# Patient Record
Sex: MALE | Race: ASIAN | NOT HISPANIC OR LATINO
[De-identification: names, ages, dates, MRNs, and addresses within clinical notes are randomized per-mention and may not be internally consistent; named-entity substitution may affect disease eponyms.]

---

## 2021-04-13 VITALS
RESPIRATION RATE: 17 BRPM | DIASTOLIC BLOOD PRESSURE: 81 MMHG | WEIGHT: 214.07 LBS | SYSTOLIC BLOOD PRESSURE: 176 MMHG | OXYGEN SATURATION: 92 % | HEIGHT: 74 IN | TEMPERATURE: 98 F | HEART RATE: 76 BPM

## 2021-04-13 RX ORDER — CHLORHEXIDINE GLUCONATE 213 G/1000ML
1 SOLUTION TOPICAL ONCE
Refills: 0 | Status: DISCONTINUED | OUTPATIENT
Start: 2021-04-22 | End: 2021-04-22

## 2021-04-13 NOTE — H&P ADULT - BACK
No deformity or limitation of movement
Implemented All Universal Safety Interventions:  [place] to call system. Call bell, personal items and telephone within reach. Instruct patient to call for assistance. Room bathroom lighting operational. Non-slip footwear when patient is off stretcher. Physically safe environment: no spills, clutter or unnecessary equipment. Stretcher in lowest position, wheels locked, appropriate side rails in place.

## 2021-04-13 NOTE — H&P ADULT - ASSESSMENT
61 year old M, former smoker with FHx of CAD (Siblings and parents), PMHx HLD, DM II, CKD Stage II (GFR 53, Cr 1.43 on 4/2/2021), Afib (on Eliquis last dose 4/21/21 AM), CAD s/p 1V CABG and Mitral Valve Repair @ [facility] 2003, Graves s/p Radioactive Iodine now with Hypothyroidism, ADALI stenosis s/p CEA, Erectile Dysfunction, PAD s/p PTA 3/2021 @ [facility] complicated by LLE cellulitis requiring admission @ [facility] for 4 days for IV Abx, MAEGAN on CPAP, ? COPD (no intubations, no home O2), Anxiety who in light of risk factors, CCS class III, and abnormal stress echo patient now presents for cardiac catheterization with possible intervention.   ASA II, Mallampati II  Hgb/HCT: 12.2/40.1. Pt denies bleeding, melena, BRBPR, hematuria. Last dose of Eliquis 5mg 4/21/21 AM, reports compliance with Plavix 75mg PO QD, last dose 4/21/21. Pt ordered for Aspirin 325mg PO x 1 and Plavix 75mg PO x 1.   NS @ 75 cc/hr ordered. Pt is euvolemic on exam. EF 50% by ECHO. Cr. 1.27 ( 1/43 on 4/2/21). Pt with tight legs B/L, non-pitting edema.   Pt is a candidate for moderate sedation: yes  Risks & benefits of procedure and alternative therapy have been explained to the patient including but not limited to: allergic reaction, bleeding w/possible need for blood transfusion, infection, renal and vascular compromise, limb damage, arrhythmia, stroke, vessel dissection/perforation, Myocardial infarction, emergent CABG. Informed consent obtained and in chart.   61 year old M, former smoker with FHx of CAD (Siblings and parents), PMHx HLD, DM II, CKD Stage II (GFR 53, Cr 1.43 on 4/2/2021), Afib (on Eliquis last dose 4/21/21 AM), CAD s/p 1V CABG and Mitral Valve Repair @ [facility] 2003, Graves s/p Radioactive Iodine now with Hypothyroidism, ADALI stenosis s/p CEA, Erectile Dysfunction, PAD s/p PTA 3/2021 @ [facility] complicated by LLE cellulitis requiring admission @ [facility] for 4 days for IV Abx, MAEGAN on CPAP, ? COPD (no intubations, no home O2), Anxiety who in light of risk factors, CCS class III, and abnormal stress echo patient now presents for cardiac catheterization with possible intervention.   ASA II, Mallampati II  Hgb/HCT: 12.2/40.1. Pt denies bleeding, melena, BRBPR, hematuria. Last dose of Eliquis 5mg 4/21/21 AM, reports compliance with Plavix 75mg PO QD, last dose 4/21/21. Pt ordered for Aspirin 325mg PO x 1 and Plavix 75mg PO x 1.   NS @ 100 cc/hr ordered. Pt is euvolemic on exam. EF 50% by ECHO. Cr. 1.27 ( 1/43 on 4/2/21). Pt with tight legs B/L, non-pitting edema.   Pt is a candidate for moderate sedation: yes  Risks & benefits of procedure and alternative therapy have been explained to the patient including but not limited to: allergic reaction, bleeding w/possible need for blood transfusion, infection, renal and vascular compromise, limb damage, arrhythmia, stroke, vessel dissection/perforation, Myocardial infarction, emergent CABG. Informed consent obtained and in chart.   61 year old M, former smoker with FHx of CAD (Siblings and parents), PMHx HLD, DM II, CKD Stage II (GFR 53, Cr 1.43 on 4/2/2021), Afib (on Eliquis last dose 4/21/21 AM), CAD s/p 1V CABG and Mitral Valve Repair @ [facility] 2003, Graves s/p Radioactive Iodine now with Hypothyroidism, ADALI stenosis s/p CEA, Erectile Dysfunction, PAD s/p PTA 3/2021 @ [facility] complicated by LLE cellulitis requiring admission @ [facility] for 4 days for IV Abx, MAEGAN on CPAP, ? COPD (no intubations, no home O2), Anxiety who in light of risk factors, CCS class III, and abnormal stress echo patient now presents for cardiac catheterization with possible intervention.   ASA II, Mallampati II  Hgb/HCT: 12.2/40.1. Pt denies bleeding, melena, BRBPR, hematuria. Last dose of Eliquis 5mg 4/21/21 AM, reports compliance with Plavix 75mg PO QD, last dose 4/21/21, IC Fellow Montrell made aware. Pt ordered for Aspirin 325mg PO x 1 and Plavix 75mg PO x 1.   NS @ 100 cc/hr ordered. Pt is euvolemic on exam. EF 50% by ECHO. Cr. 1.27 ( 1/43 on 4/2/21). Pt with tight legs B/L, non-pitting edema.   Pt is a candidate for moderate sedation: yes  Risks & benefits of procedure and alternative therapy have been explained to the patient including but not limited to: allergic reaction, bleeding w/possible need for blood transfusion, infection, renal and vascular compromise, limb damage, arrhythmia, stroke, vessel dissection/perforation, Myocardial infarction, emergent CABG. Informed consent obtained and in chart.

## 2021-04-13 NOTE — H&P ADULT - NSICDXPASTSURGICALHX_GEN_ALL_CORE_FT
PAST SURGICAL HISTORY:  H/O salivary gland disease S/p excision    S/P CABG x 1     S/P carotid endarterectomy

## 2021-04-13 NOTE — H&P ADULT - NSICDXPASTMEDICALHX_GEN_ALL_CORE_FT
PAST MEDICAL HISTORY:  Atrial fibrillation     CAD (coronary artery disease)     H/O erectile dysfunction     H/O mitral valve stenosis     Hyperlipidemia     Hypertension     Hypothyroidism     MAEGAN on CPAP     PAD (peripheral artery disease)     Stage 2 chronic kidney disease

## 2021-04-13 NOTE — H&P ADULT - HISTORY OF PRESENT ILLNESS
[facility]  Cardiologist: Dr. Brandi Negrete  Pharmacy:  Escort:  61 year old M former smoker with PMHx Hyperlipidemia, DM Type II, CKD Stage III (GFR 53, Cr 1.43 on 4/2/2021), Afib (on Eliquis last dose ?) , CAD s/p 1V CABG and Mitral Valve Repair,  Graves s/p Radioactive Iodine now with Hypothyroidism, ADALI stenosis s/p CEA, Erectile Dysfunction, PAD s/p PTA, MAEGAN, COPD   [facility]  Cardiologist: Dr. Brandi Negrete  Pharmacy:  Escort:  61 year old M former smoker with PMHx Hyperlipidemia, DM Type II, CKD Stage III (GFR 53, Cr 1.43 on 4/2/2021), Afib (on Eliquis last dose ?) , CAD s/p 1V CABG and Mitral Valve Repair,  Graves s/p Radioactive Iodine now with Hypothyroidism, ADALI stenosis s/p CEA, Erectile Dysfunction, PAD s/p PTA, MAEGAN, COPD who presented to cardiologist Dr. Negrete   Shortness of breath with 1/2 block and   feels dizzy when walking  event monitor 12/9/21 3 runs of SVT over 7 days  Patient denies active chest pain, palpitations, shortness of breath, diaphoresis, fatigue, dizziness, syncope, lower extremity edema, orthopnea, positional nocturnal dyspnea, recent fever, chills, night sweats, cough, nausea, vomiting, and diarrhea.   In light of patients risk factors, CCS class **, and abnormal patient now presents for cardiac catheterization with possible intervention.    Covid:  Cardiologist: Dr. Brandi Negrete  Pharmacy:  Escort:  [facility]  Confirm meds  61 year old M former smoker with PMHx HLD, DM II, CKD Stage III (GFR 53, Cr 1.43 on 4/2/2021), Afib (on Eliquis last dose____) , CAD s/p 1V CABG and Mitral Valve Repair,  Graves s/p Radioactive Iodine now with Hypothyroidism, ADALI stenosis s/p CEA, Erectile Dysfunction, PAD s/p PTA, MAEGAN, COPD who presented to cardiologist Dr. Negrete c/o SOB and dizziness upon <1 block ambulation. Pt currently denies fevers, chills, chest pain, palpitations, orthopnea, PND, syncope, n/v, LE edema. Holter monitor 12/9/21 3 runs of SVT over 7 days.  In light of patients risk factors, CCS class **, and abnormal ____ patient now presents for cardiac catheterization with possible intervention.    Covid: NEG in HIE 4/19   Cardiologist: Dr. Brandi Negrete  Pharmacy:   Escort:  [email or web address]  Confirm meds - Last dose Eliquis?  61 year old M former smoker with PMHx HLD, DM II, CKD Stage III (GFR 53, Cr 1.43 on 4/2/2021), Afib (on Eliquis last dose____) , CAD s/p 1V CABG and Mitral Valve Repair,  Graves s/p Radioactive Iodine now with Hypothyroidism, ADALI stenosis s/p CEA, Erectile Dysfunction, PAD s/p PTA, MAEGAN, COPD who presented to cardiologist Dr. Negrete c/o SOB and dizziness upon <1 block ambulation. Pt currently denies fevers, chills, chest pain, palpitations, orthopnea, PND, syncope, n/v, LE edema. Stress echo 7/2020 positive for ischemia of the mid septum and mid/basal inferior wall. Holter monitor 12/9/21 3 runs of SVT over 7 days.  In light of patients risk factors, CCS class **, and abnormal stress echo patient now presents for cardiac catheterization with possible intervention.    Covid: NEG in HIE 4/19   Cardiologist: Dr. Brandi Negrete  Pharmacy: [facility] , [facility]  Escort: None  61 year old M, former smoker with FHx of CAD (Siblings and parents), PMHx HLD, DM II, CKD Stage II (GFR 53, Cr 1.43 on 4/2/2021), Afib (on Eliquis last dose 4/21/21 AM), CAD s/p 1V CABG and Mitral Valve Repair @ [facility] 2003, Graves s/p Radioactive Iodine now with Hypothyroidism, ADALI stenosis s/p CEA, Erectile Dysfunction, PAD s/p PTA 3/2021 @ [facility] complicated by LLE cellulitis requiring admission @ [facility] for 4 days for IV Abx, MAEGAN on CPAP, ? COPD (no intubations, no home O2), Anxiety who presented to cardiologist Dr. Negrete c/o SOB and dizziness upon <1 block ambulation for the past 4 months. Pt currently denies fevers, chills, chest pain, palpitations, orthopnea, PND, syncope, n/v. Stress echo 7/2020 positive for ischemia of the mid septum and mid/basal inferior wall, trivial WI, trace TR, mild MR. Holter monitor 12/9/21 3 runs of SVT over 7 days.  In light of patients risk factors, CCS class III, and abnormal stress echo patient now presents for cardiac catheterization with possible intervention.

## 2021-04-14 PROBLEM — Z00.00 ENCOUNTER FOR PREVENTIVE HEALTH EXAMINATION: Status: ACTIVE | Noted: 2021-04-14

## 2021-04-19 ENCOUNTER — LABORATORY RESULT (OUTPATIENT)
Age: 61
End: 2021-04-19

## 2021-04-20 RX ORDER — CHLORHEXIDINE GLUCONATE 213 G/1000ML
1 SOLUTION TOPICAL ONCE
Refills: 0 | Status: DISCONTINUED | OUTPATIENT
Start: 2021-04-22 | End: 2021-04-22

## 2021-04-22 ENCOUNTER — TRANSCRIPTION ENCOUNTER (OUTPATIENT)
Age: 61
End: 2021-04-22

## 2021-04-22 ENCOUNTER — INPATIENT (INPATIENT)
Facility: HOSPITAL | Age: 61
LOS: 0 days | Discharge: HOME | DRG: 247 | End: 2021-04-23
Attending: INTERNAL MEDICINE | Admitting: INTERNAL MEDICINE
Payer: COMMERCIAL

## 2021-04-22 DIAGNOSIS — Z95.1 PRESENCE OF AORTOCORONARY BYPASS GRAFT: Chronic | ICD-10-CM

## 2021-04-22 DIAGNOSIS — Z98.890 OTHER SPECIFIED POSTPROCEDURAL STATES: Chronic | ICD-10-CM

## 2021-04-22 DIAGNOSIS — Z87.19 PERSONAL HISTORY OF OTHER DISEASES OF THE DIGESTIVE SYSTEM: Chronic | ICD-10-CM

## 2021-04-22 LAB
A1C WITH ESTIMATED AVERAGE GLUCOSE RESULT: 8.2 % — HIGH (ref 4–5.6)
ALBUMIN SERPL ELPH-MCNC: 4.5 G/DL — SIGNIFICANT CHANGE UP (ref 3.3–5)
ALP SERPL-CCNC: 104 U/L — SIGNIFICANT CHANGE UP (ref 40–120)
ALT FLD-CCNC: 13 U/L — SIGNIFICANT CHANGE UP (ref 10–45)
ANION GAP SERPL CALC-SCNC: 9 MMOL/L — SIGNIFICANT CHANGE UP (ref 5–17)
APTT BLD: 36.3 SEC — HIGH (ref 27.5–35.5)
AST SERPL-CCNC: 17 U/L — SIGNIFICANT CHANGE UP (ref 10–40)
BASOPHILS # BLD AUTO: 0.03 K/UL — SIGNIFICANT CHANGE UP (ref 0–0.2)
BASOPHILS NFR BLD AUTO: 0.5 % — SIGNIFICANT CHANGE UP (ref 0–2)
BILIRUB DIRECT SERPL-MCNC: 0.2 MG/DL — SIGNIFICANT CHANGE UP (ref 0–0.2)
BILIRUB INDIRECT FLD-MCNC: 0.4 MG/DL — SIGNIFICANT CHANGE UP (ref 0.2–1)
BILIRUB SERPL-MCNC: 0.6 MG/DL — SIGNIFICANT CHANGE UP (ref 0.2–1.2)
BUN SERPL-MCNC: 27 MG/DL — HIGH (ref 7–23)
CALCIUM SERPL-MCNC: 9.5 MG/DL — SIGNIFICANT CHANGE UP (ref 8.4–10.5)
CHLORIDE SERPL-SCNC: 101 MMOL/L — SIGNIFICANT CHANGE UP (ref 96–108)
CHOLEST SERPL-MCNC: 127 MG/DL — SIGNIFICANT CHANGE UP
CK MB CFR SERPL CALC: 2.8 NG/ML — SIGNIFICANT CHANGE UP (ref 0–6.7)
CK SERPL-CCNC: 73 U/L — SIGNIFICANT CHANGE UP (ref 30–200)
CO2 SERPL-SCNC: 31 MMOL/L — SIGNIFICANT CHANGE UP (ref 22–31)
CREAT SERPL-MCNC: 1.27 MG/DL — SIGNIFICANT CHANGE UP (ref 0.5–1.3)
EOSINOPHIL # BLD AUTO: 0.09 K/UL — SIGNIFICANT CHANGE UP (ref 0–0.5)
EOSINOPHIL NFR BLD AUTO: 1.4 % — SIGNIFICANT CHANGE UP (ref 0–6)
ESTIMATED AVERAGE GLUCOSE: 189 MG/DL — HIGH (ref 68–114)
GLUCOSE BLDC GLUCOMTR-MCNC: 112 MG/DL — HIGH (ref 70–99)
GLUCOSE SERPL-MCNC: 157 MG/DL — HIGH (ref 70–99)
HCT VFR BLD CALC: 40.1 % — SIGNIFICANT CHANGE UP (ref 39–50)
HDLC SERPL-MCNC: 42 MG/DL — SIGNIFICANT CHANGE UP
HGB BLD-MCNC: 12.2 G/DL — LOW (ref 13–17)
IMM GRANULOCYTES NFR BLD AUTO: 1.1 % — SIGNIFICANT CHANGE UP (ref 0–1.5)
INR BLD: 1.43 — HIGH (ref 0.88–1.16)
LIPID PNL WITH DIRECT LDL SERPL: 75 MG/DL — SIGNIFICANT CHANGE UP
LYMPHOCYTES # BLD AUTO: 0.59 K/UL — LOW (ref 1–3.3)
LYMPHOCYTES # BLD AUTO: 9 % — LOW (ref 13–44)
MCHC RBC-ENTMCNC: 27.2 PG — SIGNIFICANT CHANGE UP (ref 27–34)
MCHC RBC-ENTMCNC: 30.4 GM/DL — LOW (ref 32–36)
MCV RBC AUTO: 89.5 FL — SIGNIFICANT CHANGE UP (ref 80–100)
MONOCYTES # BLD AUTO: 0.77 K/UL — SIGNIFICANT CHANGE UP (ref 0–0.9)
MONOCYTES NFR BLD AUTO: 11.7 % — SIGNIFICANT CHANGE UP (ref 2–14)
NEUTROPHILS # BLD AUTO: 5.02 K/UL — SIGNIFICANT CHANGE UP (ref 1.8–7.4)
NEUTROPHILS NFR BLD AUTO: 76.3 % — SIGNIFICANT CHANGE UP (ref 43–77)
NON HDL CHOLESTEROL: 85 MG/DL — SIGNIFICANT CHANGE UP
NRBC # BLD: 0 /100 WBCS — SIGNIFICANT CHANGE UP (ref 0–0)
PLATELET # BLD AUTO: 126 K/UL — LOW (ref 150–400)
POTASSIUM SERPL-MCNC: 4.5 MMOL/L — SIGNIFICANT CHANGE UP (ref 3.5–5.3)
POTASSIUM SERPL-SCNC: 4.5 MMOL/L — SIGNIFICANT CHANGE UP (ref 3.5–5.3)
PROT SERPL-MCNC: 8.6 G/DL — HIGH (ref 6–8.3)
PROTHROM AB SERPL-ACNC: 16.9 SEC — HIGH (ref 10.6–13.6)
RBC # BLD: 4.48 M/UL — SIGNIFICANT CHANGE UP (ref 4.2–5.8)
RBC # FLD: 16 % — HIGH (ref 10.3–14.5)
SODIUM SERPL-SCNC: 141 MMOL/L — SIGNIFICANT CHANGE UP (ref 135–145)
TRIGL SERPL-MCNC: 48 MG/DL — SIGNIFICANT CHANGE UP
WBC # BLD: 6.57 K/UL — SIGNIFICANT CHANGE UP (ref 3.8–10.5)
WBC # FLD AUTO: 6.57 K/UL — SIGNIFICANT CHANGE UP (ref 3.8–10.5)

## 2021-04-22 PROCEDURE — 92928 PRQ TCAT PLMT NTRAC ST 1 LES: CPT | Mod: RI

## 2021-04-22 PROCEDURE — 93010 ELECTROCARDIOGRAM REPORT: CPT | Mod: 76

## 2021-04-22 PROCEDURE — 99152 MOD SED SAME PHYS/QHP 5/>YRS: CPT

## 2021-04-22 PROCEDURE — 93455 CORONARY ART/GRFT ANGIO S&I: CPT | Mod: 26,59

## 2021-04-22 RX ORDER — ATORVASTATIN CALCIUM 80 MG/1
1 TABLET, FILM COATED ORAL
Qty: 0 | Refills: 0 | DISCHARGE

## 2021-04-22 RX ORDER — LEVOTHYROXINE SODIUM 125 MCG
150 TABLET ORAL DAILY
Refills: 0 | Status: DISCONTINUED | OUTPATIENT
Start: 2021-04-22 | End: 2021-04-23

## 2021-04-22 RX ORDER — DEXTROSE 50 % IN WATER 50 %
12.5 SYRINGE (ML) INTRAVENOUS ONCE
Refills: 0 | Status: DISCONTINUED | OUTPATIENT
Start: 2021-04-22 | End: 2021-04-23

## 2021-04-22 RX ORDER — FUROSEMIDE 40 MG
1 TABLET ORAL
Qty: 0 | Refills: 0 | DISCHARGE

## 2021-04-22 RX ORDER — GLUCAGON INJECTION, SOLUTION 0.5 MG/.1ML
1 INJECTION, SOLUTION SUBCUTANEOUS ONCE
Refills: 0 | Status: DISCONTINUED | OUTPATIENT
Start: 2021-04-22 | End: 2021-04-23

## 2021-04-22 RX ORDER — SERTRALINE 25 MG/1
50 TABLET, FILM COATED ORAL DAILY
Refills: 0 | Status: DISCONTINUED | OUTPATIENT
Start: 2021-04-22 | End: 2021-04-23

## 2021-04-22 RX ORDER — INSULIN LISPRO 100/ML
VIAL (ML) SUBCUTANEOUS
Refills: 0 | Status: DISCONTINUED | OUTPATIENT
Start: 2021-04-22 | End: 2021-04-23

## 2021-04-22 RX ORDER — SPIRONOLACTONE 25 MG/1
12.5 TABLET, FILM COATED ORAL DAILY
Refills: 0 | Status: DISCONTINUED | OUTPATIENT
Start: 2021-04-23 | End: 2021-04-23

## 2021-04-22 RX ORDER — INSULIN GLARGINE 100 [IU]/ML
24 INJECTION, SOLUTION SUBCUTANEOUS AT BEDTIME
Refills: 0 | Status: DISCONTINUED | OUTPATIENT
Start: 2021-04-22 | End: 2021-04-23

## 2021-04-22 RX ORDER — SODIUM CHLORIDE 9 MG/ML
1000 INJECTION, SOLUTION INTRAVENOUS
Refills: 0 | Status: DISCONTINUED | OUTPATIENT
Start: 2021-04-22 | End: 2021-04-23

## 2021-04-22 RX ORDER — APIXABAN 2.5 MG/1
5 TABLET, FILM COATED ORAL
Refills: 0 | Status: DISCONTINUED | OUTPATIENT
Start: 2021-04-23 | End: 2021-04-23

## 2021-04-22 RX ORDER — ASPIRIN/CALCIUM CARB/MAGNESIUM 324 MG
81 TABLET ORAL DAILY
Refills: 0 | Status: DISCONTINUED | OUTPATIENT
Start: 2021-04-23 | End: 2021-04-23

## 2021-04-22 RX ORDER — ENOXAPARIN SODIUM 100 MG/ML
70 INJECTION SUBCUTANEOUS
Qty: 0 | Refills: 0 | DISCHARGE

## 2021-04-22 RX ORDER — DEXTROSE 50 % IN WATER 50 %
25 SYRINGE (ML) INTRAVENOUS ONCE
Refills: 0 | Status: DISCONTINUED | OUTPATIENT
Start: 2021-04-22 | End: 2021-04-23

## 2021-04-22 RX ORDER — CLOPIDOGREL BISULFATE 75 MG/1
75 TABLET, FILM COATED ORAL ONCE
Refills: 0 | Status: COMPLETED | OUTPATIENT
Start: 2021-04-22 | End: 2021-04-22

## 2021-04-22 RX ORDER — INSULIN LISPRO 100/ML
8 VIAL (ML) SUBCUTANEOUS
Refills: 0 | Status: DISCONTINUED | OUTPATIENT
Start: 2021-04-22 | End: 2021-04-23

## 2021-04-22 RX ORDER — CLOPIDOGREL BISULFATE 75 MG/1
75 TABLET, FILM COATED ORAL DAILY
Refills: 0 | Status: DISCONTINUED | OUTPATIENT
Start: 2021-04-23 | End: 2021-04-23

## 2021-04-22 RX ORDER — DEXTROSE 50 % IN WATER 50 %
15 SYRINGE (ML) INTRAVENOUS ONCE
Refills: 0 | Status: DISCONTINUED | OUTPATIENT
Start: 2021-04-22 | End: 2021-04-23

## 2021-04-22 RX ORDER — VALSARTAN 80 MG/1
160 TABLET ORAL DAILY
Refills: 0 | Status: DISCONTINUED | OUTPATIENT
Start: 2021-04-23 | End: 2021-04-23

## 2021-04-22 RX ORDER — FUROSEMIDE 40 MG
20 TABLET ORAL ONCE
Refills: 0 | Status: COMPLETED | OUTPATIENT
Start: 2021-04-22 | End: 2021-04-22

## 2021-04-22 RX ORDER — SODIUM CHLORIDE 9 MG/ML
500 INJECTION INTRAMUSCULAR; INTRAVENOUS; SUBCUTANEOUS
Refills: 0 | Status: DISCONTINUED | OUTPATIENT
Start: 2021-04-22 | End: 2021-04-22

## 2021-04-22 RX ORDER — ENOXAPARIN SODIUM 100 MG/ML
48 INJECTION SUBCUTANEOUS
Qty: 0 | Refills: 0 | DISCHARGE

## 2021-04-22 RX ORDER — FUROSEMIDE 40 MG
40 TABLET ORAL DAILY
Refills: 0 | Status: DISCONTINUED | OUTPATIENT
Start: 2021-04-23 | End: 2021-04-23

## 2021-04-22 RX ORDER — ASPIRIN/CALCIUM CARB/MAGNESIUM 324 MG
325 TABLET ORAL ONCE
Refills: 0 | Status: COMPLETED | OUTPATIENT
Start: 2021-04-22 | End: 2021-04-22

## 2021-04-22 RX ORDER — ATORVASTATIN CALCIUM 80 MG/1
80 TABLET, FILM COATED ORAL AT BEDTIME
Refills: 0 | Status: DISCONTINUED | OUTPATIENT
Start: 2021-04-22 | End: 2021-04-23

## 2021-04-22 RX ORDER — AMLODIPINE BESYLATE 2.5 MG/1
5 TABLET ORAL DAILY
Refills: 0 | Status: DISCONTINUED | OUTPATIENT
Start: 2021-04-22 | End: 2021-04-23

## 2021-04-22 RX ADMIN — ATORVASTATIN CALCIUM 80 MILLIGRAM(S): 80 TABLET, FILM COATED ORAL at 21:39

## 2021-04-22 RX ADMIN — INSULIN GLARGINE 24 UNIT(S): 100 INJECTION, SOLUTION SUBCUTANEOUS at 23:09

## 2021-04-22 RX ADMIN — SODIUM CHLORIDE 75 MILLILITER(S): 9 INJECTION INTRAMUSCULAR; INTRAVENOUS; SUBCUTANEOUS at 11:43

## 2021-04-22 RX ADMIN — CLOPIDOGREL BISULFATE 75 MILLIGRAM(S): 75 TABLET, FILM COATED ORAL at 11:42

## 2021-04-22 RX ADMIN — Medication 325 MILLIGRAM(S): at 11:42

## 2021-04-22 RX ADMIN — Medication 20 MILLIGRAM(S): at 17:00

## 2021-04-22 NOTE — DISCHARGE NOTE PROVIDER - NSDCMRMEDTOKEN_GEN_ALL_CORE_FT
amlodipine-valsartan 5 mg-160 mg oral tablet: 1 tab(s) orally once a day  Eliquis 5 mg oral tablet: 1 tab(s) orally 2 times a day  eplerenone 25 mg oral tablet: 1 tab(s) orally once a day  furosemide 40 mg oral tablet: 1 tab(s) orally once a day  Januvia 100 mg oral tablet: 1 tab(s) orally once a day  Levemir 100 units/mL subcutaneous solution: 36 unit(s) subcutaneous once a day AM  Levemir 100 units/mL subcutaneous solution: 22 unit(s) subcutaneous once a day (at bedtime)  levothyroxine 150 mcg (0.15 mg) oral tablet: 1 tab(s) orally once a day  metFORMIN 500 mg oral tablet, extended release: 1 tab(s) orally once a day  NovoLOG 100 units/mL subcutaneous solution: 5 unit(s) subcutaneous 3 times a day  Plavix 75 mg oral tablet: 1 tab(s) orally once a day  rosuvastatin 40 mg oral tablet: 1 tab(s) orally once a day  tadalafil 5 mg oral tablet: 1 tab(s) orally once a day  Zoloft 50 mg oral tablet: 1 tab(s) orally once a day

## 2021-04-22 NOTE — DISCHARGE NOTE PROVIDER - CARE PROVIDER_API CALL
Brandi Negrete)  Adult Congenital Heart Disease; Cardiovascular Disease; Internal Medicine  [address], [address]  [place] [address]  Phone: [phone]  Fax: [phone]  Established Patient  Follow Up Time:

## 2021-04-22 NOTE — DISCHARGE NOTE PROVIDER - HOSPITAL COURSE
61 year old M, former smoker with FHx of CAD (Siblings and parents), PMHx HLD, DM II, CKD Stage II (GFR 53, Cr 1.43 on 4/2/2021), Afib (on Eliquis last dose 4/21/21 AM), CAD s/p 1V CABG and Mitral Valve Repair @ [facility] 2003, Graves s/p Radioactive Iodine now with Hypothyroidism, ADALI stenosis s/p CEA, Erectile Dysfunction, PAD s/p PTA 3/2021 @ [facility] complicated by LLE cellulitis requiring admission @ [facility] for 4 days for IV Abx, MAEGAN on CPAP, ? COPD (no intubations, no home O2), Anxiety who presented to cardiologist Dr. Negrete c/o SOB and dizziness upon <1 block ambulation for the past 4 months. Pt currently denies fevers, chills, chest pain, palpitations, orthopnea, PND, syncope, n/v. Stress echo 7/2020 positive for ischemia of the mid septum and mid/basal inferior wall, trivial GA, trace TR, mild MR. Holter monitor 12/9/21 3 runs of SVT over 7 days. In light of patients risk factors, CCS class III, and abnormal stress echo patient now presents for cardiac catheterization with possible intervention.   pt s/p cardiac cath 4/22/21: s/p PTCA/DARYA to Ramus (80%), D1 90% (fills via LIMA), pLAD 30-50%, RCA mild diffuse dz.  EDP 25, EF 55%, no AS, no MR. Per Dr Ortiz, ordered Lasix 20mg IV x 1 and R Groin PC. Pt admitted to cardiac tele overnight for observation.  VSS, labs checked , no events on tele. Pt will be on triple therapy aspirin 81 mg QD, plavix 75 mg QD, Eliquis 5 mg BID x 2 weeks and then d/c Asprin. Pt will continue lasix 40 mg QD, crestor 40 mg QHS, epelnarone 25 mg QD, amlodipine-valsartan 5-160 mg QD.     Pt given appropriate d/c instructions and verbalized understanding. Meds sent to preferred pharmacy.  Pt deemed stable for d/c per Dr Serrano and will f/u with Dr Negrete in 1-2 weeks    Cardiac Rehab (Post PCI):            *Education on benefits of Cardiac Rehab provided to patient: Yes         *Referral and Prescription Given for Cardiac Rehab : Yes         *Pt given list of locations & instructed to contact their insurance company to review list of participating providers   61 year old M, former smoker with FHx of CAD (Siblings and parents), PMHx HLD, DM II, CKD Stage II (GFR 53, Cr 1.43 on 4/2/2021), Afib (on Eliquis last dose 4/21/21 AM), CAD s/p 1V CABG and Mitral Valve Repair @ [facility] 2003, Graves s/p Radioactive Iodine now with Hypothyroidism, ADALI stenosis s/p CEA, Erectile Dysfunction, PAD s/p PTA 3/2021 @ [facility] complicated by LLE cellulitis requiring admission @ [facility] for 4 days for IV Abx, MAEGAN on CPAP, ? COPD (no intubations, no home O2), Anxiety who presented to cardiologist Dr. Negrete c/o SOB and dizziness upon <1 block ambulation for the past 4 months. Pt currently denies fevers, chills, chest pain, palpitations, orthopnea, PND, syncope, n/v. Stress echo 7/2020 positive for ischemia of the mid septum and mid/basal inferior wall, trivial VT, trace TR, mild MR. Holter monitor 12/9/21 3 runs of SVT over 7 days. In light of patients risk factors, CCS class III, and abnormal stress echo patient now presents for cardiac catheterization with possible intervention.   pt s/p cardiac cath 4/22/21: s/p PTCA/DARYA to Ramus (80%), D1 90% (fills via LIMA), pLAD 30-50%, RCA mild diffuse dz.  EDP 25, EF 55%, no AS, no MR. Per Dr Ortiz, ordered Lasix 20mg IV x 1 and R Groin PC. Pt admitted to cardiac tele overnight for observation.  Patient seen and examined at bedside and has no complaints. VSS, labs reviewed, no events on tele. Pt will be on triple therapy aspirin 81 mg Daily, plavix 75 mg Daily,  Eliquis 5 mg BID x 2 weeks and then d/c Asprin. Pt will continue lasix 40 mg Daily, crestor 40 mg QHS, eplerenone 25 mg Daily,, amlodipine-valsartan 5-160 mg Daily,    Pt given appropriate d/c instructions and verbalized understanding. Meds sent to preferred pharmacy.  Pt deemed stable for d/c per Dr Serrano and will f/u with Dr Negrete in 1-2 weeks    Cardiac Rehab (Post PCI):            *Education on benefits of Cardiac Rehab provided to patient: Yes         *Referral and Prescription Given for Cardiac Rehab : Yes         *Pt given list of locations & instructed to contact their insurance company to review list of participating providers

## 2021-04-22 NOTE — DISCHARGE NOTE PROVIDER - NSDCCPCAREPLAN_GEN_ALL_CORE_FT
PRINCIPAL DISCHARGE DIAGNOSIS  Diagnosis: CAD (coronary atherosclerotic disease)  Assessment and Plan of Treatment: You have a diagnosis of coronary artery disease and underwent a cardiac catheterization. You received a stent to your ramus coronary artery.  You have been started on Aspirin 81mg daily FOR 2 WEEKS and Plavix (Clopidogrel) 75mg daily. The procedure was done through the groin. Please avoid any heavy lifting  (no more than 3 to 5 lbs) or strenuous activity for five days. If you develop any swelling, bleeding, hardening of the skin (hematoma formation), acute pain, numbness/tingling  in your arm or leg please contact your doctor immediately or call our 24/7 line: [phone].   NEVER MISS A DOSE OF ASPIRIN OR PLAVIX; IF YOU DO, YOU ARE AT RISK OF YOUR STENT CLOSING AND HAVING A HEART ATTACK. DO NOT STOP THESE TWO MEDICATIONS UNLESS INSTRUCTED TO DO SO BY YOUR CARDIOLOGIST.    Please make a follow up appointment with your cardiologist Dr Negrete within 1-2 weeks of your discharge. All of your prescriptions have been sent electronically to your pharmacy.  We have provided you with a prescription for cardiac rehab which is medically supervised exercise program for your heart and has been shown to improve the quantity and quality of life of people with heart disease like yours. You should attend cardiac rehab 3 times per week for 12 weeks. We have provided you with a list of nearby facilities. Please call your insurance carrier to determine which of these facilities are covered under your plan. Please bring this prescription with you to your follow up appointment with your cardiologist who can then further assist you to enroll into a cardiac rehab program.        SECONDARY DISCHARGE DIAGNOSES  Diagnosis: Type 2 diabetes mellitus  Assessment and Plan of Treatment: If you are a diabetic and you take Metformin: DO NOT TAKE your Metformin for two days. This medication can interact with the contrast used during your procedure therefore we want to ensure the contrast has left your body prior to you restarting your Metformin.   Make sure you monitor your finger sticks and diet while you are not taking your Metformin!  PLEASE HOLD AND RESTART METFORMIN ON SUNDAY 4/25/21.      Diagnosis: HTN (hypertension)  Assessment and Plan of Treatment: *htn    Diagnosis: HLD (hyperlipidemia)  Assessment and Plan of Treatment: please continue crestor 40 mg daily     PRINCIPAL DISCHARGE DIAGNOSIS  Diagnosis: CAD (coronary atherosclerotic disease)  Assessment and Plan of Treatment: You have a diagnosis of coronary artery disease and underwent a cardiac catheterization. You received a stent to your ramus coronary artery.  You have been started on Aspirin 81mg daily FOR 2 WEEKS and Plavix (Clopidogrel) 75mg daily. The procedure was done through the groin. Please avoid any heavy lifting  (no more than 3 to 5 lbs) or strenuous activity for five days. If you develop any swelling, bleeding, hardening of the skin (hematoma formation), acute pain, numbness/tingling  in your leg please contact your doctor immediately or call our 24/7 line: [phone].   NEVER MISS A DOSE OF ASPIRIN OR PLAVIX; IF YOU DO, YOU ARE AT RISK OF YOUR STENT CLOSING AND HAVING A HEART ATTACK. DO NOT STOP THESE TWO MEDICATIONS UNLESS INSTRUCTED TO DO SO BY YOUR CARDIOLOGIST.    Please make a follow up appointment with your cardiologist Dr Negrete within 1-2 weeks of your discharge. All of your prescriptions have been sent electronically to your pharmacy.  We have provided you with a prescription for cardiac rehab which is medically supervised exercise program for your heart and has been shown to improve the quantity and quality of life of people with heart disease like yours. You should attend cardiac rehab 3 times per week for 12 weeks. We have provided you with a list of nearby facilities. Please call your insurance carrier to determine which of these facilities are covered under your plan. Please bring this prescription with you to your follow up appointment with your cardiologist who can then further assist you to enroll into a cardiac rehab program.        SECONDARY DISCHARGE DIAGNOSES  Diagnosis: Type 2 diabetes mellitus  Assessment and Plan of Treatment: If you are a diabetic and you take Metformin: DO NOT TAKE your Metformin for two days. This medication can interact with the contrast used during your procedure therefore we want to ensure the contrast has left your body prior to you restarting your Metformin.   Make sure you monitor your finger sticks and diet while you are not taking your Metformin!  PLEASE HOLD AND RESTART METFORMIN ON SUNDAY 4/25/21.      Diagnosis: HTN (hypertension)  Assessment and Plan of Treatment: *htn    Diagnosis: HLD (hyperlipidemia)  Assessment and Plan of Treatment: please continue crestor 40 mg daily     PRINCIPAL DISCHARGE DIAGNOSIS  Diagnosis: CAD (coronary atherosclerotic disease)  Assessment and Plan of Treatment: You have a diagnosis of coronary artery disease and underwent a cardiac catheterization. You received a stent to your ramus coronary artery.  You have been started on Aspirin 81mg daily FOR 2 WEEKS and Plavix (Clopidogrel) 75mg daily. The procedure was done through the groin. Please avoid any heavy lifting  (no more than 3 to 5 lbs) or strenuous activity for five days. If you develop any swelling, bleeding, hardening of the skin (hematoma formation), acute pain, numbness/tingling  in your leg please contact your doctor immediately or call our 24/7 line: [phone].   NEVER MISS A DOSE OF ASPIRIN OR PLAVIX; IF YOU DO, YOU ARE AT RISK OF YOUR STENT CLOSING AND HAVING A HEART ATTACK. DO NOT STOP THESE TWO MEDICATIONS UNLESS INSTRUCTED TO DO SO BY YOUR CARDIOLOGIST.    Please make a follow up appointment with your cardiologist Dr Negrete within 1-2 weeks of your discharge. All of your prescriptions have been sent electronically to your pharmacy.  We have provided you with a prescription for cardiac rehab which is medically supervised exercise program for your heart and has been shown to improve the quantity and quality of life of people with heart disease like yours. You should attend cardiac rehab 3 times per week for 12 weeks. We have provided you with a list of nearby facilities. Please call your insurance carrier to determine which of these facilities are covered under your plan. Please bring this prescription with you to your follow up appointment with your cardiologist who can then further assist you to enroll into a cardiac rehab program.  TAKE ASPIRIN, PLAVIX, AND ELIQUIS X 2 WEEKS THEN STOP ASPIRIN AND CONTINUE PLAVIX AND ELIQUIS.         SECONDARY DISCHARGE DIAGNOSES  Diagnosis: Type 2 diabetes mellitus  Assessment and Plan of Treatment: If you are a diabetic and you take Metformin: DO NOT TAKE your Metformin for two days. This medication can interact with the contrast used during your procedure therefore we want to ensure the contrast has left your body prior to you restarting your Metformin.   Make sure you monitor your finger sticks and diet while you are not taking your Metformin!  PLEASE HOLD AND RESTART METFORMIN ON SUNDAY 4/25/21. Your Hemoglobin A1C is 8.2% and goal is <7%. Monitor Fingersticks before meals and at bedtime.       Diagnosis: HTN (hypertension)  Assessment and Plan of Treatment: Monitor Blood Pressure. Continue Current medications. Maintain Low Salt Diet-Less than 2000 mg daily.    Diagnosis: HLD (hyperlipidemia)  Assessment and Plan of Treatment: please continue crestor 40 mg daily

## 2021-04-23 ENCOUNTER — TRANSCRIPTION ENCOUNTER (OUTPATIENT)
Age: 61
End: 2021-04-23

## 2021-04-23 VITALS
RESPIRATION RATE: 16 BRPM | HEART RATE: 73 BPM | DIASTOLIC BLOOD PRESSURE: 70 MMHG | OXYGEN SATURATION: 97 % | SYSTOLIC BLOOD PRESSURE: 143 MMHG

## 2021-04-23 LAB
ANION GAP SERPL CALC-SCNC: 12 MMOL/L — SIGNIFICANT CHANGE UP (ref 5–17)
BASOPHILS # BLD AUTO: 0.03 K/UL — SIGNIFICANT CHANGE UP (ref 0–0.2)
BASOPHILS NFR BLD AUTO: 0.6 % — SIGNIFICANT CHANGE UP (ref 0–2)
BUN SERPL-MCNC: 31 MG/DL — HIGH (ref 7–23)
CALCIUM SERPL-MCNC: 9.7 MG/DL — SIGNIFICANT CHANGE UP (ref 8.4–10.5)
CHLORIDE SERPL-SCNC: 100 MMOL/L — SIGNIFICANT CHANGE UP (ref 96–108)
CO2 SERPL-SCNC: 28 MMOL/L — SIGNIFICANT CHANGE UP (ref 22–31)
COVID-19 SPIKE DOMAIN AB INTERP: POSITIVE
COVID-19 SPIKE DOMAIN ANTIBODY RESULT: 6.22 U/ML — HIGH
CREAT SERPL-MCNC: 1.22 MG/DL — SIGNIFICANT CHANGE UP (ref 0.5–1.3)
EOSINOPHIL # BLD AUTO: 0.15 K/UL — SIGNIFICANT CHANGE UP (ref 0–0.5)
EOSINOPHIL NFR BLD AUTO: 3 % — SIGNIFICANT CHANGE UP (ref 0–6)
GLUCOSE SERPL-MCNC: 191 MG/DL — HIGH (ref 70–99)
HCT VFR BLD CALC: 39.5 % — SIGNIFICANT CHANGE UP (ref 39–50)
HCV AB S/CO SERPL IA: 0.11 S/CO — SIGNIFICANT CHANGE UP
HCV AB SERPL-IMP: SIGNIFICANT CHANGE UP
HGB BLD-MCNC: 11.9 G/DL — LOW (ref 13–17)
IMM GRANULOCYTES NFR BLD AUTO: 0.8 % — SIGNIFICANT CHANGE UP (ref 0–1.5)
LYMPHOCYTES # BLD AUTO: 0.68 K/UL — LOW (ref 1–3.3)
LYMPHOCYTES # BLD AUTO: 13.5 % — SIGNIFICANT CHANGE UP (ref 13–44)
MAGNESIUM SERPL-MCNC: 2.4 MG/DL — SIGNIFICANT CHANGE UP (ref 1.6–2.6)
MCHC RBC-ENTMCNC: 27.3 PG — SIGNIFICANT CHANGE UP (ref 27–34)
MCHC RBC-ENTMCNC: 30.1 GM/DL — LOW (ref 32–36)
MCV RBC AUTO: 90.6 FL — SIGNIFICANT CHANGE UP (ref 80–100)
MONOCYTES # BLD AUTO: 0.63 K/UL — SIGNIFICANT CHANGE UP (ref 0–0.9)
MONOCYTES NFR BLD AUTO: 12.5 % — SIGNIFICANT CHANGE UP (ref 2–14)
NEUTROPHILS # BLD AUTO: 3.51 K/UL — SIGNIFICANT CHANGE UP (ref 1.8–7.4)
NEUTROPHILS NFR BLD AUTO: 69.6 % — SIGNIFICANT CHANGE UP (ref 43–77)
NRBC # BLD: 0 /100 WBCS — SIGNIFICANT CHANGE UP (ref 0–0)
PLATELET # BLD AUTO: 122 K/UL — LOW (ref 150–400)
POTASSIUM SERPL-MCNC: 4.2 MMOL/L — SIGNIFICANT CHANGE UP (ref 3.5–5.3)
POTASSIUM SERPL-SCNC: 4.2 MMOL/L — SIGNIFICANT CHANGE UP (ref 3.5–5.3)
RBC # BLD: 4.36 M/UL — SIGNIFICANT CHANGE UP (ref 4.2–5.8)
RBC # FLD: 15.9 % — HIGH (ref 10.3–14.5)
SARS-COV-2 IGG+IGM SERPL QL IA: 6.22 U/ML — HIGH
SARS-COV-2 IGG+IGM SERPL QL IA: POSITIVE
SODIUM SERPL-SCNC: 140 MMOL/L — SIGNIFICANT CHANGE UP (ref 135–145)
WBC # BLD: 5.04 K/UL — SIGNIFICANT CHANGE UP (ref 3.8–10.5)
WBC # FLD AUTO: 5.04 K/UL — SIGNIFICANT CHANGE UP (ref 3.8–10.5)

## 2021-04-23 PROCEDURE — 83735 ASSAY OF MAGNESIUM: CPT

## 2021-04-23 PROCEDURE — 93010 ELECTROCARDIOGRAM REPORT: CPT

## 2021-04-23 PROCEDURE — 82962 GLUCOSE BLOOD TEST: CPT

## 2021-04-23 PROCEDURE — 85730 THROMBOPLASTIN TIME PARTIAL: CPT

## 2021-04-23 PROCEDURE — 82248 BILIRUBIN DIRECT: CPT

## 2021-04-23 PROCEDURE — 86803 HEPATITIS C AB TEST: CPT

## 2021-04-23 PROCEDURE — 94660 CPAP INITIATION&MGMT: CPT

## 2021-04-23 PROCEDURE — 80061 LIPID PANEL: CPT

## 2021-04-23 PROCEDURE — 82553 CREATINE MB FRACTION: CPT

## 2021-04-23 PROCEDURE — C1887: CPT

## 2021-04-23 PROCEDURE — 80048 BASIC METABOLIC PNL TOTAL CA: CPT

## 2021-04-23 PROCEDURE — 80053 COMPREHEN METABOLIC PANEL: CPT

## 2021-04-23 PROCEDURE — C1769: CPT

## 2021-04-23 PROCEDURE — C1760: CPT

## 2021-04-23 PROCEDURE — 85025 COMPLETE CBC W/AUTO DIFF WBC: CPT

## 2021-04-23 PROCEDURE — 83036 HEMOGLOBIN GLYCOSYLATED A1C: CPT

## 2021-04-23 PROCEDURE — 85610 PROTHROMBIN TIME: CPT

## 2021-04-23 PROCEDURE — C1874: CPT

## 2021-04-23 PROCEDURE — C1725: CPT

## 2021-04-23 PROCEDURE — 82550 ASSAY OF CK (CPK): CPT

## 2021-04-23 PROCEDURE — 86769 SARS-COV-2 COVID-19 ANTIBODY: CPT

## 2021-04-23 PROCEDURE — 93005 ELECTROCARDIOGRAM TRACING: CPT

## 2021-04-23 PROCEDURE — C1894: CPT

## 2021-04-23 PROCEDURE — 36415 COLL VENOUS BLD VENIPUNCTURE: CPT

## 2021-04-23 RX ORDER — CLOPIDOGREL BISULFATE 75 MG/1
1 TABLET, FILM COATED ORAL
Qty: 0 | Refills: 0 | DISCHARGE

## 2021-04-23 RX ORDER — CLOPIDOGREL BISULFATE 75 MG/1
1 TABLET, FILM COATED ORAL
Qty: 30 | Refills: 11
Start: 2021-04-23 | End: 2022-04-17

## 2021-04-23 RX ORDER — APIXABAN 2.5 MG/1
1 TABLET, FILM COATED ORAL
Qty: 0 | Refills: 0 | DISCHARGE

## 2021-04-23 RX ORDER — APIXABAN 2.5 MG/1
1 TABLET, FILM COATED ORAL
Qty: 60 | Refills: 2
Start: 2021-04-23 | End: 2021-07-21

## 2021-04-23 RX ORDER — ASPIRIN/CALCIUM CARB/MAGNESIUM 324 MG
1 TABLET ORAL
Qty: 14 | Refills: 0
Start: 2021-04-23 | End: 2021-05-06

## 2021-04-23 RX ADMIN — Medication 8 UNIT(S): at 07:41

## 2021-04-23 RX ADMIN — Medication 81 MILLIGRAM(S): at 10:10

## 2021-04-23 RX ADMIN — APIXABAN 5 MILLIGRAM(S): 2.5 TABLET, FILM COATED ORAL at 05:53

## 2021-04-23 RX ADMIN — Medication 150 MICROGRAM(S): at 05:53

## 2021-04-23 RX ADMIN — AMLODIPINE BESYLATE 5 MILLIGRAM(S): 2.5 TABLET ORAL at 06:36

## 2021-04-23 RX ADMIN — Medication 2: at 07:40

## 2021-04-23 RX ADMIN — CLOPIDOGREL BISULFATE 75 MILLIGRAM(S): 75 TABLET, FILM COATED ORAL at 10:11

## 2021-04-28 DIAGNOSIS — I25.10 ATHEROSCLEROTIC HEART DISEASE OF NATIVE CORONARY ARTERY WITHOUT ANGINA PECTORIS: ICD-10-CM

## 2021-04-28 DIAGNOSIS — I12.9 HYPERTENSIVE CHRONIC KIDNEY DISEASE WITH STAGE 1 THROUGH STAGE 4 CHRONIC KIDNEY DISEASE, OR UNSPECIFIED CHRONIC KIDNEY DISEASE: ICD-10-CM

## 2021-04-28 DIAGNOSIS — Z20.822 CONTACT WITH AND (SUSPECTED) EXPOSURE TO COVID-19: ICD-10-CM

## 2021-04-28 DIAGNOSIS — G47.33 OBSTRUCTIVE SLEEP APNEA (ADULT) (PEDIATRIC): ICD-10-CM

## 2021-04-28 DIAGNOSIS — E11.22 TYPE 2 DIABETES MELLITUS WITH DIABETIC CHRONIC KIDNEY DISEASE: ICD-10-CM

## 2021-04-28 DIAGNOSIS — Z95.818 PRESENCE OF OTHER CARDIAC IMPLANTS AND GRAFTS: ICD-10-CM

## 2021-04-28 DIAGNOSIS — Z79.02 LONG TERM (CURRENT) USE OF ANTITHROMBOTICS/ANTIPLATELETS: ICD-10-CM

## 2021-04-28 DIAGNOSIS — N52.9 MALE ERECTILE DYSFUNCTION, UNSPECIFIED: ICD-10-CM

## 2021-04-28 DIAGNOSIS — I48.91 UNSPECIFIED ATRIAL FIBRILLATION: ICD-10-CM

## 2021-04-28 DIAGNOSIS — Z98.890 OTHER SPECIFIED POSTPROCEDURAL STATES: ICD-10-CM

## 2021-04-28 DIAGNOSIS — Z87.891 PERSONAL HISTORY OF NICOTINE DEPENDENCE: ICD-10-CM

## 2021-04-28 DIAGNOSIS — N18.2 CHRONIC KIDNEY DISEASE, STAGE 2 (MILD): ICD-10-CM

## 2021-04-28 DIAGNOSIS — E11.51 TYPE 2 DIABETES MELLITUS WITH DIABETIC PERIPHERAL ANGIOPATHY WITHOUT GANGRENE: ICD-10-CM

## 2021-04-28 DIAGNOSIS — E78.5 HYPERLIPIDEMIA, UNSPECIFIED: ICD-10-CM

## 2021-04-28 DIAGNOSIS — Z79.890 HORMONE REPLACEMENT THERAPY: ICD-10-CM

## 2021-04-28 DIAGNOSIS — Z79.4 LONG TERM (CURRENT) USE OF INSULIN: ICD-10-CM

## 2021-04-28 DIAGNOSIS — E03.9 HYPOTHYROIDISM, UNSPECIFIED: ICD-10-CM

## 2021-04-28 DIAGNOSIS — Z95.1 PRESENCE OF AORTOCORONARY BYPASS GRAFT: ICD-10-CM

## 2021-04-28 DIAGNOSIS — Z95.0 PRESENCE OF CARDIAC PACEMAKER: ICD-10-CM

## 2021-05-07 PROBLEM — I73.9 PERIPHERAL VASCULAR DISEASE, UNSPECIFIED: Chronic | Status: ACTIVE | Noted: 2021-04-22

## 2021-05-07 PROBLEM — Z87.438 PERSONAL HISTORY OF OTHER DISEASES OF MALE GENITAL ORGANS: Chronic | Status: ACTIVE | Noted: 2021-04-22

## 2021-05-07 PROBLEM — I10 ESSENTIAL (PRIMARY) HYPERTENSION: Chronic | Status: ACTIVE | Noted: 2021-04-22

## 2021-05-07 PROBLEM — I25.10 ATHEROSCLEROTIC HEART DISEASE OF NATIVE CORONARY ARTERY WITHOUT ANGINA PECTORIS: Chronic | Status: ACTIVE | Noted: 2021-04-22

## 2021-05-07 PROBLEM — Z86.79 PERSONAL HISTORY OF OTHER DISEASES OF THE CIRCULATORY SYSTEM: Chronic | Status: ACTIVE | Noted: 2021-04-22

## 2021-05-07 PROBLEM — G47.33 OBSTRUCTIVE SLEEP APNEA (ADULT) (PEDIATRIC): Chronic | Status: ACTIVE | Noted: 2021-04-22

## 2021-05-07 PROBLEM — E03.9 HYPOTHYROIDISM, UNSPECIFIED: Chronic | Status: ACTIVE | Noted: 2021-04-22

## 2021-05-07 PROBLEM — I48.91 UNSPECIFIED ATRIAL FIBRILLATION: Chronic | Status: ACTIVE | Noted: 2021-04-22

## 2021-05-07 PROBLEM — E78.5 HYPERLIPIDEMIA, UNSPECIFIED: Chronic | Status: ACTIVE | Noted: 2021-04-22

## 2021-05-07 PROBLEM — N18.2 CHRONIC KIDNEY DISEASE, STAGE 2 (MILD): Chronic | Status: ACTIVE | Noted: 2021-04-22

## 2021-05-14 ENCOUNTER — NON-APPOINTMENT (OUTPATIENT)
Age: 61
End: 2021-05-14

## 2021-05-14 ENCOUNTER — APPOINTMENT (OUTPATIENT)
Dept: CARDIOLOGY | Facility: CLINIC | Age: 61
End: 2021-05-14
Payer: COMMERCIAL

## 2021-05-14 ENCOUNTER — APPOINTMENT (OUTPATIENT)
Dept: ELECTROPHYSIOLOGY | Facility: CLINIC | Age: 61
End: 2021-05-14
Payer: COMMERCIAL

## 2021-05-14 VITALS
SYSTOLIC BLOOD PRESSURE: 110 MMHG | HEART RATE: 73 BPM | OXYGEN SATURATION: 95 % | DIASTOLIC BLOOD PRESSURE: 60 MMHG | RESPIRATION RATE: 16 BRPM | BODY MASS INDEX: 29.31 KG/M2 | HEIGHT: 73.5 IN | TEMPERATURE: 97.8 F | WEIGHT: 226 LBS

## 2021-05-14 DIAGNOSIS — Z78.9 OTHER SPECIFIED HEALTH STATUS: ICD-10-CM

## 2021-05-14 DIAGNOSIS — Z98.61 CORONARY ANGIOPLASTY STATUS: ICD-10-CM

## 2021-05-14 DIAGNOSIS — Z87.438 PERSONAL HISTORY OF OTHER DISEASES OF MALE GENITAL ORGANS: ICD-10-CM

## 2021-05-14 DIAGNOSIS — R06.02 SHORTNESS OF BREATH: ICD-10-CM

## 2021-05-14 DIAGNOSIS — Z72.3 LACK OF PHYSICAL EXERCISE: ICD-10-CM

## 2021-05-14 DIAGNOSIS — L03.119 CELLULITIS OF UNSPECIFIED PART OF LIMB: ICD-10-CM

## 2021-05-14 DIAGNOSIS — Z86.79 PERSONAL HISTORY OF OTHER DISEASES OF THE CIRCULATORY SYSTEM: ICD-10-CM

## 2021-05-14 DIAGNOSIS — Z98.890 OTHER SPECIFIED POSTPROCEDURAL STATES: ICD-10-CM

## 2021-05-14 DIAGNOSIS — Z87.891 PERSONAL HISTORY OF NICOTINE DEPENDENCE: ICD-10-CM

## 2021-05-14 DIAGNOSIS — J44.9 CHRONIC OBSTRUCTIVE PULMONARY DISEASE, UNSPECIFIED: ICD-10-CM

## 2021-05-14 PROCEDURE — 99072 ADDL SUPL MATRL&STAF TM PHE: CPT

## 2021-05-14 PROCEDURE — 99244 OFF/OP CNSLTJ NEW/EST MOD 40: CPT

## 2021-05-14 PROCEDURE — 93000 ELECTROCARDIOGRAM COMPLETE: CPT

## 2021-05-14 PROCEDURE — 93306 TTE W/DOPPLER COMPLETE: CPT

## 2021-05-14 RX ORDER — BLOOD-GLUCOSE METER
EACH MISCELLANEOUS
Refills: 0 | Status: ACTIVE | COMMUNITY

## 2021-05-14 NOTE — HISTORY OF PRESENT ILLNESS
[FreeTextEntry1] : The patient is a 60-year-old man who was referred by Dr. Brandi Perez from the [facility].\par \par Patient's complaint today has been symptoms of fatigue, weakness as well as shortness of breath.\par \par He is a 60-year-old man former smoker, history of diabetes, CKD stage II, coronary artery bypass surgery one-vessel and mitral valve repair at [facility] in 2003.  He has a prior history of Graves' disease and had radioactive iodine with subsequent hypothyroidism.  He also has a prior history of a right internal carotid artery stenosis and status post carotid endarterectomy.  Patient has had peripheral artery disease and status post PTA March 2021 at [facility] he apparently had cellulitis afterwards requiring admission for intravenous antibiotics.  Patient additionally has a history of COPD and obstructive sleep apnea.\par \par He has had atrial fibrillation for which he has been anticoagulated with Eliquis.\par \par He had an abnormal stress echo and was referred for cardiac catheterization which was done by Dr. Richard Ortiz at [facility] on 4/22/2021.  His ejection fraction was then noted to be 55%.  He had a stent to the ramus, diagonal 90% proximal LAD 30 to 50% lesion RCA mild diffuse disease.  His EDP was 25.\par \par Patient is now see me for evaluation of his atrial fibrillation.\par \par Atrial fibrillation history: It appears that the patient has had A. fib dating back to prior to his valve surgery in 2003.  In 2009 he was undergoing a cardioversion procedure and it was noted that he was spontaneously in sinus rhythm.  According to the patient he stayed in normal sinus rhythm for a long time after that.  His physician has told him that over the last 4 years he has been going in and out of A. fib.\par \par Occasional Wine\par Sleep Apnea - according to patient severe - not compliant with CPAP - uses sporadically. \par

## 2021-05-14 NOTE — REVIEW OF SYSTEMS
[Weight Gain (___ Lbs)] : [unfilled] ~Ulb weight gain [Fever] : no fever [Blurry Vision] : no blurred vision [Sore Throat] : no sore throat

## 2021-05-14 NOTE — CARDIOLOGY SUMMARY
[de-identified] : Possible atrial fibrillation  - occasional ectopic ventricular beat   \par Low voltage in limb leads.

## 2021-05-14 NOTE — DISCUSSION/SUMMARY
[FreeTextEntry1] : This is a patient who has had vascular disease including coronary disease and status post stent procedure.  His last ejection fraction was noted on angiogram at 55%.  He has symptoms of shortness of breath and fatigue and this is most likely from his atrial fibrillation - it is unclear to me the duration of his current episode whether its longer than a years duration.  Because of his symptoms I would recommend restoration of sinus rhythm.  I would like to review a more recent echocardiogram to assess his left atrial size and volume.   Based on the findings my recommendation would be for him to undergo a cardioversion procedure guided by LEON to restore sinus rhythm we would then see how he feels in sinus rhythm if his symptoms improve then it would be a reasonable plan for him to consider catheter ablation of atrial fibrillation.  The other option would be for us to place him on an antiarrhythmic.  The choice of antiarrhythmic in him is limited because of his coronary disease/vascular disease and I would prefer not to place him on amiodarone at least long-term.\par We also discussed lifestyle modification - weight loss, decrease wine intake, treatment of sleep apnea\par \par Final recommendations: We will review his prior echocardiogram and plan for LEON cardioversion and then subsequent catheter ablation procedure. (  For LEON - no swallowing issues, Esophageal issues)

## 2021-05-14 NOTE — PHYSICAL EXAM
[No Acute Distress] : no acute distress [Normal Conjunctiva] : normal conjunctiva [Normal Venous Pressure] : normal venous pressure [Normal S1, S2] : normal S1, S2 [No Murmur] : no murmur [Clear Lung Fields] : clear lung fields [Non Tender] : non-tender [Edema ___] : edema [unfilled] [de-identified] : Irregular [de-identified] : OBESE DISTENDED

## 2021-05-19 ENCOUNTER — NON-APPOINTMENT (OUTPATIENT)
Age: 61
End: 2021-05-19

## 2021-05-19 DIAGNOSIS — Z86.79 PERSONAL HISTORY OF OTHER DISEASES OF THE CIRCULATORY SYSTEM: ICD-10-CM

## 2021-05-19 DIAGNOSIS — I73.9 PERIPHERAL VASCULAR DISEASE, UNSPECIFIED: ICD-10-CM

## 2021-05-19 DIAGNOSIS — R53.83 OTHER FATIGUE: ICD-10-CM

## 2021-06-02 ENCOUNTER — APPOINTMENT (OUTPATIENT)
Dept: HEART AND VASCULAR | Facility: CLINIC | Age: 61
End: 2021-06-02
Payer: COMMERCIAL

## 2021-06-02 ENCOUNTER — NON-APPOINTMENT (OUTPATIENT)
Age: 61
End: 2021-06-02

## 2021-06-02 VITALS
SYSTOLIC BLOOD PRESSURE: 138 MMHG | BODY MASS INDEX: 28.23 KG/M2 | DIASTOLIC BLOOD PRESSURE: 63 MMHG | HEART RATE: 77 BPM | WEIGHT: 220 LBS | HEIGHT: 74 IN | TEMPERATURE: 97.3 F

## 2021-06-02 PROCEDURE — 99072 ADDL SUPL MATRL&STAF TM PHE: CPT

## 2021-06-02 PROCEDURE — 99215 OFFICE O/P EST HI 40 MIN: CPT

## 2021-06-02 PROCEDURE — 93000 ELECTROCARDIOGRAM COMPLETE: CPT

## 2021-06-08 NOTE — HISTORY OF PRESENT ILLNESS
[FreeTextEntry1] : 60 year old male with diabetes, CKD stage II, history of GRAVES disease s/p radioactive iodine therapy with subsequent hypothyroidism,  CAD s/p stents and CABG, mitral valve repair, KOFI repair and intra-op ablation 2003, sleep apnea on CPAP, COPD, PAD s/p leg stent and carotid endarterectomy and recurrent atrial fibrillation, who presents for initial evaluation.\par \par In 2003 he had 1 vessel CABG, MV repair, exclusion of KOFI and intra-op afib ablatio.  He did well post surgery with recurrence in 2009 but spontaneously converted to NSR.  ABout 4 years ago he noted that he was back in atrial fibrillation.  He complains of constant fatigue, SOB and lightheadedness.  No chest pain, palpitation or syncope.  He is compliant with ELiquis. \par

## 2021-06-08 NOTE — REVIEW OF SYSTEMS
[Fever] : no fever [Chills] : no chills [Feeling Fatigued] : feeling fatigued [Dyspnea on exertion] : dyspnea during exertion [Palpitations] : no palpitations [Syncope] : no syncope [Joint Pain] : no joint pain [Rash] : no rash [Dizziness] : no dizziness [Confusion] : no confusion was observed [Easy Bleeding] : no tendency for easy bleeding

## 2021-06-08 NOTE — CARDIOLOGY SUMMARY
[de-identified] : 6/6/21 afib with a ventricular rate of 77bpm [de-identified] : 5/14/21 mod MR. mod pHTN, mild OR, mod-sever TR. normal LA size.  EF 45% [de-identified] : s/p cardiac cath 4/22/21: s/p PTCA/DARYA to Ramus (80%), D1 90% (fills via \par LIMA), pLAD 30-50%, RCA mild diffuse dz.  EDP 25, EF 55%, no AS, no MR.

## 2021-06-08 NOTE — PHYSICAL EXAM
[Well Developed] : well developed [Well Nourished] : well nourished [No Acute Distress] : no acute distress [Normal Conjunctiva] : normal conjunctiva [Normal Venous Pressure] : normal venous pressure [5th Left ICS - MCL] : palpated at the 5th LICS in the midclavicular line [Normal Rate] : normal [Irregularly Irregular] : irregularly irregular [No Edema] : no edema [No Rash] : no rash [Moves all extremities] : moves all extremities [Alert and Oriented] : alert and oriented

## 2021-06-08 NOTE — DISCUSSION/SUMMARY
[FreeTextEntry1] :  60 year old male with diabetes, CKD stage II, history of GRAVES disease s/p radioactive iodine therapy with subsequent hypothyroidism,  CAD s/p stents and CABG, mitral valve repair, KOFI repair and intra-op ablation 2003, sleep apnea on CPAP, COPD, PAD s/p leg stent and carotid endarterectomy and recurrent atrial fibrillation, who presents for initial evaluation.  He is back in persistent symptomatic atrial fibrillation.  He is compliant with oral anticoagulation.  We discussed options of rate control, cardioversion +/- antiarrhythmic medication or an ablation.  I have recommended we proceed with a cardioversion to see if he is able to maintain NSR and how he feels in NSR, however he is interested in proceeding with an ablation.  We discussed the procedure in detail including risks, benefits and alternatives.   I have quoted him an approximately 60-70% chance for success and a 1:700 chance of major complication related to the procedure.  Risks including, but not limited to; infection, vascular injury, cardiac perforation, TE/CVA, phrenic nerve injury were discussed.  All questions answered.  He has had his COVID vaccine.  He was given pre procedure instructions.  He knows to call with any questions or concerns.  \par

## 2021-06-15 ENCOUNTER — INPATIENT (INPATIENT)
Facility: HOSPITAL | Age: 61
LOS: 0 days | Discharge: HOME | DRG: 274 | End: 2021-06-16
Attending: INTERNAL MEDICINE | Admitting: INTERNAL MEDICINE
Payer: COMMERCIAL

## 2021-06-15 VITALS
RESPIRATION RATE: 16 BRPM | HEART RATE: 70 BPM | DIASTOLIC BLOOD PRESSURE: 63 MMHG | OXYGEN SATURATION: 100 % | SYSTOLIC BLOOD PRESSURE: 120 MMHG

## 2021-06-15 DIAGNOSIS — Z98.890 OTHER SPECIFIED POSTPROCEDURAL STATES: Chronic | ICD-10-CM

## 2021-06-15 DIAGNOSIS — I48.19 OTHER PERSISTENT ATRIAL FIBRILLATION: ICD-10-CM

## 2021-06-15 DIAGNOSIS — Z87.19 PERSONAL HISTORY OF OTHER DISEASES OF THE DIGESTIVE SYSTEM: Chronic | ICD-10-CM

## 2021-06-15 DIAGNOSIS — Z95.1 PRESENCE OF AORTOCORONARY BYPASS GRAFT: Chronic | ICD-10-CM

## 2021-06-15 LAB
ANION GAP SERPL CALC-SCNC: 11 MMOL/L — SIGNIFICANT CHANGE UP (ref 5–17)
APTT BLD: 41.7 SEC — HIGH (ref 27.5–35.5)
BUN SERPL-MCNC: 27 MG/DL — HIGH (ref 7–23)
CALCIUM SERPL-MCNC: 9.4 MG/DL — SIGNIFICANT CHANGE UP (ref 8.4–10.5)
CHLORIDE SERPL-SCNC: 104 MMOL/L — SIGNIFICANT CHANGE UP (ref 96–108)
CO2 SERPL-SCNC: 25 MMOL/L — SIGNIFICANT CHANGE UP (ref 22–31)
CREAT SERPL-MCNC: 1.15 MG/DL — SIGNIFICANT CHANGE UP (ref 0.5–1.3)
GLUCOSE BLDC GLUCOMTR-MCNC: 140 MG/DL — HIGH (ref 70–99)
GLUCOSE BLDC GLUCOMTR-MCNC: 187 MG/DL — HIGH (ref 70–99)
GLUCOSE SERPL-MCNC: 165 MG/DL — HIGH (ref 70–99)
HCT VFR BLD CALC: 35.4 % — LOW (ref 39–50)
HGB BLD-MCNC: 10.8 G/DL — LOW (ref 13–17)
INR BLD: 2.14 — HIGH (ref 0.88–1.16)
MCHC RBC-ENTMCNC: 26.8 PG — LOW (ref 27–34)
MCHC RBC-ENTMCNC: 30.5 GM/DL — LOW (ref 32–36)
MCV RBC AUTO: 87.8 FL — SIGNIFICANT CHANGE UP (ref 80–100)
NRBC # BLD: 0 /100 WBCS — SIGNIFICANT CHANGE UP (ref 0–0)
PLATELET # BLD AUTO: 123 K/UL — LOW (ref 150–400)
POTASSIUM SERPL-MCNC: 4.3 MMOL/L — SIGNIFICANT CHANGE UP (ref 3.5–5.3)
POTASSIUM SERPL-SCNC: 4.3 MMOL/L — SIGNIFICANT CHANGE UP (ref 3.5–5.3)
PROTHROM AB SERPL-ACNC: 24.8 SEC — HIGH (ref 10.6–13.6)
RBC # BLD: 4.03 M/UL — LOW (ref 4.2–5.8)
RBC # FLD: 16.7 % — HIGH (ref 10.3–14.5)
SODIUM SERPL-SCNC: 140 MMOL/L — SIGNIFICANT CHANGE UP (ref 135–145)
WBC # BLD: 7.13 K/UL — SIGNIFICANT CHANGE UP (ref 3.8–10.5)
WBC # FLD AUTO: 7.13 K/UL — SIGNIFICANT CHANGE UP (ref 3.8–10.5)

## 2021-06-15 PROCEDURE — 93657 TX L/R ATRIAL FIB ADDL: CPT

## 2021-06-15 PROCEDURE — 93656 COMPRE EP EVAL ABLTJ ATR FIB: CPT

## 2021-06-15 PROCEDURE — 93662 INTRACARDIAC ECG (ICE): CPT | Mod: 26

## 2021-06-15 PROCEDURE — 99223 1ST HOSP IP/OBS HIGH 75: CPT

## 2021-06-15 PROCEDURE — 93623 PRGRMD STIMJ&PACG IV RX NFS: CPT | Mod: 26

## 2021-06-15 PROCEDURE — 93613 INTRACARDIAC EPHYS 3D MAPG: CPT

## 2021-06-15 RX ORDER — ATORVASTATIN CALCIUM 80 MG/1
80 TABLET, FILM COATED ORAL AT BEDTIME
Refills: 0 | Status: DISCONTINUED | OUTPATIENT
Start: 2021-06-15 | End: 2021-06-16

## 2021-06-15 RX ORDER — GABAPENTIN 400 MG/1
100 CAPSULE ORAL ONCE
Refills: 0 | Status: COMPLETED | OUTPATIENT
Start: 2021-06-15 | End: 2021-06-15

## 2021-06-15 RX ORDER — INSULIN DETEMIR 100/ML (3)
36 INSULIN PEN (ML) SUBCUTANEOUS
Qty: 0 | Refills: 0 | DISCHARGE

## 2021-06-15 RX ORDER — SODIUM CHLORIDE 9 MG/ML
1000 INJECTION, SOLUTION INTRAVENOUS
Refills: 0 | Status: DISCONTINUED | OUTPATIENT
Start: 2021-06-15 | End: 2021-06-16

## 2021-06-15 RX ORDER — CLOPIDOGREL BISULFATE 75 MG/1
75 TABLET, FILM COATED ORAL DAILY
Refills: 0 | Status: DISCONTINUED | OUTPATIENT
Start: 2021-06-15 | End: 2021-06-16

## 2021-06-15 RX ORDER — DEXTROSE 50 % IN WATER 50 %
15 SYRINGE (ML) INTRAVENOUS ONCE
Refills: 0 | Status: DISCONTINUED | OUTPATIENT
Start: 2021-06-15 | End: 2021-06-16

## 2021-06-15 RX ORDER — INSULIN LISPRO 100/ML
VIAL (ML) SUBCUTANEOUS ONCE
Refills: 0 | Status: COMPLETED | OUTPATIENT
Start: 2021-06-15 | End: 2021-06-15

## 2021-06-15 RX ORDER — LEVOTHYROXINE SODIUM 125 MCG
150 TABLET ORAL DAILY
Refills: 0 | Status: DISCONTINUED | OUTPATIENT
Start: 2021-06-15 | End: 2021-06-16

## 2021-06-15 RX ORDER — GLUCAGON INJECTION, SOLUTION 0.5 MG/.1ML
1 INJECTION, SOLUTION SUBCUTANEOUS ONCE
Refills: 0 | Status: DISCONTINUED | OUTPATIENT
Start: 2021-06-15 | End: 2021-06-16

## 2021-06-15 RX ORDER — VALSARTAN 80 MG/1
160 TABLET ORAL DAILY
Refills: 0 | Status: DISCONTINUED | OUTPATIENT
Start: 2021-06-15 | End: 2021-06-16

## 2021-06-15 RX ORDER — ROSUVASTATIN CALCIUM 5 MG/1
1 TABLET ORAL
Qty: 0 | Refills: 0 | DISCHARGE

## 2021-06-15 RX ORDER — INSULIN GLARGINE 100 [IU]/ML
32 INJECTION, SOLUTION SUBCUTANEOUS AT BEDTIME
Refills: 0 | Status: DISCONTINUED | OUTPATIENT
Start: 2021-06-15 | End: 2021-06-16

## 2021-06-15 RX ORDER — DEXTROSE 50 % IN WATER 50 %
25 SYRINGE (ML) INTRAVENOUS ONCE
Refills: 0 | Status: DISCONTINUED | OUTPATIENT
Start: 2021-06-15 | End: 2021-06-16

## 2021-06-15 RX ORDER — APIXABAN 2.5 MG/1
5 TABLET, FILM COATED ORAL
Refills: 0 | Status: DISCONTINUED | OUTPATIENT
Start: 2021-06-15 | End: 2021-06-16

## 2021-06-15 RX ORDER — DEXTROSE 50 % IN WATER 50 %
12.5 SYRINGE (ML) INTRAVENOUS ONCE
Refills: 0 | Status: DISCONTINUED | OUTPATIENT
Start: 2021-06-15 | End: 2021-06-16

## 2021-06-15 RX ORDER — FUROSEMIDE 40 MG
40 TABLET ORAL DAILY
Refills: 0 | Status: DISCONTINUED | OUTPATIENT
Start: 2021-06-15 | End: 2021-06-16

## 2021-06-15 RX ORDER — SERTRALINE 25 MG/1
1 TABLET, FILM COATED ORAL
Qty: 0 | Refills: 0 | DISCHARGE

## 2021-06-15 RX ORDER — PANTOPRAZOLE SODIUM 20 MG/1
40 TABLET, DELAYED RELEASE ORAL
Refills: 0 | Status: DISCONTINUED | OUTPATIENT
Start: 2021-06-15 | End: 2021-06-16

## 2021-06-15 RX ORDER — INSULIN DETEMIR 100/ML (3)
22 INSULIN PEN (ML) SUBCUTANEOUS
Qty: 0 | Refills: 0 | DISCHARGE

## 2021-06-15 RX ORDER — AMLODIPINE BESYLATE 2.5 MG/1
5 TABLET ORAL DAILY
Refills: 0 | Status: DISCONTINUED | OUTPATIENT
Start: 2021-06-15 | End: 2021-06-16

## 2021-06-15 RX ADMIN — GABAPENTIN 100 MILLIGRAM(S): 400 CAPSULE ORAL at 22:29

## 2021-06-15 RX ADMIN — Medication 1: at 21:42

## 2021-06-15 RX ADMIN — APIXABAN 5 MILLIGRAM(S): 2.5 TABLET, FILM COATED ORAL at 21:43

## 2021-06-15 RX ADMIN — INSULIN GLARGINE 32 UNIT(S): 100 INJECTION, SOLUTION SUBCUTANEOUS at 22:29

## 2021-06-15 RX ADMIN — LIDOCAINE HYDROCHLORIDE AND EPINEPHRINE 5 MILLILITER(S): 10; 10 INJECTION, SOLUTION INFILTRATION; PERINEURAL at 20:19

## 2021-06-15 RX ADMIN — ATORVASTATIN CALCIUM 80 MILLIGRAM(S): 80 TABLET, FILM COATED ORAL at 21:43

## 2021-06-15 NOTE — H&P ADULT - ASSESSMENT
60 year old male with diabetes, CKD stage II, history of GRAVES disease s/p radioactive iodine therapy with subsequent hypothyroidism, CAD s/p stents and CABG, mitral valve repair, KOFI repair and intra-op ablation 2003, sleep apnea on CPAP, COPD, PAD s/p leg stent and carotid endarterectomy and recurrent atrial fibrillation, who presents for elective ablation.

## 2021-06-15 NOTE — PROVIDER CONTACT NOTE (CHANGE IN STATUS NOTIFICATION) - ASSESSMENT
Routine assessment of bilateral groins revealed a bilateral ooze but both vital signs remain stable.

## 2021-06-15 NOTE — H&P ADULT - HISTORY OF PRESENT ILLNESS
60 year old male with diabetes, CKD stage II, history of GRAVES disease s/p radioactive iodine therapy with subsequent hypothyroidism, CAD s/p stents and CABG, mitral valve repair, KOFI repair and intra-op ablation , sleep apnea on CPAP, COPD, PAD s/p leg stent and carotid endarterectomy and recurrent atrial fibrillation, who presents for elective ablation.     In  he had 1 vessel CABG, MV repair, exclusion of KOFI and intra-op afib ablatio. He did well post surgery with recurrence in  but spontaneously converted to NSR. ABout 4 years ago he noted that he was back in atrial fibrillation. He complains of constant fatigue, SOB and lightheadedness. No chest pain, palpitation or syncope. He is compliant with ELiquis.       EC21 afib with a ventricular rate of 77bpm   Echo: 21 mod MR. mod pHTN, mild [place], mod-sever TR. normal LA size. EF 45%   Cardiac Cath/PCI: s/p cardiac cath 21: s/p PTCA/DARYA to Ramus (80%), D1 90% (fills via   LIMA), pLAD 30-50%, RCA mild diffuse dz. EDP 25, EF 55%, no AS, no MR.

## 2021-06-15 NOTE — PROVIDER CONTACT NOTE (CHANGE IN STATUS NOTIFICATION) - BACKGROUND
Patient had EPS and transeptal puncture done today left groin with vascade and right groin manual dc'd sheaths, ,bedrest until 11pm tonight.

## 2021-06-15 NOTE — PROVIDER CONTACT NOTE (CHANGE IN STATUS NOTIFICATION) - SITUATION
Bilateral groin dressings oozing after patient had coughed although he held both groin areas, Cardiac PA Nicole made aware 4X4 gauge and tagaderm left at the bedside, vital signs cycling every 15 minutes and stable will continue to monitor. Lab: 6 Was A Bandage Applied: Yes Bill For Surgical Tray: no Curettage Text: The wound bed was treated with curettage after the biopsy was performed. Notification Instructions: Patient will be notified of biopsy results. However, patient instructed to call the office if not contacted within 2 weeks. Consent: Written consent was obtained and risks were reviewed including but not limited to scarring, infection, bleeding, scabbing, incomplete removal, nerve damage and allergy to anesthesia. Additional Anesthesia Volume In Cc (Will Not Render If 0): 0 Anesthesia Type: 1% lidocaine with epinephrine Lab Facility: 3 Cryotherapy Text: The wound bed was treated with cryotherapy after the biopsy was performed. Electrodesiccation Text: The wound bed was treated with electrodesiccation after the biopsy was performed. Detail Level: Detailed Wound Care: Petrolatum Depth Of Biopsy: dermis Size Of Lesion In Cm: 0.7 Electrodesiccation And Curettage Text: The wound bed was treated with electrodesiccation and curettage after the biopsy was performed. Billing Type: Third-Party Bill Type Of Destruction Used: Curettage Biopsy Type: H and E Post-Care Instructions: I reviewed with the patient in detail post-care instructions. Patient is to keep the biopsy site dry overnight, and then apply bacitracin twice daily until healed. Patient may apply hydrogen peroxide soaks to remove any crusting. Dressing: bandage Anesthesia Volume In Cc (Will Not Render If 0): 0.3 Hemostasis: Ayana's Silver Nitrate Text: The wound bed was treated with silver nitrate after the biopsy was performed. Size Of Lesion In Cm: 2

## 2021-06-15 NOTE — H&P ADULT - NSHPSOCIALHISTORY_GEN_ALL_CORE
Caffeine use (V49.89) (Z78.9)  Does not exercise (V69.0) (Z72.3)  Former smoker (V15.82) (Z87.891)  Social alcohol use (V49.89) (Z78.9)

## 2021-06-15 NOTE — PATIENT PROFILE ADULT - PHONE #
Spoke with patient, offered patient October 12th, [place] location, 3:45pm; he can make that appointment. Appointment for November cancelled and moved to October 12th.   nnn [phone]

## 2021-06-15 NOTE — CHART NOTE - NSCHARTNOTEFT_GEN_A_CORE
[facility]  [id number]    PROCEDURE:  vascular access with US guidance  trans-septal puncture  3D mapping of right and left atrium  EP study      INDICATION:  symptomatic persistent AFib  mildly reduced LVEF      ELECTROPHYSIOLOGIST(S):  Dr. Garrett  Fellow Swetha Cabrera      ANESTHESIOLOGY:  GA      FINDINGS:  - vascular access with US guidance  - trans-septal puncture with ICE and fluoro  - Patient was cardioverted to sinus rhythm  - 3D mapping of left atrium showed durable PVI and posterior wall isolation (entrance and exit block), right atrium also had extensive scar except for small area by the sinus node region. These findings are consistent with history of Biatrial maze procedure.  - Isuprel up to 20 mcg did not induce any atrial arrhythmias and there was no evidence of non-PV triggers  - Patient has chronotropic incompetence as his AVN conduction improved on Isuprel but his HR only increased from 60s to 80s despite increasing doses of Isuprel.  - ICE showed no pericardial effusion  - hemostasis achieved with manual pressure on Rt. side and Vascade on left side      COMPLICATIONS:  none      RECOMMENDATIONS:  - monitor b/l groins for bleeding/hematoma  - resume Eliquis tonight  - keep NPO after mid night for possible pacemaker implant tomorrow

## 2021-06-16 ENCOUNTER — TRANSCRIPTION ENCOUNTER (OUTPATIENT)
Age: 61
End: 2021-06-16

## 2021-06-16 VITALS — TEMPERATURE: 98 F

## 2021-06-16 LAB
GLUCOSE BLDC GLUCOMTR-MCNC: 177 MG/DL — HIGH (ref 70–99)
GLUCOSE BLDC GLUCOMTR-MCNC: 494 MG/DL — CRITICAL HIGH (ref 70–99)

## 2021-06-16 PROCEDURE — 85730 THROMBOPLASTIN TIME PARTIAL: CPT

## 2021-06-16 PROCEDURE — C1894: CPT

## 2021-06-16 PROCEDURE — 85027 COMPLETE CBC AUTOMATED: CPT

## 2021-06-16 PROCEDURE — 99239 HOSP IP/OBS DSCHRG MGMT >30: CPT

## 2021-06-16 PROCEDURE — 80048 BASIC METABOLIC PNL TOTAL CA: CPT

## 2021-06-16 PROCEDURE — C1759: CPT

## 2021-06-16 PROCEDURE — 82962 GLUCOSE BLOOD TEST: CPT

## 2021-06-16 PROCEDURE — C1766: CPT

## 2021-06-16 PROCEDURE — C1730: CPT

## 2021-06-16 PROCEDURE — 86850 RBC ANTIBODY SCREEN: CPT

## 2021-06-16 PROCEDURE — 85610 PROTHROMBIN TIME: CPT

## 2021-06-16 PROCEDURE — 86900 BLOOD TYPING SEROLOGIC ABO: CPT

## 2021-06-16 PROCEDURE — 36415 COLL VENOUS BLD VENIPUNCTURE: CPT

## 2021-06-16 PROCEDURE — C1732: CPT

## 2021-06-16 PROCEDURE — 86901 BLOOD TYPING SEROLOGIC RH(D): CPT

## 2021-06-16 PROCEDURE — C1760: CPT

## 2021-06-16 RX ORDER — INSULIN LISPRO 100/ML
VIAL (ML) SUBCUTANEOUS
Refills: 0 | Status: DISCONTINUED | OUTPATIENT
Start: 2021-06-16 | End: 2021-06-16

## 2021-06-16 RX ORDER — INSULIN LISPRO 100/ML
VIAL (ML) SUBCUTANEOUS AT BEDTIME
Refills: 0 | Status: DISCONTINUED | OUTPATIENT
Start: 2021-06-16 | End: 2021-06-16

## 2021-06-16 RX ORDER — LIDOCAINE HYDROCHLORIDE AND EPINEPHRINE 10; 10 MG/ML; UG/ML
5 INJECTION, SOLUTION INFILTRATION; PERINEURAL ONCE
Refills: 0 | Status: COMPLETED | OUTPATIENT
Start: 2021-06-16 | End: 2021-06-15

## 2021-06-16 RX ADMIN — APIXABAN 5 MILLIGRAM(S): 2.5 TABLET, FILM COATED ORAL at 06:01

## 2021-06-16 RX ADMIN — CLOPIDOGREL BISULFATE 75 MILLIGRAM(S): 75 TABLET, FILM COATED ORAL at 10:47

## 2021-06-16 RX ADMIN — PANTOPRAZOLE SODIUM 40 MILLIGRAM(S): 20 TABLET, DELAYED RELEASE ORAL at 06:02

## 2021-06-16 RX ADMIN — Medication 40 MILLIGRAM(S): at 06:02

## 2021-06-16 RX ADMIN — Medication 1: at 07:07

## 2021-06-16 RX ADMIN — Medication 150 MICROGRAM(S): at 06:02

## 2021-06-16 RX ADMIN — AMLODIPINE BESYLATE 5 MILLIGRAM(S): 2.5 TABLET ORAL at 06:01

## 2021-06-16 RX ADMIN — VALSARTAN 160 MILLIGRAM(S): 80 TABLET ORAL at 10:47

## 2021-06-16 NOTE — DISCHARGE NOTE PROVIDER - NSDCCPTREATMENT_GEN_ALL_CORE_FT
PRINCIPAL PROCEDURE  Procedure: Electrophysiology study with 3D mapping of heart  Findings and Treatment:

## 2021-06-16 NOTE — DISCHARGE NOTE PROVIDER - CARE PROVIDERS DIRECT ADDRESSES
[email or web address]

## 2021-06-16 NOTE — DISCHARGE NOTE PROVIDER - HOSPITAL COURSE
60 year old male with diabetes, CKD stage II, history of GRAVES disease s/p radioactive iodine therapy with subsequent hypothyroidism, CAD s/p stents and CABG, mitral valve repair, KOFI repair and intra-op MAZE in 2003, sleep apnea on CPAP, COPD, PAD s/p leg stent and carotid endarterectomy.  He has fatigue with  activities. Echo: 5/14/21 mod MR. mod pHTN, mild MI, mod-sever TR. normal LA size. EF 45%   EKG showed no discernable P waves, concerning for and recurrent atrial fibrillation. As such, he came for EPS and afib ablation on 6/15/21.  3D mapping of left atrium showed durable PVI and posterior wall isolation (entrance and exit block), right atrium also had extensive scar except for small area by the sinus node region. These findings are consistent with history of Biatrial maze procedure.  No inducible atrial arrhythmias during EPS.  Patient likely has chronotropic incompetence as his AVN conduction improved on Isuprel but his HR only increased from 60s to 80s despite increasing doses of Isuprel.    Overnight no complaints. Groin wounds are stable w/o hematoma or bleeding.  Tele with VR ~60s.    Plan is for pt to go home today and have a heart monitor sent to him next week to wear for 1 week.  During this time, we can see how his HR response to activities (ie to see if chronotropic incompetence).  He will see us in office in 1 month.

## 2021-06-16 NOTE — DISCHARGE NOTE NURSING/CASE MANAGEMENT/SOCIAL WORK - PATIENT PORTAL LINK FT
You can access the FollowMyHealth Patient Portal offered by [facility] by registering at the following website: [email or web address]. By joining [email or web address]’s FollowMyHealth portal, you will also be able to view your health information using other applications (apps) compatible with our system.

## 2021-06-16 NOTE — DISCHARGE NOTE PROVIDER - NSDCFUADDINST_GEN_ALL_CORE_FT
You had an Electrophysiology Study on 6/15/21. No inducible atrial arrhythmias found.  No ablation was done. We did find a lot of scars in both atria. As such, the atrial signal you have is very small.  Plan is have your recover from the procedure. Take this week to relax --> no strenuous activities this week.  We will arrange for a heart monitor to be mailed to you by next week.  You should wear it for 1 week, during which time you can resume strenuous / normal activities.  The purpose of this monitor is to see how good your heart rate can mount when you are back to your activities. With that , we can further determine the need for pacemaker in the near future.   You need to mail the monitor back to the company once you are done wearing it.  - Follow up with Dr Garrett on 7/14/21 at 10:15 AM.    - Wound care instruction:  Avoid strenuous activities such as lifting, running, pushing or sex for 1 week in order to avoid bleeding complications in the groin.  If any questions about the groin, call us at [phone].  Ok to shower tonight.  Avoid bathing for 1 week

## 2021-06-16 NOTE — DISCHARGE NOTE PROVIDER - CARE PROVIDER_API CALL
Bony Garrett)  Cardiac Electrophysiology  [address], [address] [place] [address]  Phone: [phone]  Fax: [phone]  Follow Up Time:

## 2021-06-16 NOTE — DISCHARGE NOTE PROVIDER - NSDCMRMEDTOKEN_GEN_ALL_CORE_FT
amlodipine-valsartan 5 mg-160 mg oral tablet: 1 tab(s) orally once a day  clopidogrel 75 mg oral tablet: 1 tab(s) orally once a day  Eliquis 5 mg oral tablet: 1 tab(s) orally 2 times a day  eplerenone 25 mg oral tablet: 1 tab(s) orally once a day  furosemide 40 mg oral tablet: 1 tab(s) orally once a day  Januvia 100 mg oral tablet: 1 tab(s) orally once a day  Levemir 100 units/mL subcutaneous solution: 32 unit(s) subcutaneous once a day (at bedtime)  levothyroxine 150 mcg (0.15 mg) oral tablet: 1 tab(s) orally once a day  Lipitor 80 mg oral tablet: 1 tab(s) orally once a day  metFORMIN 500 mg oral tablet, extended release: 1 tab(s) orally once a day  NexIUM 40 mg oral delayed release capsule: 1 cap(s) orally once a day  NovoLOG 100 units/mL subcutaneous solution: 5 unit(s) subcutaneous 3 times a day  tadalafil 5 mg oral tablet: 1 tab(s) orally once a day

## 2021-06-21 DIAGNOSIS — I49.5 SICK SINUS SYNDROME: ICD-10-CM

## 2021-06-28 DIAGNOSIS — Z79.4 LONG TERM (CURRENT) USE OF INSULIN: ICD-10-CM

## 2021-06-28 DIAGNOSIS — N52.9 MALE ERECTILE DYSFUNCTION, UNSPECIFIED: ICD-10-CM

## 2021-06-28 DIAGNOSIS — Z79.01 LONG TERM (CURRENT) USE OF ANTICOAGULANTS: ICD-10-CM

## 2021-06-28 DIAGNOSIS — N18.2 CHRONIC KIDNEY DISEASE, STAGE 2 (MILD): ICD-10-CM

## 2021-06-28 DIAGNOSIS — E78.5 HYPERLIPIDEMIA, UNSPECIFIED: ICD-10-CM

## 2021-06-28 DIAGNOSIS — Z95.820 PERIPHERAL VASCULAR ANGIOPLASTY STATUS WITH IMPLANTS AND GRAFTS: ICD-10-CM

## 2021-06-28 DIAGNOSIS — I25.10 ATHEROSCLEROTIC HEART DISEASE OF NATIVE CORONARY ARTERY WITHOUT ANGINA PECTORIS: ICD-10-CM
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
## 2021-06-28 DIAGNOSIS — Z87.891 PERSONAL HISTORY OF NICOTINE DEPENDENCE: ICD-10-CM

## 2021-06-28 DIAGNOSIS — Z99.89 DEPENDENCE ON OTHER ENABLING MACHINES AND DEVICES: ICD-10-CM

## 2021-06-28 DIAGNOSIS — I48.19 OTHER PERSISTENT ATRIAL FIBRILLATION: ICD-10-CM

## 2021-06-28 DIAGNOSIS — E89.0 POSTPROCEDURAL HYPOTHYROIDISM: ICD-10-CM

## 2021-06-28 DIAGNOSIS — Z72.3 LACK OF PHYSICAL EXERCISE: ICD-10-CM

## 2021-06-28 DIAGNOSIS — J44.9 CHRONIC OBSTRUCTIVE PULMONARY DISEASE, UNSPECIFIED: ICD-10-CM

## 2021-06-28 DIAGNOSIS — E11.22 TYPE 2 DIABETES MELLITUS WITH DIABETIC CHRONIC KIDNEY DISEASE: ICD-10-CM

## 2021-06-28 DIAGNOSIS — E11.51 TYPE 2 DIABETES MELLITUS WITH DIABETIC PERIPHERAL ANGIOPATHY WITHOUT GANGRENE: ICD-10-CM

## 2021-06-28 DIAGNOSIS — I08.1 RHEUMATIC DISORDERS OF BOTH MITRAL AND TRICUSPID VALVES: ICD-10-CM

## 2021-06-28 DIAGNOSIS — Z95.1 PRESENCE OF AORTOCORONARY BYPASS GRAFT: ICD-10-CM

## 2021-06-28 DIAGNOSIS — I27.20 PULMONARY HYPERTENSION, UNSPECIFIED: ICD-10-CM

## 2021-06-28 DIAGNOSIS — Z79.02 LONG TERM (CURRENT) USE OF ANTITHROMBOTICS/ANTIPLATELETS: ICD-10-CM

## 2021-06-28 DIAGNOSIS — G47.33 OBSTRUCTIVE SLEEP APNEA (ADULT) (PEDIATRIC): ICD-10-CM

## 2021-06-28 DIAGNOSIS — Z88.0 ALLERGY STATUS TO PENICILLIN: ICD-10-CM

## 2021-06-28 DIAGNOSIS — I12.9 HYPERTENSIVE CHRONIC KIDNEY DISEASE WITH STAGE 1 THROUGH STAGE 4 CHRONIC KIDNEY DISEASE, OR UNSPECIFIED CHRONIC KIDNEY DISEASE: ICD-10-CM

## 2021-06-28 DIAGNOSIS — Z95.5 PRESENCE OF CORONARY ANGIOPLASTY IMPLANT AND GRAFT: ICD-10-CM

## 2021-06-29 ENCOUNTER — APPOINTMENT (OUTPATIENT)
Dept: HEART AND VASCULAR | Facility: CLINIC | Age: 61
End: 2021-06-29
Payer: COMMERCIAL

## 2021-06-29 PROCEDURE — 93248 EXT ECG>7D<15D REV&INTERPJ: CPT

## 2021-07-09 ENCOUNTER — NON-APPOINTMENT (OUTPATIENT)
Age: 61
End: 2021-07-09

## 2021-07-21 ENCOUNTER — NON-APPOINTMENT (OUTPATIENT)
Age: 61
End: 2021-07-21

## 2021-07-21 ENCOUNTER — APPOINTMENT (OUTPATIENT)
Dept: HEART AND VASCULAR | Facility: CLINIC | Age: 61
End: 2021-07-21
Payer: COMMERCIAL

## 2021-07-21 VITALS
TEMPERATURE: 97.1 F | WEIGHT: 215 LBS | BODY MASS INDEX: 27.59 KG/M2 | DIASTOLIC BLOOD PRESSURE: 69 MMHG | SYSTOLIC BLOOD PRESSURE: 168 MMHG | HEART RATE: 79 BPM | HEIGHT: 74 IN

## 2021-07-21 PROCEDURE — 99215 OFFICE O/P EST HI 40 MIN: CPT

## 2021-07-21 PROCEDURE — 99072 ADDL SUPL MATRL&STAF TM PHE: CPT

## 2021-07-21 PROCEDURE — 93000 ELECTROCARDIOGRAM COMPLETE: CPT

## 2021-07-21 NOTE — REVIEW OF SYSTEMS
[Feeling Fatigued] : feeling fatigued [Dyspnea on exertion] : dyspnea during exertion [Fever] : no fever [Chills] : no chills [Palpitations] : no palpitations [Syncope] : no syncope [Joint Pain] : no joint pain [Rash] : no rash [Confusion] : no confusion was observed [Anxiety] : anxiety [Under Stress] : under stress [Easy Bleeding] : no tendency for easy bleeding [Negative] : Neurological

## 2021-07-21 NOTE — DISCUSSION/SUMMARY
[FreeTextEntry1] : 60 year old male with diabetes, CKD stage II, history of GRAVES disease s/p radioactive iodine therapy with subsequent hypothyroidism,  CAD s/p stents and CABG, mitral valve repair, KOFI repair and intra-op ablation 2003, sleep apnea on CPAP, COPD, PAD s/p leg stent and carotid endarterectomy - now s/p EP study with extensive scar and no afib, who presents for follow up.   WE reviewed findings of his EP study with extensive bi-atrial scarring (area around sinus node with no scarring) and that chronotropic incompetence which was confirmed on his monitor is likely contributing to his symptoms.  I have asked him to get a cardiac MRI to assure there is no other cause for his biatrial scarring - though it is most likely from his MAZE.  After his MRI I have offered him a pacemaker given his severe symptoms and chronotropic incompetence.  He feels that he is too ill with heart failure to get these tests done electively as he can not walk from his car to a testing center without issues.  I have told him given his heart failure symptoms he should go to the ER - however, he is not able to do this today.  WE will work on arranging these procedures however, if his symptoms worsen I have told  him to go the the ER.  He will hopefully get a cardiac MRI closer to home and then come in for an elective pacemaker.  He knows to call with any questions or concerns.  \par

## 2021-07-21 NOTE — PHYSICAL EXAM
[Well Developed] : well developed [Well Nourished] : well nourished [No Acute Distress] : no acute distress [Normal Conjunctiva] : normal conjunctiva [Normal Venous Pressure] : normal venous pressure [5th Left ICS - MCL] : palpated at the 5th LICS in the midclavicular line [Normal Rate] : normal [No Edema] : no edema [No Rash] : no rash [Moves all extremities] : moves all extremities [Alert and Oriented] : alert and oriented [Rhythm Regular] : regular [Clear Lung Fields] : clear lung fields [Good Air Entry] : good air entry [No Respiratory Distress] : no respiratory distress  [No Cyanosis] : no cyanosis [No Focal Deficits] : no focal deficits

## 2021-07-21 NOTE — CARDIOLOGY SUMMARY
[de-identified] : 6/6/21 afib with a ventricular rate of 77bpm\par 7/21/21 no P waves ventricular rate 80 [de-identified] : 5/14/21 mod MR. mod pHTN, mild [place], mod-sever TR. normal LA size.  EF 45% [de-identified] : s/p cardiac cath 4/22/21: s/p PTCA/DARYA to Ramus (80%), D1 90% (fills via \par LIMA), pLAD 30-50%, RCA mild diffuse dz.  EDP 25, EF 55%, no AS, no MR.

## 2021-07-21 NOTE — HISTORY OF PRESENT ILLNESS
[FreeTextEntry1] : 60 year old male with diabetes, CKD stage II, history of GRAVES disease s/p radioactive iodine therapy with subsequent hypothyroidism,  CAD s/p stents and CABG, mitral valve repair, KOFI repair and intra-op ablation 2003, sleep apnea on CPAP, COPD, PAD s/p leg stent and carotid endarterectomy - now s/p EP study with extensive scar and no afib, who presents for follow up. \par \par In 2003 he had 1 vessel CABG, MV repair, exclusion of KOFI and intra-op afib ablatio.  He did well post surgery with recurrence in 2009 but spontaneously converted to NSR.  About 4 years ago he noted that he was back in atrial fibrillation.  He complained of constant fatigue, SOB and lightheadedness.  No chest pain, palpitation or syncope.  He is compliant with ELiquis and there was concern he was back in afib.  EP study with 3D mapping of the left atrium showed durable PVI and posterior wall isolation (entrance and exit block) R atrium also with extensive scar except for small area by the sinus node region.  No inducible atrial arrhythmias.   He was recommended for a pacemaker but wanted to think about it given chronotropic incompetence.  While in hospital isuprel was given but HR only went to 80s.  He was recommended for a MRI to rule out sarcoid or other causes for extensive scare (likely Maze) but states he is "too weak and too sick" to walk to the MRI.  \par \par Since then he was admitted to [place] with a GI bleed s/p endoscopy and now off plavix.  He complains of persistent lightheadedness and trouble doing any activities.  He has lower extremity edema and SOB.  Event monitor with rates  44- and no heart rate variability on trends

## 2021-07-29 ENCOUNTER — TRANSCRIPTION ENCOUNTER (OUTPATIENT)
Age: 61
End: 2021-07-29

## 2021-08-04 ENCOUNTER — APPOINTMENT (OUTPATIENT)
Dept: MRI IMAGING | Facility: HOSPITAL | Age: 61
End: 2021-08-04

## 2021-08-04 ENCOUNTER — OUTPATIENT (OUTPATIENT)
Dept: OUTPATIENT SERVICES | Facility: HOSPITAL | Age: 61
LOS: 1 days | End: 2021-08-04
Payer: COMMERCIAL

## 2021-08-04 DIAGNOSIS — Z98.890 OTHER SPECIFIED POSTPROCEDURAL STATES: Chronic | ICD-10-CM

## 2021-08-04 DIAGNOSIS — Z95.1 PRESENCE OF AORTOCORONARY BYPASS GRAFT: Chronic | ICD-10-CM

## 2021-08-04 DIAGNOSIS — Z87.19 PERSONAL HISTORY OF OTHER DISEASES OF THE DIGESTIVE SYSTEM: Chronic | ICD-10-CM

## 2021-08-04 PROCEDURE — 75561 CARDIAC MRI FOR MORPH W/DYE: CPT

## 2021-08-04 PROCEDURE — 75561 CARDIAC MRI FOR MORPH W/DYE: CPT | Mod: 26

## 2021-08-04 PROCEDURE — A9577: CPT

## 2021-08-05 ENCOUNTER — INPATIENT (INPATIENT)
Facility: HOSPITAL | Age: 61
LOS: 1 days | Discharge: HOME | DRG: 226 | End: 2021-08-07
Attending: INTERNAL MEDICINE | Admitting: INTERNAL MEDICINE
Payer: COMMERCIAL

## 2021-08-05 VITALS
OXYGEN SATURATION: 98 % | DIASTOLIC BLOOD PRESSURE: 79 MMHG | RESPIRATION RATE: 16 BRPM | SYSTOLIC BLOOD PRESSURE: 166 MMHG | HEART RATE: 79 BPM

## 2021-08-05 DIAGNOSIS — E11.9 TYPE 2 DIABETES MELLITUS WITHOUT COMPLICATIONS: ICD-10-CM

## 2021-08-05 DIAGNOSIS — I10 ESSENTIAL (PRIMARY) HYPERTENSION: ICD-10-CM

## 2021-08-05 DIAGNOSIS — I50.811 ACUTE RIGHT HEART FAILURE: ICD-10-CM

## 2021-08-05 DIAGNOSIS — E78.5 HYPERLIPIDEMIA, UNSPECIFIED: ICD-10-CM

## 2021-08-05 DIAGNOSIS — I48.91 UNSPECIFIED ATRIAL FIBRILLATION: ICD-10-CM

## 2021-08-05 DIAGNOSIS — Z87.19 PERSONAL HISTORY OF OTHER DISEASES OF THE DIGESTIVE SYSTEM: Chronic | ICD-10-CM

## 2021-08-05 DIAGNOSIS — Z95.1 PRESENCE OF AORTOCORONARY BYPASS GRAFT: Chronic | ICD-10-CM

## 2021-08-05 DIAGNOSIS — E03.9 HYPOTHYROIDISM, UNSPECIFIED: ICD-10-CM

## 2021-08-05 DIAGNOSIS — G47.33 OBSTRUCTIVE SLEEP APNEA (ADULT) (PEDIATRIC): ICD-10-CM

## 2021-08-05 DIAGNOSIS — I49.5 SICK SINUS SYNDROME: ICD-10-CM

## 2021-08-05 DIAGNOSIS — I27.20 PULMONARY HYPERTENSION, UNSPECIFIED: ICD-10-CM

## 2021-08-05 DIAGNOSIS — I25.10 ATHEROSCLEROTIC HEART DISEASE OF NATIVE CORONARY ARTERY WITHOUT ANGINA PECTORIS: ICD-10-CM

## 2021-08-05 DIAGNOSIS — N18.2 CHRONIC KIDNEY DISEASE, STAGE 2 (MILD): ICD-10-CM

## 2021-08-05 DIAGNOSIS — Z98.890 OTHER SPECIFIED POSTPROCEDURAL STATES: Chronic | ICD-10-CM

## 2021-08-05 LAB
GLUCOSE BLDC GLUCOMTR-MCNC: 183 MG/DL — HIGH (ref 70–99)
GLUCOSE BLDC GLUCOMTR-MCNC: 201 MG/DL — HIGH (ref 70–99)
GLUCOSE BLDC GLUCOMTR-MCNC: 210 MG/DL — HIGH (ref 70–99)

## 2021-08-05 PROCEDURE — 99253 IP/OBS CNSLTJ NEW/EST LOW 45: CPT | Mod: GC

## 2021-08-05 PROCEDURE — 99223 1ST HOSP IP/OBS HIGH 75: CPT

## 2021-08-05 PROCEDURE — 33249 INSJ/RPLCMT DEFIB W/LEAD(S): CPT

## 2021-08-05 RX ORDER — VALSARTAN 80 MG/1
160 TABLET ORAL DAILY
Refills: 0 | Status: DISCONTINUED | OUTPATIENT
Start: 2021-08-05 | End: 2021-08-07

## 2021-08-05 RX ORDER — LEVOTHYROXINE SODIUM 125 MCG
150 TABLET ORAL DAILY
Refills: 0 | Status: DISCONTINUED | OUTPATIENT
Start: 2021-08-05 | End: 2021-08-07

## 2021-08-05 RX ORDER — INSULIN GLARGINE 100 [IU]/ML
36 INJECTION, SOLUTION SUBCUTANEOUS AT BEDTIME
Refills: 0 | Status: DISCONTINUED | OUTPATIENT
Start: 2021-08-05 | End: 2021-08-07

## 2021-08-05 RX ORDER — SITAGLIPTIN 50 MG/1
1 TABLET, FILM COATED ORAL
Qty: 0 | Refills: 0 | DISCHARGE

## 2021-08-05 RX ORDER — DEXTROSE 50 % IN WATER 50 %
25 SYRINGE (ML) INTRAVENOUS ONCE
Refills: 0 | Status: DISCONTINUED | OUTPATIENT
Start: 2021-08-05 | End: 2021-08-07

## 2021-08-05 RX ORDER — INSULIN ASPART 100 [IU]/ML
5 INJECTION, SOLUTION SUBCUTANEOUS
Qty: 0 | Refills: 0 | DISCHARGE

## 2021-08-05 RX ORDER — FUROSEMIDE 40 MG
80 TABLET ORAL ONCE
Refills: 0 | Status: COMPLETED | OUTPATIENT
Start: 2021-08-05 | End: 2021-08-05

## 2021-08-05 RX ORDER — CEFAZOLIN SODIUM 1 G
2000 VIAL (EA) INJECTION EVERY 8 HOURS
Refills: 0 | Status: COMPLETED | OUTPATIENT
Start: 2021-08-05 | End: 2021-08-06

## 2021-08-05 RX ORDER — VANCOMYCIN HCL 1 G
1500 VIAL (EA) INTRAVENOUS ONCE
Refills: 0 | Status: DISCONTINUED | OUTPATIENT
Start: 2021-08-05 | End: 2021-08-05

## 2021-08-05 RX ORDER — AMLODIPINE BESYLATE 2.5 MG/1
5 TABLET ORAL DAILY
Refills: 0 | Status: DISCONTINUED | OUTPATIENT
Start: 2021-08-05 | End: 2021-08-06

## 2021-08-05 RX ORDER — INSULIN DETEMIR 100/ML (3)
32 INSULIN PEN (ML) SUBCUTANEOUS
Qty: 0 | Refills: 0 | DISCHARGE

## 2021-08-05 RX ORDER — ATORVASTATIN CALCIUM 80 MG/1
80 TABLET, FILM COATED ORAL AT BEDTIME
Refills: 0 | Status: DISCONTINUED | OUTPATIENT
Start: 2021-08-05 | End: 2021-08-07

## 2021-08-05 RX ORDER — EPLERENONE 50 MG/1
1 TABLET, FILM COATED ORAL
Qty: 0 | Refills: 0 | DISCHARGE

## 2021-08-05 RX ORDER — METFORMIN HYDROCHLORIDE 850 MG/1
1 TABLET ORAL
Qty: 0 | Refills: 0 | DISCHARGE

## 2021-08-05 RX ORDER — CEFAZOLIN SODIUM 1 G
VIAL (EA) INJECTION
Refills: 0 | Status: COMPLETED | OUTPATIENT
Start: 2021-08-05 | End: 2021-08-06

## 2021-08-05 RX ORDER — OXYCODONE AND ACETAMINOPHEN 5; 325 MG/1; MG/1
1 TABLET ORAL EVERY 4 HOURS
Refills: 0 | Status: DISCONTINUED | OUTPATIENT
Start: 2021-08-05 | End: 2021-08-06

## 2021-08-05 RX ORDER — DEXTROSE 50 % IN WATER 50 %
15 SYRINGE (ML) INTRAVENOUS ONCE
Refills: 0 | Status: DISCONTINUED | OUTPATIENT
Start: 2021-08-05 | End: 2021-08-07

## 2021-08-05 RX ORDER — INSULIN LISPRO 100/ML
VIAL (ML) SUBCUTANEOUS
Refills: 0 | Status: DISCONTINUED | OUTPATIENT
Start: 2021-08-05 | End: 2021-08-07

## 2021-08-05 RX ORDER — CLOPIDOGREL BISULFATE 75 MG/1
1 TABLET, FILM COATED ORAL
Qty: 0 | Refills: 0 | DISCHARGE

## 2021-08-05 RX ORDER — CARVEDILOL PHOSPHATE 80 MG/1
6.25 CAPSULE, EXTENDED RELEASE ORAL EVERY 12 HOURS
Refills: 0 | Status: DISCONTINUED | OUTPATIENT
Start: 2021-08-05 | End: 2021-08-06

## 2021-08-05 RX ORDER — GLUCAGON INJECTION, SOLUTION 0.5 MG/.1ML
1 INJECTION, SOLUTION SUBCUTANEOUS ONCE
Refills: 0 | Status: DISCONTINUED | OUTPATIENT
Start: 2021-08-05 | End: 2021-08-07

## 2021-08-05 RX ORDER — SODIUM CHLORIDE 9 MG/ML
1000 INJECTION, SOLUTION INTRAVENOUS
Refills: 0 | Status: DISCONTINUED | OUTPATIENT
Start: 2021-08-05 | End: 2021-08-07

## 2021-08-05 RX ORDER — FUROSEMIDE 40 MG
40 TABLET ORAL ONCE
Refills: 0 | Status: DISCONTINUED | OUTPATIENT
Start: 2021-08-05 | End: 2021-08-05

## 2021-08-05 RX ORDER — CEFAZOLIN SODIUM 1 G
2000 VIAL (EA) INJECTION ONCE
Refills: 0 | Status: COMPLETED | OUTPATIENT
Start: 2021-08-05 | End: 2021-08-05

## 2021-08-05 RX ORDER — ESOMEPRAZOLE MAGNESIUM 40 MG/1
1 CAPSULE, DELAYED RELEASE ORAL
Qty: 0 | Refills: 0 | DISCHARGE

## 2021-08-05 RX ORDER — CLOPIDOGREL BISULFATE 75 MG/1
75 TABLET, FILM COATED ORAL DAILY
Refills: 0 | Status: DISCONTINUED | OUTPATIENT
Start: 2021-08-05 | End: 2021-08-07

## 2021-08-05 RX ORDER — ACETAMINOPHEN 500 MG
650 TABLET ORAL EVERY 6 HOURS
Refills: 0 | Status: DISCONTINUED | OUTPATIENT
Start: 2021-08-05 | End: 2021-08-07

## 2021-08-05 RX ORDER — FUROSEMIDE 40 MG
80 TABLET ORAL ONCE
Refills: 0 | Status: COMPLETED | OUTPATIENT
Start: 2021-08-06 | End: 2021-08-06

## 2021-08-05 RX ORDER — INSULIN GLARGINE 100 [IU]/ML
32 INJECTION, SOLUTION SUBCUTANEOUS EVERY MORNING
Refills: 0 | Status: DISCONTINUED | OUTPATIENT
Start: 2021-08-06 | End: 2021-08-07

## 2021-08-05 RX ADMIN — Medication 1: at 17:28

## 2021-08-05 RX ADMIN — CLOPIDOGREL BISULFATE 75 MILLIGRAM(S): 75 TABLET, FILM COATED ORAL at 13:07

## 2021-08-05 RX ADMIN — Medication 2: at 21:53

## 2021-08-05 RX ADMIN — OXYCODONE AND ACETAMINOPHEN 1 TABLET(S): 5; 325 TABLET ORAL at 23:30

## 2021-08-05 RX ADMIN — Medication 80 MILLIGRAM(S): at 13:12

## 2021-08-05 RX ADMIN — Medication 2: at 12:53

## 2021-08-05 RX ADMIN — Medication 80 MILLIGRAM(S): at 19:24

## 2021-08-05 RX ADMIN — CARVEDILOL PHOSPHATE 6.25 MILLIGRAM(S): 80 CAPSULE, EXTENDED RELEASE ORAL at 19:24

## 2021-08-05 RX ADMIN — ATORVASTATIN CALCIUM 80 MILLIGRAM(S): 80 TABLET, FILM COATED ORAL at 21:28

## 2021-08-05 RX ADMIN — OXYCODONE AND ACETAMINOPHEN 1 TABLET(S): 5; 325 TABLET ORAL at 17:43

## 2021-08-05 RX ADMIN — Medication 100 MILLIGRAM(S): at 21:28

## 2021-08-05 RX ADMIN — OXYCODONE AND ACETAMINOPHEN 1 TABLET(S): 5; 325 TABLET ORAL at 18:43

## 2021-08-05 RX ADMIN — Medication 650 MILLIGRAM(S): at 21:08

## 2021-08-05 RX ADMIN — INSULIN GLARGINE 36 UNIT(S): 100 INJECTION, SOLUTION SUBCUTANEOUS at 21:54

## 2021-08-05 RX ADMIN — AMLODIPINE BESYLATE 5 MILLIGRAM(S): 2.5 TABLET ORAL at 13:04

## 2021-08-05 RX ADMIN — Medication 100 MILLIGRAM(S): at 08:58

## 2021-08-05 RX ADMIN — OXYCODONE AND ACETAMINOPHEN 1 TABLET(S): 5; 325 TABLET ORAL at 22:25

## 2021-08-05 RX ADMIN — Medication 650 MILLIGRAM(S): at 20:18

## 2021-08-05 RX ADMIN — VALSARTAN 160 MILLIGRAM(S): 80 TABLET ORAL at 17:28

## 2021-08-05 NOTE — H&P ADULT - ASSESSMENT
Mr. Jimenez is a 60 y.o. M with pmhx significant for diabetes, CKD stage II, history of GRAVES disease s/p radioactive iodine therapy with subsequent hypothyroidism, CAD s/p stents, most recently 4/2021 DARYA to ramus, and CABG, mitral valve repair, KOFI repair and intra-op MAZE ablation 2003, sleep apnea on CPAP (poor compliance), COPD, PAD s/p leg stent and carotid endarterectomy, AF s/p EP study with extensive scar (durable PVI and posterior wall isolation) and no inducible Afib, and chronotropic incompetence who presents today for a ICD vs pacemaker implant.

## 2021-08-05 NOTE — CONSULT NOTE ADULT - SUBJECTIVE AND OBJECTIVE BOX
PULMONARY SERVICE INITIAL CONSULT NOTE    HPI:  Mr. Jimenez is a 60 y.o. M with pmhx significant for diabetes, CKD stage II, history of GRAVES disease s/p radioactive iodine therapy with subsequent hypothyroidism, CAD s/p stents, most recently 4/2021 DARYA to ramus, and CABG, mitral valve repair, KOFI repair and intra-op MAZE ablation 2003, sleep apnea on CPAP (poor compliance), COPD, PAD s/p leg stent and carotid endarterectomy, AF s/p EP study with extensive scar (durable PVI and posterior wall isolation) and no inducible Afib, and chronotropic incompetence who presents today for a ICD vs pacemaker implant.       Pulmonary service was contacted due to concern for pulmonary htn and possible sarcoidosis. Patient had Cardiac MRI performed that showed scarring with nonischemic in origin and concern for sarcoidosis Patient is a former smoker who quit back in 2003, at the time smoked a pack and half a day for 10 years. States that has never been hospitalized for lung issues with no known lung issues in the family. 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 Denies any work exposures that he is aware of. There was also concern for pulmonary pressure elevation from vein stenosis or atrial fibrosis as he has had multiple ablations for his atrial fibrillation dating back to 2003. At time of examination he denies ever being told he had sarcoidosis and is resting comfortably in the chair with no complaints.      REVIEW OF SYSTEMS:  All additional ROS negative.    PAST MEDICAL & SURGICAL HISTORY:  Hypertension    Hyperlipidemia    Stage 2 chronic kidney disease    Atrial fibrillation    CAD (coronary artery disease)    H/O mitral valve stenosis    Hypothyroidism    H/O erectile dysfunction    PAD (peripheral artery disease)    MAEGAN on CPAP    Chronotropic incompetence with sinus node dysfunction    S/P CABG x 1    H/O salivary gland disease  S/p excision    S/P carotid endarterectomy        FAMILY HISTORY:  FH: CAD (coronary artery disease)  Parents, siblings        SOCIAL HISTORY:  Smoking Status: [ ] Current, [ ] Former, [ ] Never  Pack Years:    MEDICATIONS:  Pulmonary:    Antimicrobials:  ceFAZolin   IVPB      ceFAZolin   IVPB 2000 milliGRAM(s) IV Intermittent every 8 hours    Anticoagulants:  clopidogrel Tablet 75 milliGRAM(s) Oral daily    Onc:    GI/:    Endocrine:  atorvastatin 80 milliGRAM(s) Oral at bedtime  dextrose 40% Gel 15 Gram(s) Oral Once  dextrose 50% Injectable 25 Gram(s) IV Push Once  glucagon  Injectable 1 milliGRAM(s) IntraMuscular Once  insulin glargine Injectable (LANTUS) 36 Unit(s) SubCutaneous at bedtime  insulin lispro (ADMELOG) corrective regimen sliding scale   SubCutaneous Before meals and at bedtime  levothyroxine 150 MICROGram(s) Oral daily    Cardiac:  amLODIPine   Tablet 5 milliGRAM(s) Oral daily  carvedilol 6.25 milliGRAM(s) Oral every 12 hours  valsartan 160 milliGRAM(s) Oral daily    Other Medications:  acetaminophen   Tablet .. 650 milliGRAM(s) Oral every 6 hours PRN  dextrose 5%. 1000 milliLiter(s) IV Continuous <Continuous>      Allergies    lisinopril (Unknown)  penicillin (Unknown)    Intolerances        Vital Signs Last 24 Hrs  T(C): 36.6 (05 Aug 2021 14:35), Max: 36.6 (05 Aug 2021 14:35)  T(F): 97.8 (05 Aug 2021 14:35), Max: 97.8 (05 Aug 2021 14:35)  HR: 79 (05 Aug 2021 12:47) (79 - 79)  BP: 166/79 (05 Aug 2021 12:47) (166/79 - 166/79)  BP(mean): --  RR: 16 (05 Aug 2021 12:47) (16 - 16)  SpO2: 98% (05 Aug 2021 12:47) (98% - 98%)    08-05 @ 07:01  -  08-05 @ 16:48  --------------------------------------------------------  IN: 0 mL / OUT: 1300 mL / NET: -1300 mL          PHYSICAL EXAM:  Constitutional: WDWN  Head: NC/AT  EENT: PERRL, anicteric sclera; oropharynx clear, MMM  Neck: supple, no appreciable JVD  Respiratory: CTA B/L; no W/R/R  Cardiovascular: +S1/S2, RRR  Gastrointestinal: soft, NT/ND  Extremities: WWP; no edema, clubbing or cyanosis  Vascular: 2+ radial pulses B/L  Neurological: awake and alert; GAXIOLA    LABS:                                RADIOLOGY & ADDITIONAL STUDIES:

## 2021-08-05 NOTE — H&P ADULT - NSHPPHYSICALEXAM_GEN_ALL_CORE
Constitutional: No acute Distress  Psych: Normal affect, normal mood  Neuro: A/o x 3, No focal deficits  Neck: Supple, NO JVD  CVS: S1 S2, No M/R/G, b/l LE edema 2+  Pulmonary: CTAB, Breathing unlabored, No Rhonchi/Rales/Wheezing  GI: Soft, Non -tender, +BS x 4 quads  Skin: No rash, warm and dry, no erythematous areas   MSK: 5/5 strength, normal range of motion, no swollen or erythematous joints. Constitutional: No acute Distress  Psych: Normal affect, normal mood  Neuro: A/o x 3, No focal deficits  Neck: Supple, NO JVD  CVS: S1 S2, No M/R/G, b/l LE edema 1+  Pulmonary: CTAB, Breathing unlabored, No Rhonchi/Rales/Wheezing  GI: Soft, Non -tender, +BS x 4 quads  Skin: No rash, warm and dry, no erythematous areas   MSK: 5/5 strength, normal range of motion, no swollen or erythematous joints.

## 2021-08-05 NOTE — CONSULT NOTE ADULT - ATTENDING COMMENTS
Extensive cardiac history as above with concerns for sarcoidosis and resultant pulmonary hypertension. Recent TTE with severe TR so do not know the extent of PH (if present) given the underestimation that would occur with severe TR. Does not have pulmonary symptoms c/w sarcoidosis but would still like to get a CT chest for evaluation. If cardiac sarcoid is present AICD was already placed for primary prevention. Has had some form of workup with Dr. Soto at [facility]; will get records and review prior to pursuing further workup (except CT chest). If confirmed to have PH, he would have multifactorial PH since he already has known COPD, MAEGAN, and Heart disease (group III and II, respectively). Should get a RHC to evaluate right sided pressures for definitve diagosis. Extensive cardiac history as above with concerns for sarcoidosis and resultant pulmonary hypertension. Recent TTE with severe TR so do not know the extent of PH (if present) given the underestimation that would occur with severe TR. Does not have pulmonary symptoms c/w sarcoidosis but would still like to get a CT chest for evaluation. If cardiac sarcoid is present AICD was already placed for primary prevention. Has had some form of workup with Dr. Soto at [facility]; will get records and review prior to pursuing further workup (except CT chest). If confirmed to have PH, he would have multifactorial PH since he already has known COPD, MAEGAN, and Heart disease (group III and II, respectively). Should get a RHC to evaluate right sided pressures for definitive diagnosis.

## 2021-08-05 NOTE — H&P ADULT - NSICDXPASTMEDICALHX_GEN_ALL_CORE_FT
PAST MEDICAL HISTORY:  Atrial fibrillation     CAD (coronary artery disease)     Chronotropic incompetence with sinus node dysfunction     H/O erectile dysfunction     H/O mitral valve stenosis     Hyperlipidemia     Hypertension     Hypothyroidism     MAEGAN on CPAP     PAD (peripheral artery disease)     Stage 2 chronic kidney disease

## 2021-08-05 NOTE — PATIENT PROFILE ADULT - NSPROGENSOURCEINFO_GEN_A_NUR
Problem: PAIN -   Goal: Displays adequate comfort level or baseline comfort level  Description  INTERVENTIONS:  - Perform pain scoring using age-appropriate tool with hands-on care as needed  Notify physician/AP of high pain scores not responsive to comfort measures  - Administer analgesics based on type and severity of pain and evaluate response  - Sucrose analgesia per protocol for brief minor painful procedures  - Teach parents interventions for comforting infant  Outcome: Progressing     Problem: THERMOREGULATION - /PEDIATRICS  Goal: Maintains normal body temperature  Description  Interventions:  - Monitor temperature (axillary for Newborns) as ordered  - Monitor for signs of hypothermia or hyperthermia  - Provide thermal support measures  - Wean to open crib when appropriate  Outcome: Progressing     Problem: INFECTION -   Goal: No evidence of infection  Description  INTERVENTIONS:  - Instruct family/visitors to use good hand hygiene technique  - Identify and instruct in appropriate isolation precautions for identified infection/condition  - Change incubator every 2 weeks or as needed  - Monitor for symptoms of infection  - Monitor surgical sites and insertion sites for all indwelling lines, tubes, and drains for drainage, redness, or edema   - Monitor endotracheal and nasal secretions for changes in amount and color  - Monitor culture and CBC results  - Administer antibiotics as ordered    Monitor drug levels  Outcome: Progressing     Problem: SAFETY -   Goal: Patient will remain free from falls  Description  INTERVENTIONS:  - Instruct family/caregiver on patient safety  - Keep incubator doors and portholes closed when unattended  - Keep radiant warmer side rails and crib rails up when unattended  - Based on caregiver fall risk screen, instruct family/caregiver to ask for assistance with transferring infant if caregiver noted to have fall risk factors  Outcome: Progressing Problem: Knowledge Deficit  Goal: Patient/family/caregiver demonstrates understanding of disease process, treatment plan, medications, and discharge instructions  Description  Complete learning assessment and assess knowledge base  Interventions:  - Provide teaching at level of understanding  - Provide teaching via preferred learning methods  Outcome: Progressing  Goal: Infant caregiver verbalizes understanding of benefits of skin-to-skin with healthy   Description  Prior to delivery, educate patient regarding skin-to-skin practice and its benefits  Initiate immediate and uninterrupted skin-to-skin contact after birth until breastfeeding is initiated or a minimum of one hour  Encourage continued skin-to-skin contact throughout the post partum stay    Outcome: Progressing  Goal: Infant caregiver verbalizes understanding of benefits and management of breastfeeding their healthy   Description  Help initiate breastfeeding within one hour of birth  Educate/assist with breastfeeding positioning and latch  Educate on safe positioning and to monitor their  for safety  Educate on how to maintain lactation even if they are  from their   Educate/initiate pumping for a mom with a baby in the NICU within 6 hours after birth  Give infants no food or drink other than breast milk unless medically indicated  Educate on feeding cues and encourage breastfeeding on demand    Outcome: Progressing  Goal: Infant caregiver verbalizes understanding of benefits to rooming-in with their healthy   Description  Promote rooming in 23 out of 24 hours per day  Educate on benefits to rooming-in  Provide  care in room with parents as long as infant and mother condition allow    Outcome: Progressing  Goal: Infant caregiver verbalizes understanding of support and resources for follow up after discharge  Description  Provide individual discharge education on when to call the doctor    Provide resources and contact information for post-discharge support  Outcome: Progressing     Problem: DISCHARGE PLANNING  Goal: Discharge to home or other facility with appropriate resources  Description  INTERVENTIONS:  - Identify barriers to discharge w/patient and caregiver  - Arrange for needed discharge resources and transportation as appropriate  - Identify discharge learning needs (meds, wound care, etc )  - Arrange for interpretive services to assist at discharge as needed  - Refer to Case Management Department for coordinating discharge planning if the patient needs post-hospital services based on physician/advanced practitioner order or complex needs related to functional status, cognitive ability, or social support system  Outcome: Progressing     Problem: Adequate NUTRIENT INTAKE -   Goal: Nutrient/Hydration intake appropriate for improving, restoring or maintaining nutritional needs  Description  INTERVENTIONS:  - Assess growth and nutritional status of patients and recommend course of action  - Monitor nutrient intake, labs, and treatment plans  - Recommend appropriate diets and vitamin/mineral supplements  - Monitor and recommend adjustments to tube feedings and TPN/PPN based on assessed needs  - Provide specific nutrition education as appropriate  Outcome: Progressing  Goal: Breast feeding baby will demonstrate adequate intake  Description  Interventions:  - Monitor/record daily weights and I&O  - Monitor milk transfer  - Increase maternal fluid intake  - Increase breastfeeding frequency and duration  - Teach mother to massage breast before feeding/during infant pauses during feeding  - Pump breast after feeding  - Review breastfeeding discharge plan with mother   Refer to breast feeding support groups  - Initiate discussion/inform physician of weight loss and interventions taken  - Help mother initiate breast feeding within an hour of birth  - Encourage skin to skin time with  within 11 minutes of birth  - Give  no food or drink other than breast milk  - Encourage rooming in  - Encourage breast feeding on demand  - Initiate SLP consult as needed  Outcome: Progressing     Problem: NORMAL   Goal: Experiences normal transition  Description  INTERVENTIONS:  - Monitor vital signs  - Maintain thermoregulation  - Assess for hypoglycemia risk factors or signs and symptoms  - Assess for sepsis risk factors or signs and symptoms  - Assess for jaundice risk and/or signs and symptoms  Outcome: Progressing  Goal: Total weight loss less than 10% of birth weight  Description  INTERVENTIONS:  - Assess feeding patterns  - Weigh daily  Outcome: Progressing patient

## 2021-08-05 NOTE — H&P ADULT - PROBLEM SELECTOR PLAN 1
- The dual chamber ICD implant procedure along with associated risks were discussed in detail, including but not limited to infection, bleeding, pneumothorax, perforation and effusion.   - Admit overnight for diuresis. The patient is overloaded on exam. Lasix 80mg IVP ordered post-case.  - Monitor pocket for bleeding/hematoma.  - CXR and device check in am.

## 2021-08-05 NOTE — H&P ADULT - PROBLEM SELECTOR PLAN 3
- Hold Eliquis tonight and tomorrow morning. - Most recent stent 4/2021. Pt on dual therapy (Eliquis, Plavix). Plavix reordered for tonight but Eliquis on hold until tomorrow evening depending on pocket evaluation.

## 2021-08-05 NOTE — H&P ADULT - PROBLEM SELECTOR PLAN 2
- Most recent stent 4/2021. Pt on dual therapy (Eliquis, Plavix). Plavix reordered for tonight but Eliquis on hold until tomorrow evening depending on pocket evaluation. - Diurese with 80mg IVP lasix post-procedure. Will reevaluate tonight for possible additional dose.   - Start Coreg 6.25mg BID.  - Continue valsartan.

## 2021-08-05 NOTE — CONSULT NOTE ADULT - ASSESSMENT
1. Pulmonary HTN  2. Sarcoidosis  3. Atrial fibrillation    60 year old male presents for AICD placement with multiple ablations in the past. Recent cardiac MRI that showed non-ischemic scarring concerning for sarcoidosis. Because of these concerns pulmonary service was contacted for further workup. Did see pulmonary at [facility] but uncertain as to workup. Will obtain records from office. Will need CT of the chest for dedicated lung imaging. Will hold off on aggressive workup until more records can be obtained as we do not want to put him through double workup. Will see what records come and make further decisions based off that. If nothing else has been done would benefit from full pulm HTN work up with v/q, sleep study, ACE level, vitamin D. He has known hx of sleep apnea and is on CPAP at home so that gives him some level of group 3 as well as group 2 with known CHF history and volume overload. Connective tissue workup may be needed as well to evaluate cause of type 1 but will await for records as this workup is extensive.     Recommendations:    - CT of the chest for dedicated lung imagining and evaluate for pulmonary sarcoidosis  - Review records from [facility] and pursue further work up  - ACE and Vitamin D level for further sarcoidosis evaluation  - If no work has bee done for Pulm HTN then will need v/q, connective tissue eval, as well as Right heart cath.

## 2021-08-05 NOTE — H&P ADULT - HISTORY OF PRESENT ILLNESS
Mr. Jimenez is a 60 y.o. M with pmhx significant for  Mr. Jimenez is a 60 y.o. M with pmhx significant for diabetes, CKD stage II, history of GRAVES disease s/p radioactive iodine therapy with subsequent hypothyroidism, CAD s/p stents, most recently 4/2021 DARYA to ramus and CABG, mitral valve repair, KOFI repair and intra-op ablation 2003, sleep apnea on CPAP, COPD, PAD s/p leg stent and carotid endarterectomy - now s/p EP study with extensive scar and no afib, who presents for follow up.     In 2003 he had 1 vessel CABG, MV repair, exclusion of KOFI and intra-op afib ablatio. He did well post surgery with recurrence in 2009 but spontaneously converted to NSR. About 4 years ago he noted that he was back in atrial fibrillation. He complained of constant fatigue, SOB and lightheadedness. No chest pain, palpitation or syncope. He is compliant with ELiquis and there was concern he was back in afib. EP study with 3D mapping of the left atrium showed durable PVI and posterior wall isolation (entrance and exit block) R atrium also with extensive scar except for small area by the sinus node region. No inducible atrial arrhythmias. He was recommended for a pacemaker but wanted to think about it given chronotropic incompetence. While in hospital isuprel was given but HR only went to 80s. He was recommended for a MRI to rule out sarcoid or other causes for extensive scare (likely Maze) but states he is "too weak and too sick" to walk to the MRI.     Since then he was admitted to [facility] with a GI bleed s/p endoscopy and now off plavix. He complains of persistent lightheadedness and trouble doing any activities. He has lower extremity edema and SOB. Event monitor with rates 44- and no heart rate variability on trends  Mr. Jimenez is a 60 y.o. M with pmhx significant for diabetes, CKD stage II, history of GRAVES disease s/p radioactive iodine therapy with subsequent hypothyroidism, CAD s/p stents, most recently 4/2021 DARYA to ramus, and CABG, mitral valve repair, KOFI repair and intra-op MAZE ablation 2003, sleep apnea on CPAP (poor compliance), COPD, PAD s/p leg stent and carotid endarterectomy, AF s/p EP study with extensive scar (durable PVI and posterior wall isolation) and no inducible Afib, and chronotropic incompetence who presents today for a ICD vs pacemaker implant.       He had a cMRI last night (8/4) with preliminary results showing mod-severe MR and TR, patchy endocardial and epicardial scar suggestive of sarcoid and LVEF 40%. The decision was made to proceed with ICD.     He reports feeling very sob on exertion and complains of LE edema. At rest he denies sob, c/p, palpitations and lightheadedness.

## 2021-08-06 ENCOUNTER — TRANSCRIPTION ENCOUNTER (OUTPATIENT)
Age: 61
End: 2021-08-06

## 2021-08-06 DIAGNOSIS — I50.23 ACUTE ON CHRONIC SYSTOLIC (CONGESTIVE) HEART FAILURE: ICD-10-CM

## 2021-08-06 PROBLEM — I49.5 SICK SINUS SYNDROME: Chronic | Status: ACTIVE | Noted: 2021-08-05

## 2021-08-06 LAB
ALBUMIN SERPL ELPH-MCNC: 3.8 G/DL — SIGNIFICANT CHANGE UP (ref 3.3–5)
ALP SERPL-CCNC: 99 U/L — SIGNIFICANT CHANGE UP (ref 40–120)
ALT FLD-CCNC: 11 U/L — SIGNIFICANT CHANGE UP (ref 10–45)
ANION GAP SERPL CALC-SCNC: 10 MMOL/L — SIGNIFICANT CHANGE UP (ref 5–17)
ANION GAP SERPL CALC-SCNC: 9 MMOL/L — SIGNIFICANT CHANGE UP (ref 5–17)
AST SERPL-CCNC: 16 U/L — SIGNIFICANT CHANGE UP (ref 10–40)
BILIRUB SERPL-MCNC: 0.5 MG/DL — SIGNIFICANT CHANGE UP (ref 0.2–1.2)
BUN SERPL-MCNC: 41 MG/DL — HIGH (ref 7–23)
BUN SERPL-MCNC: 48 MG/DL — HIGH (ref 7–23)
CALCIUM SERPL-MCNC: 10 MG/DL — SIGNIFICANT CHANGE UP (ref 8.4–10.5)
CALCIUM SERPL-MCNC: 9.7 MG/DL — SIGNIFICANT CHANGE UP (ref 8.4–10.5)
CHLORIDE SERPL-SCNC: 98 MMOL/L — SIGNIFICANT CHANGE UP (ref 96–108)
CHLORIDE SERPL-SCNC: 99 MMOL/L — SIGNIFICANT CHANGE UP (ref 96–108)
CO2 SERPL-SCNC: 29 MMOL/L — SIGNIFICANT CHANGE UP (ref 22–31)
CO2 SERPL-SCNC: 31 MMOL/L — SIGNIFICANT CHANGE UP (ref 22–31)
COVID-19 SPIKE DOMAIN AB INTERP: POSITIVE
COVID-19 SPIKE DOMAIN ANTIBODY RESULT: 173 U/ML — HIGH
CREAT SERPL-MCNC: 1.73 MG/DL — HIGH (ref 0.5–1.3)
CREAT SERPL-MCNC: 1.89 MG/DL — HIGH (ref 0.5–1.3)
GLUCOSE BLDC GLUCOMTR-MCNC: 181 MG/DL — HIGH (ref 70–99)
GLUCOSE BLDC GLUCOMTR-MCNC: 188 MG/DL — HIGH (ref 70–99)
GLUCOSE BLDC GLUCOMTR-MCNC: 200 MG/DL — HIGH (ref 70–99)
GLUCOSE BLDC GLUCOMTR-MCNC: 221 MG/DL — HIGH (ref 70–99)
GLUCOSE SERPL-MCNC: 171 MG/DL — HIGH (ref 70–99)
GLUCOSE SERPL-MCNC: 171 MG/DL — HIGH (ref 70–99)
HCT VFR BLD CALC: 28.2 % — LOW (ref 39–50)
HCT VFR BLD CALC: 30.1 % — LOW (ref 39–50)
HGB BLD-MCNC: 8.4 G/DL — LOW (ref 13–17)
HGB BLD-MCNC: 9 G/DL — LOW (ref 13–17)
MAGNESIUM SERPL-MCNC: 2.2 MG/DL — SIGNIFICANT CHANGE UP (ref 1.6–2.6)
MCHC RBC-ENTMCNC: 25.3 PG — LOW (ref 27–34)
MCHC RBC-ENTMCNC: 25.4 PG — LOW (ref 27–34)
MCHC RBC-ENTMCNC: 29.8 GM/DL — LOW (ref 32–36)
MCHC RBC-ENTMCNC: 29.9 GM/DL — LOW (ref 32–36)
MCV RBC AUTO: 84.6 FL — SIGNIFICANT CHANGE UP (ref 80–100)
MCV RBC AUTO: 85.2 FL — SIGNIFICANT CHANGE UP (ref 80–100)
NRBC # BLD: 0 /100 WBCS — SIGNIFICANT CHANGE UP (ref 0–0)
NRBC # BLD: 0 /100 WBCS — SIGNIFICANT CHANGE UP (ref 0–0)
NT-PROBNP SERPL-SCNC: 2153 PG/ML — HIGH (ref 0–300)
PLATELET # BLD AUTO: 150 K/UL — SIGNIFICANT CHANGE UP (ref 150–400)
PLATELET # BLD AUTO: 172 K/UL — SIGNIFICANT CHANGE UP (ref 150–400)
POTASSIUM SERPL-MCNC: 4.3 MMOL/L — SIGNIFICANT CHANGE UP (ref 3.5–5.3)
POTASSIUM SERPL-MCNC: 4.3 MMOL/L — SIGNIFICANT CHANGE UP (ref 3.5–5.3)
POTASSIUM SERPL-SCNC: 4.3 MMOL/L — SIGNIFICANT CHANGE UP (ref 3.5–5.3)
POTASSIUM SERPL-SCNC: 4.3 MMOL/L — SIGNIFICANT CHANGE UP (ref 3.5–5.3)
PROT SERPL-MCNC: 7.5 G/DL — SIGNIFICANT CHANGE UP (ref 6–8.3)
RBC # BLD: 3.31 M/UL — LOW (ref 4.2–5.8)
RBC # BLD: 3.56 M/UL — LOW (ref 4.2–5.8)
RBC # FLD: 16.2 % — HIGH (ref 10.3–14.5)
RBC # FLD: 16.2 % — HIGH (ref 10.3–14.5)
SARS-COV-2 IGG+IGM SERPL QL IA: 173 U/ML — HIGH
SARS-COV-2 IGG+IGM SERPL QL IA: POSITIVE
SODIUM SERPL-SCNC: 137 MMOL/L — SIGNIFICANT CHANGE UP (ref 135–145)
SODIUM SERPL-SCNC: 139 MMOL/L — SIGNIFICANT CHANGE UP (ref 135–145)
VIT D25+D1,25 OH+D1,25 PNL SERPL-MCNC: 36.3 PG/ML — SIGNIFICANT CHANGE UP (ref 19.9–79.3)
WBC # BLD: 6.13 K/UL — SIGNIFICANT CHANGE UP (ref 3.8–10.5)
WBC # BLD: 6.56 K/UL — SIGNIFICANT CHANGE UP (ref 3.8–10.5)
WBC # FLD AUTO: 6.13 K/UL — SIGNIFICANT CHANGE UP (ref 3.8–10.5)
WBC # FLD AUTO: 6.56 K/UL — SIGNIFICANT CHANGE UP (ref 3.8–10.5)

## 2021-08-06 PROCEDURE — 71046 X-RAY EXAM CHEST 2 VIEWS: CPT | Mod: 26

## 2021-08-06 PROCEDURE — 99232 SBSQ HOSP IP/OBS MODERATE 35: CPT

## 2021-08-06 PROCEDURE — 99233 SBSQ HOSP IP/OBS HIGH 50: CPT | Mod: GC

## 2021-08-06 PROCEDURE — 93010 ELECTROCARDIOGRAM REPORT: CPT

## 2021-08-06 PROCEDURE — 71250 CT THORAX DX C-: CPT | Mod: 26

## 2021-08-06 RX ORDER — FUROSEMIDE 40 MG
80 TABLET ORAL ONCE
Refills: 0 | Status: DISCONTINUED | OUTPATIENT
Start: 2021-08-06 | End: 2021-08-06

## 2021-08-06 RX ORDER — INSULIN DETEMIR 100/ML (3)
36 INSULIN PEN (ML) SUBCUTANEOUS
Qty: 0 | Refills: 0 | DISCHARGE

## 2021-08-06 RX ORDER — METOPROLOL TARTRATE 50 MG
12.5 TABLET ORAL DAILY
Refills: 0 | Status: DISCONTINUED | OUTPATIENT
Start: 2021-08-06 | End: 2021-08-07

## 2021-08-06 RX ORDER — APIXABAN 2.5 MG/1
1 TABLET, FILM COATED ORAL
Qty: 0 | Refills: 0 | DISCHARGE

## 2021-08-06 RX ORDER — INSULIN DETEMIR 100/ML (3)
32 INSULIN PEN (ML) SUBCUTANEOUS
Qty: 0 | Refills: 0 | DISCHARGE

## 2021-08-06 RX ORDER — ACETAMINOPHEN 500 MG
2 TABLET ORAL
Qty: 0 | Refills: 0 | DISCHARGE
Start: 2021-08-06

## 2021-08-06 RX ADMIN — CARVEDILOL PHOSPHATE 6.25 MILLIGRAM(S): 80 CAPSULE, EXTENDED RELEASE ORAL at 11:04

## 2021-08-06 RX ADMIN — INSULIN GLARGINE 36 UNIT(S): 100 INJECTION, SOLUTION SUBCUTANEOUS at 22:31

## 2021-08-06 RX ADMIN — ATORVASTATIN CALCIUM 80 MILLIGRAM(S): 80 TABLET, FILM COATED ORAL at 21:31

## 2021-08-06 RX ADMIN — Medication 650 MILLIGRAM(S): at 19:26

## 2021-08-06 RX ADMIN — Medication 1: at 22:30

## 2021-08-06 RX ADMIN — OXYCODONE AND ACETAMINOPHEN 1 TABLET(S): 5; 325 TABLET ORAL at 11:05

## 2021-08-06 RX ADMIN — Medication 2: at 12:37

## 2021-08-06 RX ADMIN — Medication 12.5 MILLIGRAM(S): at 21:31

## 2021-08-06 RX ADMIN — Medication 150 MICROGRAM(S): at 06:13

## 2021-08-06 RX ADMIN — Medication 650 MILLIGRAM(S): at 18:30

## 2021-08-06 RX ADMIN — CLOPIDOGREL BISULFATE 75 MILLIGRAM(S): 75 TABLET, FILM COATED ORAL at 11:04

## 2021-08-06 RX ADMIN — Medication 1: at 06:46

## 2021-08-06 RX ADMIN — AMLODIPINE BESYLATE 5 MILLIGRAM(S): 2.5 TABLET ORAL at 06:13

## 2021-08-06 RX ADMIN — INSULIN GLARGINE 32 UNIT(S): 100 INJECTION, SOLUTION SUBCUTANEOUS at 07:32

## 2021-08-06 RX ADMIN — Medication 1: at 18:00

## 2021-08-06 RX ADMIN — VALSARTAN 160 MILLIGRAM(S): 80 TABLET ORAL at 11:04

## 2021-08-06 RX ADMIN — OXYCODONE AND ACETAMINOPHEN 1 TABLET(S): 5; 325 TABLET ORAL at 12:05

## 2021-08-06 RX ADMIN — Medication 80 MILLIGRAM(S): at 08:17

## 2021-08-06 RX ADMIN — Medication 100 MILLIGRAM(S): at 06:13

## 2021-08-06 NOTE — PROGRESS NOTE ADULT - ASSESSMENT
1. Pulmonary HTN  2. MAEGAN on CPAP  3. HFrEF    60 year old male presents for AICD placement with multiple ablations in the past. Recent cardiac MRI that showed non-ischemic scarring concerning for sarcoidosis. Because of these concerns pulmonary service was contacted for further workup. Did see pulmonary at [facility] but uncertain as to workup. Will obtain records from office. Will need CT of the chest for dedicated lung imaging. Will hold off on aggressive workup until more records can be obtained as we do not want to put him through double workup. Will see what records come and make further decisions based off that. If nothing else has been done would benefit from full pulm HTN work up with v/q, sleep study, ACE level, vitamin D. He has known hx of sleep apnea and is on CPAP at home so that gives him some level of group 3 as well as group 2 with known CHF history and volume overload. Connective tissue workup may be needed as well to evaluate cause of type 1 but will await for records as this workup is extensive.     Recommendations:    - CT of the chest for dedicated lung imagining and evaluate for pulmonary sarcoidosis. Plan to be done today.  - Review records from [facility] and pursue further work up. Contacted awaiting fax of records  - ACE and Vitamin D level for further sarcoidosis evaluation, pending  - If no work has bee done for Pulm HTN then will need v/q, connective tissue eval, as well as Right heart cath. 1. Pulmonary HTN  2. MAEGAN on CPAP  3. HFrEF    60 year old male presents for AICD placement with multiple ablations in the past. Recent cardiac MRI that showed non-ischemic scarring concerning for sarcoidosis. Because of these concerns pulmonary service was contacted for further workup. Did see pulmonary at [facility] but uncertain as to workup. Will obtain records from office. Will need CT of the chest for dedicated lung imaging. Will hold off on aggressive workup until more records can be obtained as we do not want to put him through double workup. Will see what records come and make further decisions based off that. If nothing else has been done would benefit from full pulm HTN work up with v/q, sleep study, ACE level, vitamin D. He has known hx of sleep apnea and is on CPAP at home so that gives him some level of group 3 as well as group 2 with known CHF history and volume overload. Connective tissue workup may be needed as well to evaluate cause of type 1 but will await for records as this workup is extensive.     Recommendations:    - CT of the chest for dedicated lung imagining and evaluate for pulmonary sarcoidosis. Plan to be done today.  - Review records from [facility] and pursue further work up. Contacted awaiting fax of records  - ACE and Vitamin D level for further sarcoidosis evaluation, pending  - If no work has bee done for Pulm HTN then will need v/q, connective tissue eval, as well as Right heart cath.   - Does not need to stay inpatient for this workup can follow up outpatient for further completion of workup 1. Pulmonary HTN  2. MAEGAN on CPAP  3. HFrEF    60 year old male presents for AICD placement with multiple ablations in the past. Recent cardiac MRI that showed non-ischemic scarring concerning for sarcoidosis. Because of these concerns pulmonary service was contacted for further workup. Did see pulmonary at [facility] but uncertain as to workup. Will obtain records from office. Will need CT of the chest for dedicated lung imaging. Will hold off on aggressive workup until more records can be obtained as we do not want to put him through double workup. Will see what records come and make further decisions based off that. If nothing else has been done would benefit from full pulm HTN work up with v/q, sleep study, ACE level, vitamin D. He has known hx of sleep apnea and is on CPAP at home so that gives him some level of group 3 as well as group 2 with known CHF history and volume overload. Connective tissue workup may be needed as well to evaluate cause of type 1 but will await for records as this workup is extensive.     Recommendations:    - CT of the chest for dedicated lung imagining and evaluate for pulmonary sarcoidosis, with some nodules present may be sarcoidosis but uncertain.  - Review records from [facility] and relatively unremarkable had no workup for either the sarcoid or PH  - ACE and Vitamin D level for further sarcoidosis evaluation, pending  - Work up for Pulm HTN then will need v/q, connective tissue eval, as well as Right heart cath as well as possible bronch for sarcoid  - Does not need to stay inpatient for this workup can follow up outpatient for further completion of workup  - Patient would like to follow up with pulmonologist on [place], Dr. Johnson at discharge as this is closer to his home 1. Pulmonary HTN  2. MAEGAN on CPAP  3. HFrEF    60 year old male presents for AICD placement with multiple ablations in the past. Recent cardiac MRI that showed non-ischemic scarring concerning for sarcoidosis. Because of these concerns pulmonary service was contacted for further workup. Did see pulmonary at [facility] but uncertain as to workup. Will obtain records from office. Will need CT of the chest for dedicated lung imaging. Will hold off on aggressive workup until more records can be obtained as we do not want to put him through double workup. Will see what records come and make further decisions based off that. If nothing else has been done would benefit from full pulm HTN work up with v/q, sleep study, ACE level, vitamin D. He has known hx of sleep apnea and is on CPAP at home so that gives him some level of group 3 as well as group 2 with known CHF history and volume overload. Connective tissue workup may be needed as well to evaluate cause of type 1 but will await for records as this workup is extensive.     Recommendations:    - CT of the chest for dedicated lung imagining and evaluate for pulmonary sarcoidosis, Do not feel that chest CT was consistent with pulmonary sarcoidosis.  - If there is concern for active cardiac sarcoidosis would advise cardiac PET  - Review records from [facility] and relatively unremarkable had no workup for either the sarcoid or PH  - ACE and Vitamin D level for further sarcoidosis evaluation, pending  - Work up for Pulm HTN then will need v/q, connective tissue eval, as well as Right heart cath  - Does not need to stay inpatient for this workup can follow up outpatient for further completion of workup  - Patient would like to follow up with pulmonologist on [place], Dr. Johnson at discharge as this is closer to his home

## 2021-08-06 NOTE — PROGRESS NOTE ADULT - ATTENDING COMMENTS
Extensive cardiac history as above with concerns for sarcoidosis and resultant pulmonary hypertension. Recent TTE with severe TR so do not know the extent of PH (if present) given the underestimation that would occur with severe TR. Does not have pulmonary symptoms c/w sarcoidosis; CT chest imaging with multiple nodules, all less than 6mm and atypical for sarcoidosis. If cardiac sarcoid is present AICD was already placed for primary prevention. Records from [facility] not helpful. If confirmed to have PH, he would have multifactorial PH since he already has known COPD, MAEGAN, and Heart disease (group III and II, respectively). Should get a RHC to evaluate right sided pressures for definitive diagnosis.  Lives on [place]; should follow up with Dr. Luis Rivera, [facility] (pul).

## 2021-08-06 NOTE — DISCHARGE NOTE PROVIDER - CARE PROVIDER_API CALL
Bony Garrett)  Cardiac Electrophysiology  [address], [address] [place] [address]  Phone: [phone]  Fax: [phone]  Follow Up Time:    Bony Garrett)  Cardiac Electrophysiology  [address], [address] [place] [address]  Phone: [phone]  Fax: [phone]  Follow Up Time:     Brandi Negrete)  Adult Congenital Heart Disease; Cardiovascular Disease; Internal Medicine  [address], [address]  [place] [address]  Phone: [phone]  Fax: [phone]  Follow Up Time: 1 week   Bony Garrett)  Cardiac Electrophysiology  [address], [address] [place] [address]  Phone: [phone]  Fax: [phone]  Follow Up Time:     Brandi Negrete)  Adult Congenital Heart Disease; Cardiovascular Disease; Internal Medicine  [address], [address]  [place] [address]  Phone: [phone]  Fax: [phone]  Follow Up Time: 1 week    HEAVEN GALAVIZ  Internal Medicine  [address] [place] [address]  Phone: [phone]  Fax: [phone]  Follow Up Time: 1-3 days

## 2021-08-06 NOTE — PROGRESS NOTE ADULT - SUBJECTIVE AND OBJECTIVE BOX
EPS Progress Note  CC: For device implant    S: Seen and examined earlier today. At first felt very well.  Toward the afternoon, pt c/o dizziness when he stood up. This happened shortly after taking coreg.  SBP went down to 90.  Similar episode occurred a couple of hours later and SBP 70  (again with standing up).    Still has some pain at ICD wound site and still has some swelling there. Pressure dressing was removed this morning by me.   States that he was urinating a lot last night after the Lasix IV 80 mg.  However, not as much urine output today compared to last night.     O: T(C): 37 (08-06-21 @ 13:40), Max: 37 (08-06-21 @ 13:40)  HR: 80 (08-06-21 @ 16:15) (79 - 81)  BP: 114/56 (08-06-21 @ 16:15) (78/51 - 157/67)  RR: 16 (08-06-21 @ 16:11) (16 - 18)  SpO2: 96% (08-06-21 @ 16:11) (92% - 98%)    TELE:  AP - VS with intermittent .  HR 80 -100s.     PHYSICAL  Constitutional:  NAD        Neck: No JVD  Pulm:  mild rales at bases. No wheeze  Cardiac:   + s1/s2, RRR, ICD left side with moderate hematoma.   GI:  +BS , soft ND/NT  Vascular: No LE edema, pulse 2+  Neuro: AAO x 3. no focal deficit  Skin: Warm. No rash or lesion     LABS:                        9.0    6.13  )-----------( 172      ( 06 Aug 2021 15:12 )             30.1     08-06    139  |  98  |  48<H>  ----------------------------<  171<H>  4.3   |  31  |  1.89<H>    Ca    10.0      06 Aug 2021 15:12  Mg     2.2     08-06    TPro  7.5  /  Alb  3.8  /  TBili  0.5  /  DBili  x   /  AST  16  /  ALT  11  /  AlkPhos  99  08-06        MEDICATIONS:  acetaminophen   Tablet .. 650 milliGRAM(s) Oral every 6 hours PRN  atorvastatin 80 milliGRAM(s) Oral at bedtime  clopidogrel Tablet 75 milliGRAM(s) Oral daily  dextrose 40% Gel 15 Gram(s) Oral Once  dextrose 5%. 1000 milliLiter(s) IV Continuous <Continuous>  dextrose 50% Injectable 25 Gram(s) IV Push Once  glucagon  Injectable 1 milliGRAM(s) IntraMuscular Once  insulin glargine Injectable (LANTUS) 32 Unit(s) SubCutaneous every morning  insulin glargine Injectable (LANTUS) 36 Unit(s) SubCutaneous at bedtime  insulin lispro (ADMELOG) corrective regimen sliding scale   SubCutaneous Before meals and at bedtime  levothyroxine 150 MICROGram(s) Oral daily  metoprolol succinate ER 12.5 milliGRAM(s) Oral daily  valsartan 160 milliGRAM(s) Oral daily            
PULMONARY CONSULT SERVICE FOLLOW-UP NOTE    INTERVAL HPI:  Reviewed chart and overnight events; patient seen and examined at bedside. This morning patient resting in bed with no complaints. States that site of AICD placement is improving from a pain stand point. No respiratory complaints still.    MEDICATIONS:  Pulmonary:    Antimicrobials:    Anticoagulants:  clopidogrel Tablet 75 milliGRAM(s) Oral daily    Cardiac:  amLODIPine   Tablet 5 milliGRAM(s) Oral daily  carvedilol 6.25 milliGRAM(s) Oral every 12 hours  valsartan 160 milliGRAM(s) Oral daily      Allergies    lisinopril (Unknown)  penicillin (Unknown)    Intolerances        Vital Signs Last 24 Hrs  T(C): 36.2 (06 Aug 2021 06:02), Max: 36.7 (05 Aug 2021 18:46)  T(F): 97.2 (06 Aug 2021 06:02), Max: 98.1 (05 Aug 2021 18:46)  HR: 79 (06 Aug 2021 05:17) (79 - 81)  BP: 98/66 (06 Aug 2021 05:17) (98/66 - 166/79)  BP(mean): --  RR: 16 (06 Aug 2021 05:17) (16 - 18)  SpO2: 92% (06 Aug 2021 05:17) (92% - 98%)    08-05 @ 07:01  -  08-06 @ 07:00  --------------------------------------------------------  IN: 280 mL / OUT: 2750 mL / NET: -2470 mL          PHYSICAL EXAM:  Constitutional: WDWN  HEENT: NC/AT; PERRL, anicteric sclera; MMM  Neck: supple  Cardiovascular: +S1/S2, RRR  Respiratory: CTA B/L; no W/R/R  Gastrointestinal: soft, NT/ND  Extremities: WWP; no edema, clubbing or cyanosis  Vascular: 2+ radial pulses B/L  Neurological: awake and alert; GAXIOLA    LABS:      CBC Full  -  ( 06 Aug 2021 07:22 )  WBC Count : 6.56 K/uL  RBC Count : 3.31 M/uL  Hemoglobin : 8.4 g/dL  Hematocrit : 28.2 %  Platelet Count - Automated : 150 K/uL  Mean Cell Volume : 85.2 fl  Mean Cell Hemoglobin : 25.4 pg  Mean Cell Hemoglobin Concentration : 29.8 gm/dL  Auto Neutrophil # : x  Auto Lymphocyte # : x  Auto Monocyte # : x  Auto Eosinophil # : x  Auto Basophil # : x  Auto Neutrophil % : x  Auto Lymphocyte % : x  Auto Monocyte % : x  Auto Eosinophil % : x  Auto Basophil % : x    08-06    137  |  99  |  41<H>  ----------------------------<  171<H>  4.3   |  29  |  1.73<H>    Ca    9.7      06 Aug 2021 07:22    TPro  7.5  /  Alb  3.8  /  TBili  0.5  /  DBili  x   /  AST  16  /  ALT  11  /  AlkPhos  99  08-06                      RADIOLOGY & ADDITIONAL STUDIES:

## 2021-08-06 NOTE — DISCHARGE NOTE PROVIDER - NSDCFUADDINST_GEN_ALL_CORE_FT
- You underwent ICD implant on 8/5/21.  The company that makes your device is called [employer].   - Follow up with Dr Travon Parmar 8/12 at 11:50 AM (Dr. Garrett is going on vacation until september).   - See your cardiologist Dr. Negrete to help with your heart failure.   - Follow up with pulmonologist for workup on pulmonary hypertension / possible sarcoid.   - Wound care instruction: Do not lift left arm above shoulder or lift heavy items with left arm for 1 month. You can shower 2 days later.  Keep incision site clean and dry. Do not remove the glue on the incision. Let it fall off on its own.   Call our doctor  if any questions about the wound -- such as bleeding, swelling, increasing pain or redness.  [phone].   - You underwent ICD implant on 8/5/21.  The company that makes your device is called [employer].   - You have a hematoma at the ICD insertion area.  As such, stop Eliquis for now.  You can also apply ice bag to the hematoma area to help with pain relief.   - Follow up with Dr Travon Parmar 8/12 at 11:50 AM (Dr. Garrett is going on vacation until september).   - See your cardiologist Dr. Negrete to help with your heart failure.   - Follow up with pulmonologist for workup on pulmonary hypertension / possible sarcoid.   - Wound care instruction: Do not lift left arm above shoulder or lift heavy items with left arm for 1 month. You can shower 2 days later.  Keep incision site clean and dry. Do not remove the glue on the incision. Let it fall off on its own.   Call our doctor  if any questions about the wound -- such as bleeding, swelling, increasing pain or redness.  [phone].

## 2021-08-06 NOTE — DISCHARGE NOTE PROVIDER - PROVIDER TOKENS
PROVIDER:[TOKEN:[[id number]]] PROVIDER:[TOKEN:[[id number]]],PROVIDER:[TOKEN:[[id number]],FOLLOWUP:[1 week]] PROVIDER:[TOKEN:[[id number]]],PROVIDER:[TOKEN:[[id number]],FOLLOWUP:[1 week]],PROVIDER:[TOKEN:[[id number]],FOLLOWUP:[1-3 days]]

## 2021-08-06 NOTE — DISCHARGE NOTE PROVIDER - NSDCMRMEDTOKEN_GEN_ALL_CORE_FT
amlodipine-valsartan 5 mg-160 mg oral tablet: 1 tab(s) orally once a day  clopidogrel 75 mg oral tablet: 1 tab(s) orally once a day  eplerenone 25 mg oral tablet: 1 tab(s) orally once a day  furosemide 40 mg oral tablet: 1 tab(s) orally once a day  Januvia 100 mg oral tablet: 1 tab(s) orally once a day  Levemir 100 units/mL subcutaneous solution: 32 unit(s) subcutaneous once a day (in the morning)  Levemir 100 units/mL subcutaneous solution: 36 unit(s) subcutaneous once a day (at bedtime)  levothyroxine 150 mcg (0.15 mg) oral tablet: 1 tab(s) orally once a day  Lipitor 80 mg oral tablet: 1 tab(s) orally once a day  metFORMIN 500 mg oral tablet, extended release: 1 tab(s) orally 2 times a day  NovoLOG 100 units/mL subcutaneous solution: 5 unit(s) subcutaneous 3 times a day  tadalafil 5 mg oral tablet: 1 tab(s) orally once a day   acetaminophen 325 mg oral tablet: 2 tab(s) orally every 6 hours, As needed, Moderate Pain (4 - 6)  amlodipine-valsartan 5 mg-160 mg oral tablet: 1 tab(s) orally once a day  clopidogrel 75 mg oral tablet: 1 tab(s) orally once a day  eplerenone 25 mg oral tablet: 1 tab(s) orally once a day  furosemide 40 mg oral tablet: 1 tab(s) orally once a day  Januvia 100 mg oral tablet: 1 tab(s) orally once a day  levothyroxine 150 mcg (0.15 mg) oral tablet: 1 tab(s) orally once a day  Lipitor 80 mg oral tablet: 1 tab(s) orally once a day  metFORMIN 500 mg oral tablet, extended release: 1 tab(s) orally 2 times a day  NovoLOG 100 units/mL subcutaneous solution: 5 unit(s) subcutaneous 3 times a day  tadalafil 5 mg oral tablet: 1 tab(s) orally once a day   acetaminophen 325 mg oral tablet: 2 tab(s) orally every 6 hours, As needed, Moderate Pain (4 - 6)  amlodipine-valsartan 5 mg-160 mg oral tablet: 1 tab(s) orally once a day  clopidogrel 75 mg oral tablet: 1 tab(s) orally once a day  eplerenone 25 mg oral tablet: 1 tab(s) orally once a day  furosemide 40 mg oral tablet: 1 tab(s) orally once a day  Januvia 100 mg oral tablet: 1 tab(s) orally once a day  levothyroxine 150 mcg (0.15 mg) oral tablet: 1 tab(s) orally once a day  Lipitor 80 mg oral tablet: 1 tab(s) orally once a day  metFORMIN 500 mg oral tablet, extended release: 1 tab(s) orally 2 times a day  metoprolol succinate 25 mg oral tablet, extended release: 1 tab(s) orally once a day (at bedtime)   NovoLOG 100 units/mL subcutaneous solution: 5 unit(s) subcutaneous 3 times a day  tadalafil 5 mg oral tablet: 1 tab(s) orally once a day   acetaminophen 325 mg oral tablet: 2 tab(s) orally every 6 hours, As needed, Moderate Pain (4 - 6)  amlodipine-valsartan 5 mg-160 mg oral tablet: 1 tab(s) orally once a day  apixaban 5 mg oral tablet: 1 tab(s) orally 2 times a day, HOLD, RESTART ON TUESDAY   clopidogrel 75 mg oral tablet: 1 tab(s) orally once a day  eplerenone 25 mg oral tablet: 1 tab(s) orally once a day  furosemide 40 mg oral tablet: 1 tab(s) orally once a day  Januvia 100 mg oral tablet: 1 tab(s) orally once a day  levothyroxine 150 mcg (0.15 mg) oral tablet: 1 tab(s) orally once a day  Lipitor 80 mg oral tablet: 1 tab(s) orally once a day  metFORMIN 500 mg oral tablet, extended release: 1 tab(s) orally 2 times a day  metoprolol succinate 25 mg oral tablet, extended release: 1 tab(s) orally once a day (at bedtime)   NovoLOG 100 units/mL subcutaneous solution: 5 unit(s) subcutaneous 3 times a day  tadalafil 5 mg oral tablet: 1 tab(s) orally once a day   acetaminophen 325 mg oral tablet: 2 tab(s) orally every 6 hours, As needed, Moderate Pain (4 - 6)  amlodipine-valsartan 5 mg-160 mg oral tablet: 1 tab(s) orally once a day  apixaban 5 mg oral tablet: 1 tab(s) orally 2 times a day, HOLD, RESTART ON TUESDAY   clopidogrel 75 mg oral tablet: 1 tab(s) orally once a day  eplerenone 25 mg oral tablet: 1 tab(s) orally once a day  furosemide 40 mg oral tablet: 1 tab(s) orally once a day  insulin detemir 100 units/mL subcutaneous solution: 32 unit(s) subcutaneous once a day in the morning and 36 units in the evening   Januvia 100 mg oral tablet: 1 tab(s) orally once a day  levothyroxine 150 mcg (0.15 mg) oral tablet: 1 tab(s) orally once a day  Lipitor 80 mg oral tablet: 1 tab(s) orally once a day  metFORMIN 500 mg oral tablet, extended release: 1 tab(s) orally 2 times a day  metoprolol succinate 25 mg oral tablet, extended release: 1 tab(s) orally once a day (at bedtime)   NovoLOG 100 units/mL subcutaneous solution: 5 unit(s) subcutaneous 3 times a day  tadalafil 5 mg oral tablet: 1 tab(s) orally once a day   acetaminophen 325 mg oral tablet: 2 tab(s) orally every 6 hours, As needed, Moderate Pain (4 - 6)  amlodipine-valsartan 5 mg-160 mg oral tablet: HOLD THIS MEDICATION UNTIL YOU SEE YOUR PRIMARY CARE PROVIDER   1 tab(s) orally once a day  apixaban 5 mg oral tablet: 1 tab(s) orally 2 times a day, HOLD, RESTART ON TUESDAY   clopidogrel 75 mg oral tablet: 1 tab(s) orally once a day  eplerenone 25 mg oral tablet: 1 tab(s) orally once a day  furosemide 40 mg oral tablet: 1 tab(s) orally once a day  insulin detemir 100 units/mL subcutaneous solution: 32 unit(s) subcutaneous once a day in the morning and 36 units in the evening   Januvia 100 mg oral tablet: 1 tab(s) orally once a day  levothyroxine 150 mcg (0.15 mg) oral tablet: 1 tab(s) orally once a day  Lipitor 80 mg oral tablet: 1 tab(s) orally once a day  metFORMIN 500 mg oral tablet, extended release: 1 tab(s) orally 2 times a day  metoprolol succinate 25 mg oral tablet, extended release: 1 tab(s) orally once a day (at bedtime)   NovoLOG 100 units/mL subcutaneous solution: 5 unit(s) subcutaneous 3 times a day  tadalafil 5 mg oral tablet: 1 tab(s) orally once a day

## 2021-08-06 NOTE — DISCHARGE NOTE PROVIDER - NSDCCPCAREPLAN_GEN_ALL_CORE_FT
PRINCIPAL DISCHARGE DIAGNOSIS  Diagnosis: Chronotropic incompetence with sinus node dysfunction  Assessment and Plan of Treatment: Chronotropic incompetence with sinus node dysfunction       PRINCIPAL DISCHARGE DIAGNOSIS  Diagnosis: Chronotropic incompetence with sinus node dysfunction  Assessment and Plan of Treatment: Your heart rhythm was irregular and required you to have a pacemaker placed. The pacemaker/defibrillator site is covered with glue.  This should fall off in 2 weeks, if not you can gently pull them off after 2 weeks’ time.  Call your Electrophysiologist if your wound is red, swollen or has drainage, if you have a swollen arm or fever.  You may shower the day after discharge.  Let soap and water run over the area, do not scrub.  Pat dry.  No baths or swimming for 1 month.  No strenuous exertion or driving a car for 1 month.  Do Not raise your Left arm above your Left shoulder for 1 month.        SECONDARY DISCHARGE DIAGNOSES  Diagnosis: Heart failure  Assessment and Plan of Treatment: -You have a history of weakened heart muscle called congestive heart failure.   -Please make sure you follow up with your cardiologist within one week of discharge.   -Please weigh yourself daily: if you have gained more than 2-3 lbs in one day or 5 lbs in one week contact your doctor immediately as you may be retaining water weight  -In addition, restrict your salt intake to less than 2 grams a day  -If you develop worsening shortening of breath, leg swelling, fatigue, chest pain, difficulty sleeping at night due to shortness of breath, contact your cardiologist immediately.   Please continue taking Lasix 40mg daily and follow up with Dr. Negrete    Diagnosis: LUPE (acute kidney injury)  Assessment and Plan of Treatment: Whole     PRINCIPAL DISCHARGE DIAGNOSIS  Diagnosis: Chronotropic incompetence with sinus node dysfunction  Assessment and Plan of Treatment: Your heart rhythm was irregular and required you to have a pacemaker placed. The pacemaker/defibrillator site is covered with glue.  This should fall off in 2 weeks, if not you can gently pull them off after 2 weeks’ time.  Call your Electrophysiologist if your wound is red, swollen or has drainage, if you have a swollen arm or fever.  You may shower the day after discharge.  Let soap and water run over the area, do not scrub.  Pat dry.  No baths or swimming for 1 month.  No strenuous exertion or driving a car for 1 month.  Do Not raise your Left arm above your Left shoulder for 1 month. Please continue taking Metoprolol succinate 25mg AT BED TIME         SECONDARY DISCHARGE DIAGNOSES  Diagnosis: Heart failure  Assessment and Plan of Treatment: -You have a history of weakened heart muscle called congestive heart failure.   -Please make sure you follow up with your cardiologist within one week of discharge.   -Please weigh yourself daily: if you have gained more than 2-3 lbs in one day or 5 lbs in one week contact your doctor immediately as you may be retaining water weight  -In addition, restrict your salt intake to less than 2 grams a day  -If you develop worsening shortening of breath, leg swelling, fatigue, chest pain, difficulty sleeping at night due to shortness of breath, contact your cardiologist immediately.   Please continue taking Lasix 40mg daily and follow up with Dr. Negrete    Diagnosis: LUPE (acute kidney injury)  Assessment and Plan of Treatment: While you were in the hospital your kidney function was decreased the serum Cr was 1.7 please follow up with your primary care provider to have your kidney function checked!    Diagnosis: Hypertension  Assessment and Plan of Treatment:      PRINCIPAL DISCHARGE DIAGNOSIS  Diagnosis: Chronotropic incompetence with sinus node dysfunction  Assessment and Plan of Treatment: Your heart rhythm was irregular and required you to have a pacemaker placed. The pacemaker/defibrillator site is covered with glue.  This should fall off in 2 weeks, if not you can gently pull them off after 2 weeks’ time.  Call your Electrophysiologist if your wound is red, swollen or has drainage, if you have a swollen arm or fever.  You may shower the day after discharge.  Let soap and water run over the area, do not scrub.  Pat dry.  No baths or swimming for 1 month.  No strenuous exertion or driving a car for 1 month.  Do Not raise your Left arm above your Left shoulder for 1 month. Please continue taking Metoprolol succinate 25mg AT BED TIME         SECONDARY DISCHARGE DIAGNOSES  Diagnosis: Heart failure  Assessment and Plan of Treatment: -You have a history of weakened heart muscle called congestive heart failure.   -Please make sure you follow up with your cardiologist within one week of discharge.   -Please weigh yourself daily: if you have gained more than 2-3 lbs in one day or 5 lbs in one week contact your doctor immediately as you may be retaining water weight  -In addition, restrict your salt intake to less than 2 grams a day  -If you develop worsening shortening of breath, leg swelling, fatigue, chest pain, difficulty sleeping at night due to shortness of breath, contact your cardiologist immediately.   Please continue taking Lasix 40mg daily and follow up with Dr. Sesay    Diagnosis: LUPE (acute kidney injury)  Assessment and Plan of Treatment: While you were in the hospital your kidney function was decreased the serum Cr was 1.7 please follow up with your primary care provider to have your kidney function checked!    Diagnosis: Hypertension  Assessment and Plan of Treatment: Hypertension, commonly called high blood pressure, is when the force of blood pumping through your arteries is too strong. Hypertension forces your heart to work harder to pump blood. Your arteries may become narrow or stiff. Having untreated or uncontrolled hypertension for a long period of time can cause heart attack, stroke, kidney disease, and other problems.   Please continue to taking Valsartan/amlodipine  follow up with Dr. sesay in 1 week     PRINCIPAL DISCHARGE DIAGNOSIS  Diagnosis: Chronotropic incompetence with sinus node dysfunction  Assessment and Plan of Treatment: Your heart rhythm was irregular and required you to have a pacemaker placed. The pacemaker/defibrillator site is covered with glue.  This should fall off in 2 weeks, if not you can gently pull them off after 2 weeks’ time.  Call your Electrophysiologist if your wound is red, swollen or has drainage, if you have a swollen arm or fever.  You may shower the day after discharge.  Let soap and water run over the area, do not scrub.  Pat dry.  No baths or swimming for 1 month.  No strenuous exertion or driving a car for 1 month.  Do Not raise your Left arm above your Left shoulder for 1 month. Please continue taking Metoprolol succinate 25mg AT BED TIME         SECONDARY DISCHARGE DIAGNOSES  Diagnosis: Heart failure  Assessment and Plan of Treatment: -You have a history of weakened heart muscle called congestive heart failure.   -Please make sure you follow up with your cardiologist within one week of discharge.   -Please weigh yourself daily: if you have gained more than 2-3 lbs in one day or 5 lbs in one week contact your doctor immediately as you may be retaining water weight  -In addition, restrict your salt intake to less than 2 grams a day  -If you develop worsening shortening of breath, leg swelling, fatigue, chest pain, difficulty sleeping at night due to shortness of breath, contact your cardiologist immediately.   Please continue taking Lasix 40mg daily and follow up with Dr. Sesay    Diagnosis: LUPE (acute kidney injury)  Assessment and Plan of Treatment: While you were in the hospital your kidney function was decreased the serum Cr was 1.7 please follow up with your primary care provider to have your kidney function checked!    Diagnosis: Hypertension  Assessment and Plan of Treatment: Hypertension, commonly called high blood pressure, is when the force of blood pumping through your arteries is too strong. Hypertension forces your heart to work harder to pump blood. Your arteries may become narrow or stiff. Having untreated or uncontrolled hypertension for a long period of time can cause heart attack, stroke, kidney disease, and other problems.   PLEASE HOLD VALSARTAN/AMLODIPINE UNTIL YOU SEE YOUR PRIMARY CARE PROVIDER  follow up with Dr. sesay in 1 week

## 2021-08-06 NOTE — PROGRESS NOTE ADULT - ASSESSMENT
60 y.o. M with pmhx significant for diabetes, CKD stage II, history of GRAVES disease s/p radioactive iodine therapy with subsequent hypothyroidism, CAD s/p stents, most recently 4/2021 DARYA to ramus, and CABG, mitral valve repair, KOFI repair and intra-op MAZE ablation 2003, sleep apnea on CPAP (poor compliance), COPD, PAD s/p leg stent and carotid endarterectomy, AF s/p EP study with extensive scar (durable PVI and posterior wall isolation) and no inducible Afib, chronotropic incompetence, and cardiac MRI (8/4/21) that showed patchy endocardia / epicardial scar suspicious fo sarcoid.  Given bradycardia and myocardial scar on cMRI, he underwent dual chamber ICD (medtronic) on 8/5/21.  He was started on Lasix IV 80 mg bid for HF.    1.  Post ICD ---- Device interrogation today with good numbers.  CXR w/o ptx.  Device site's pressure dressing was removed. Has moderate size hematoma. --- HOLD off Eliquis for now (do not restart upon discharge -- he is in sinus, will continue monitor atrial arrhtyhmia burden via device)   2. Heart failure symptoms -- right side HF.  Got diuresed last night and this morning.  May be overdiuresed as pt is having orthostasis and lab showing elevated BUN/Cr.  He is not SOB today. Hold off further IV lasix.  If lab stable tomorrow, plan to d/c home with lasix 40 mg BID (on daily at home).  HF team Dr. Castañeda spoke with attending; recommended toprol.  Will try low dose Toprol 12.5 mg daily at night.    3. Possible pulm sarcoid workup --- Pt got CT chest per pulm consult. Pt will continue to f/u with pulm outpatient.   4. H/O CAD / stent in April 2021--- CP free. Continue Plavix.    Dispo:  Keep one more day today. If stable tomorrow, home.  Pt was given f/u appt with DR. Travon Parmar next week (dr. gary will be away till september).

## 2021-08-06 NOTE — DISCHARGE NOTE PROVIDER - NSDCADMDATE_GEN_ALL_CORE_FT
Cardiology Cardiology Cardiology Cardiology Cardiology Cardiology Cardiology Cardiology Cardiology Cardiology Cardiology Cardiology Cardiology Cardiology Cardiology Cardiology Cardiology Cardiology Cardiology Cardiology Critical Care Critical Care Critical Care Critical Care Critical Care Critical Care Critical Care Critical Care Critical Care Critical Care Critical Care Critical Care Critical Care Gastroenterology Gastroenterology Gastroenterology Gastroenterology Gastroenterology Gastroenterology Gastroenterology Gastroenterology Hospitalist Hospitalist Hospitalist Hospitalist Hospitalist Hospitalist Hospitalist Hospitalist Hospitalist Hospitalist Hospitalist Hospitalist Hospitalist Hospitalist Hospitalist Infectious Disease Infectious Disease Infectious Disease Infectious Disease Infectious Disease Palliative Care Palliative Care Palliative Care Palliative Care Pulmonology Pulmonology Pulmonology Pulmonology Pulmonology Pulmonology Pulmonology Pulmonology Pulmonology Pulmonology Pulmonology Pulmonology Pulmonology Pulmonology Pulmonology Pulmonology Pulmonology Pulmonology Pulmonology Pulmonology Pulmonology Surgery Urology Urology Urology Critical Care Critical Care Critical Care Palliative Care Palliative Care Pulmonology Pulmonology Pulmonology Pulmonology Pulmonology Critical Care Urology Gastroenterology Hospitalist Gastroenterology Hospitalist Hospitalist Hospitalist 05-Aug-2021 10:57 Hospitalist Hospitalist Hospitalist Hospitalist

## 2021-08-06 NOTE — DISCHARGE NOTE PROVIDER - HOSPITAL COURSE
60 y.o. M with pmhx significant for diabetes, CKD stage II, history of GRAVES disease s/p radioactive iodine therapy with subsequent hypothyroidism, CAD s/p stents, most recently 4/2021 DARYA to ramus, and CABG, mitral valve repair, KOFI repair and intra-op MAZE ablation 2003, sleep apnea on CPAP (poor compliance), COPD, PAD s/p leg stent and carotid endarterectomy, AF s/p EP study with extensive scar (durable PVI and posterior wall isolation) and no inducible Afib, chronotropic incompetence and cMRI on 8/4/21 that showed moderate0severe MR and TR with patchy endocardial and epicardial scar, suggestive of sarcoid, with LVEF 40% now s/p dual chamber ICD (Medtronic [place]) implant on 8/5/21.   Pt had a moderate size pocket hematoma post procedure. Pressure dressing was removed today. Hematoma is to the medial size of the device.  CXR w/o ptx.    Device interrogation showed normal function and numbers. He is set at AAIR-DDDR  bpm.     RA sense: 0.6 mv.  Threshold 0.5 V at 0.4 ms.  Impedance 361 ohms  RV sense: 7.3 mv. Threshold 0.5 V at 0.4 ms. Impedance 380 ohms.  Shock coil impedance: 46 ohms     AP 97.7% and  5.2% so far.     He was given Lasix IV 80 mg last night and today.  Pt felt dizzy this afternoon, shortly after getting his Coreg. He reports similar symptom in the past when he got coreg.  SBP 90s mmhg.  He is feeling better now.  Tele with [facility] -VS with intermittent .      Pulm team consulted on pt. CT chest obtained. Pt to f/u pulm outpatient (for workup for pulm htn and sarcoid).   Pt is aware to f/u with his cardiologist Dr. Negrete for continue cardiac care. 60 y.o. M with pmhx significant for diabetes, CKD stage II, history of GRAVES disease s/p radioactive iodine therapy with subsequent hypothyroidism, CAD s/p stents, most recently 4/2021 DARYA to ramus, and CABG, mitral valve repair, KOFI repair and intra-op MAZE ablation 2003, sleep apnea on CPAP (poor compliance), COPD, PAD s/p leg stent and carotid endarterectomy, AF s/p EP study with extensive scar (durable PVI and posterior wall isolation) and no inducible Afib, chronotropic incompetence and cMRI on 8/4/21 that showed moderate0severe MR and TR with patchy endocardial and epicardial scar, suggestive of sarcoid, with LVEF 40% now s/p dual chamber ICD (Medtronic [place]) implant on 8/5/21.   Pt had a moderate size pocket hematoma post procedure. Pressure dressing was removed today. Hematoma is to the medial size of the device.  CXR w/o ptx. on day of discharge 8/7 hematoma stable as evaluated by Dr. Lopez   Device interrogation showed normal function and numbers. He is set at AAIR-DDDR  bpm.   RA sense: 0.6 mv.  Threshold 0.5 V at 0.4 ms.  Impedance 361 ohms  RV sense: 7.3 mv. Threshold 0.5 V at 0.4 ms. Impedance 380 ohms.  Shock coil impedance: 46 ohms   AP 97.7% and  5.2% so far.     He was given Lasix IV 80 mg last night and today.  Pt felt dizzy this afternoon, shortly after getting his Coreg. He reports similar symptom in the past when he got coreg.  SBP 90s mmhg.  He is feeling better now.  Tele with [facility] -VS with intermittent .      Pulm team consulted on pt. CT chest obtained. Pt to f/u pulm outpatient (for workup for pulm htn and sarcoid).   Pt is aware to f/u with his cardiologist Dr. Negrete for continue cardiac care. Patient should follow up with Dr. Giang     Home meds discussed w/ Dr. Lopez Patient is to continue taking Eliquis 5mg BID starting Tuesday, Lasix 40mg daily, Toprol XL 25mg daily  Patient provided with instructions for ppm site care and follow up    Patient had LUPE while admitted, Cr 1.7-> 1.8 60 y.o. M with pmhx significant for diabetes, CKD stage II, history of GRAVES disease s/p radioactive iodine therapy with subsequent hypothyroidism, CAD s/p stents, most recently 4/2021 DARYA to ramus, and CABG, mitral valve repair, KOFI repair and intra-op MAZE ablation 2003, sleep apnea on CPAP (poor compliance), COPD, PAD s/p leg stent and carotid endarterectomy, AF s/p EP study with extensive scar (durable PVI and posterior wall isolation) and no inducible Afib, chronotropic incompetence and cMRI on 8/4/21 that showed moderate0severe MR and TR with patchy endocardial and epicardial scar, suggestive of sarcoid, with LVEF 40% now s/p dual chamber ICD (Medtronic [place]) implant on 8/5/21.   Pt had a moderate size pocket hematoma post procedure. Pressure dressing was removed today. Hematoma is to the medial size of the device.  CXR w/o ptx. on day of discharge 8/7 hematoma stable as evaluated by Dr. Lopez   Device interrogation showed normal function and numbers. He is set at AAIR-DDDR  bpm.   RA sense: 0.6 mv.  Threshold 0.5 V at 0.4 ms.  Impedance 361 ohms  RV sense: 7.3 mv. Threshold 0.5 V at 0.4 ms. Impedance 380 ohms.  Shock coil impedance: 46 ohms   AP 97.7% and  5.2% so far.     He was given Lasix IV 80 mg last night and today.  Pt felt dizzy this afternoon, shortly after getting his Coreg. He reports similar symptom in the past when he got coreg.  SBP 90s mmhg.  He is feeling better now.  Tele with [facility] -VS with intermittent .      Pulm team consulted on pt. CT chest obtained. Pt to f/u pulm outpatient (for workup for pulm htn and sarcoid).   Pt is aware to f/u with his cardiologist Dr. Negrete for continue cardiac care. Patient should follow up with Dr. Giang     Home meds discussed w/ Dr. Lopez Patient is to continue taking Eliquis 5mg BID starting Tuesday, Lasix 40mg daily, Toprol XL 25mg daily Valsartan 160/amlodipine 5mg, plavix 75mg, atorvastatin 80mg, home DM meds.   Patient provided with instructions for ppm site care and follow up    Patient had LUPE while admitted, Cr 1.7-> 1.8 patient did not have labs on day of discharge, patient refused to stay for lab draw. Patient agreeable to see PCP within 1 week of discharge to have kidney function checked 60 y.o. M with pmhx significant for diabetes, CKD stage II, history of GRAVES disease s/p radioactive iodine therapy with subsequent hypothyroidism, CAD s/p stents, most recently 4/2021 DARYA to ramus, and CABG, mitral valve repair, KOFI repair and intra-op MAZE ablation 2003, sleep apnea on CPAP (poor compliance), COPD, PAD s/p leg stent and carotid endarterectomy, AF s/p EP study with extensive scar (durable PVI and posterior wall isolation) and no inducible Afib, chronotropic incompetence and cMRI on 8/4/21 that showed moderate0severe MR and TR with patchy endocardial and epicardial scar, suggestive of sarcoid, with LVEF 40% now s/p dual chamber ICD (Medtronic [place]) implant on 8/5/21.   Pt had a moderate size pocket hematoma post procedure. Pressure dressing was removed today. Hematoma is to the medial size of the device.  CXR w/o ptx. on day of discharge 8/7 hematoma stable as evaluated by Dr. Garrett   Device interrogation showed normal function and numbers. He is set at AAIR-DDDR  bpm.   RA sense: 0.6 mv.  Threshold 0.5 V at 0.4 ms.  Impedance 361 ohms  RV sense: 7.3 mv. Threshold 0.5 V at 0.4 ms. Impedance 380 ohms.  Shock coil impedance: 46 ohms   AP 97.7% and  5.2% so far.     He was given Lasix IV 80 mg last night and today.  Pt felt dizzy this afternoon, shortly after getting his Coreg. He reports similar symptom in the past when he got coreg.  SBP 90s mmhg.  He is feeling better now.  Tele with [facility] -VS with intermittent .        Patient had LUPE while admitted, Cr 1.7-> 1.8 patient did not have labs on day of discharge, patient refused to stay for lab draw. Patient agreeable to see PCP within 1 week of discharge to have kidney function checked     Pulm team consulted on pt. CT chest obtained. Pt to f/u pulm outpatient (for workup for pulm htn and sarcoid).   Pt is aware to f/u with his cardiologist Dr. Negrete for continue cardiac care. Patient should follow up with Dr. Giang     Home meds discussed w/ Dr. Garrett  Patient is to continue taking Eliquis 5mg BID starting Tuesday, Lasix 40mg daily, Toprol XL 25mg daily Valsartan 160/amlodipine 5mg, plavix 75mg, atorvastatin 80mg, home DM meds.    Patient provided with instructions for [facility] site care and follow up, stable for discharge as per Dr. Garrett 60 y.o. M with pmhx significant for diabetes, CKD stage II, history of GRAVES disease s/p radioactive iodine therapy with subsequent hypothyroidism, CAD s/p stents, most recently 4/2021 DARYA to ramus, and CABG, mitral valve repair, KOFI repair and intra-op MAZE ablation 2003, sleep apnea on CPAP (poor compliance), COPD, PAD s/p leg stent and carotid endarterectomy, AF s/p EP study with extensive scar (durable PVI and posterior wall isolation) and no inducible Afib, chronotropic incompetence and cMRI on 8/4/21 that showed moderate0severe MR and TR with patchy endocardial and epicardial scar, suggestive of sarcoid, with LVEF 40% now s/p dual chamber ICD (Medtronic [place]) implant on 8/5/21.   Pt had a moderate size pocket hematoma post procedure. Pressure dressing was removed today. Hematoma is to the medial size of the device.  CXR w/o ptx. on day of discharge 8/7 hematoma stable as evaluated by Dr. Garrett   Device interrogation showed normal function and numbers. He is set at AAIR-DDDR  bpm.   RA sense: 0.6 mv.  Threshold 0.5 V at 0.4 ms.  Impedance 361 ohms  RV sense: 7.3 mv. Threshold 0.5 V at 0.4 ms. Impedance 380 ohms.  Shock coil impedance: 46 ohms   AP 97.7% and  5.2% so far.     He was given Lasix IV 80 mg last night and today.  Pt felt dizzy this afternoon, shortly after getting his Coreg. He reports similar symptom in the past when he got coreg.  SBP 90s mmhg.  He is feeling better now.  Tele with AP -VS with intermittent .        Patient had LUPE while admitted, Cr 1.7-> 1.8 patient did not have labs on day of discharge, patient refused to stay for lab draw. Patient will hold valsartan/amlodipine untill he sees his primary care provider Patient agreeable to see PCP within 1 week of discharge to have kidney function checked     Pulm team consulted on pt. CT chest obtained. Pt to f/u pulm outpatient (for workup for pulm htn and sarcoid).   Pt is aware to f/u with his cardiologist Dr. Negrete for continue cardiac care. Patient should follow up with Dr. Giang     Home meds discussed w/ Dr. Garrett  Patient is to continue taking Eliquis 5mg BID starting Tuesday, Lasix 40mg daily, Toprol XL 25mg daily  plavix 75mg, atorvastatin 80mg, home DM meds.    Patient provided with instructions for ppm site care and follow up, stable for discharge as per Dr. Garrett

## 2021-08-07 ENCOUNTER — TRANSCRIPTION ENCOUNTER (OUTPATIENT)
Age: 61
End: 2021-08-07

## 2021-08-07 VITALS — TEMPERATURE: 98 F

## 2021-08-07 LAB — GLUCOSE BLDC GLUCOMTR-MCNC: 114 MG/DL — HIGH (ref 70–99)

## 2021-08-07 PROCEDURE — C1777: CPT

## 2021-08-07 PROCEDURE — 99239 HOSP IP/OBS DSCHRG MGMT >30: CPT

## 2021-08-07 PROCEDURE — 83735 ASSAY OF MAGNESIUM: CPT

## 2021-08-07 PROCEDURE — 93005 ELECTROCARDIOGRAM TRACING: CPT

## 2021-08-07 PROCEDURE — C1898: CPT

## 2021-08-07 PROCEDURE — 71250 CT THORAX DX C-: CPT

## 2021-08-07 PROCEDURE — 80048 BASIC METABOLIC PNL TOTAL CA: CPT

## 2021-08-07 PROCEDURE — C1721: CPT

## 2021-08-07 PROCEDURE — 80053 COMPREHEN METABOLIC PANEL: CPT

## 2021-08-07 PROCEDURE — 82962 GLUCOSE BLOOD TEST: CPT

## 2021-08-07 PROCEDURE — C1892: CPT

## 2021-08-07 PROCEDURE — C1889: CPT

## 2021-08-07 PROCEDURE — 85027 COMPLETE CBC AUTOMATED: CPT

## 2021-08-07 PROCEDURE — 71046 X-RAY EXAM CHEST 2 VIEWS: CPT

## 2021-08-07 PROCEDURE — 83880 ASSAY OF NATRIURETIC PEPTIDE: CPT

## 2021-08-07 PROCEDURE — 86769 SARS-COV-2 COVID-19 ANTIBODY: CPT

## 2021-08-07 PROCEDURE — 36415 COLL VENOUS BLD VENIPUNCTURE: CPT

## 2021-08-07 PROCEDURE — 82164 ANGIOTENSIN I ENZYME TEST: CPT

## 2021-08-07 PROCEDURE — C1769: CPT

## 2021-08-07 PROCEDURE — 82652 VIT D 1 25-DIHYDROXY: CPT

## 2021-08-07 RX ORDER — AMLODIPINE AND VALSARTAN 5; 320 MG/1; MG/1
1 TABLET, FILM COATED ORAL
Qty: 0 | Refills: 0 | DISCHARGE

## 2021-08-07 RX ORDER — APIXABAN 2.5 MG/1
1 TABLET, FILM COATED ORAL
Qty: 0 | Refills: 0 | DISCHARGE
Start: 2021-08-07

## 2021-08-07 RX ORDER — METOPROLOL TARTRATE 50 MG
1 TABLET ORAL
Qty: 30 | Refills: 2
Start: 2021-08-07 | End: 2021-11-04

## 2021-08-07 RX ORDER — INSULIN DETEMIR 100/ML (3)
32 INSULIN PEN (ML) SUBCUTANEOUS
Qty: 0 | Refills: 0 | DISCHARGE
Start: 2021-08-07

## 2021-08-07 RX ADMIN — INSULIN GLARGINE 32 UNIT(S): 100 INJECTION, SOLUTION SUBCUTANEOUS at 07:11

## 2021-08-07 RX ADMIN — Medication 650 MILLIGRAM(S): at 05:50

## 2021-08-07 RX ADMIN — Medication 150 MICROGRAM(S): at 05:51

## 2021-08-07 RX ADMIN — CLOPIDOGREL BISULFATE 75 MILLIGRAM(S): 75 TABLET, FILM COATED ORAL at 08:24

## 2021-08-07 RX ADMIN — Medication 12.5 MILLIGRAM(S): at 05:50

## 2021-08-07 RX ADMIN — Medication 650 MILLIGRAM(S): at 06:53

## 2021-08-07 NOTE — DISCHARGE NOTE NURSING/CASE MANAGEMENT/SOCIAL WORK - PATIENT PORTAL LINK FT
You can access the FollowMyHealth Patient Portal offered by [facility] by registering at the following website: [email or web address]. By joining [employer]’s FollowMyHealth portal, you will also be able to view your health information using other applications (apps) compatible with our system.

## 2021-08-09 LAB — ACE SERPL-CCNC: 48 U/L — SIGNIFICANT CHANGE UP (ref 14–82)

## 2021-08-12 DIAGNOSIS — D86.85 SARCOID MYOCARDITIS: ICD-10-CM

## 2021-08-15 DIAGNOSIS — Z95.5 PRESENCE OF CORONARY ANGIOPLASTY IMPLANT AND GRAFT: ICD-10-CM

## 2021-08-15 DIAGNOSIS — N18.2 CHRONIC KIDNEY DISEASE, STAGE 2 (MILD): ICD-10-CM

## 2021-08-15 DIAGNOSIS — I49.5 SICK SINUS SYNDROME: ICD-10-CM

## 2021-08-15 DIAGNOSIS — E11.51 TYPE 2 DIABETES MELLITUS WITH DIABETIC PERIPHERAL ANGIOPATHY WITHOUT GANGRENE: ICD-10-CM

## 2021-08-15 DIAGNOSIS — J44.9 CHRONIC OBSTRUCTIVE PULMONARY DISEASE, UNSPECIFIED: ICD-10-CM

## 2021-08-15 DIAGNOSIS — E11.22 TYPE 2 DIABETES MELLITUS WITH DIABETIC CHRONIC KIDNEY DISEASE: ICD-10-CM

## 2021-08-15 DIAGNOSIS — Z79.4 LONG TERM (CURRENT) USE OF INSULIN: ICD-10-CM

## 2021-08-15 DIAGNOSIS — I48.91 UNSPECIFIED ATRIAL FIBRILLATION: ICD-10-CM

## 2021-08-15 DIAGNOSIS — R00.1 BRADYCARDIA, UNSPECIFIED: ICD-10-CM

## 2021-08-15 DIAGNOSIS — I34.0 NONRHEUMATIC MITRAL (VALVE) INSUFFICIENCY: ICD-10-CM

## 2021-08-15 DIAGNOSIS — Z92.3 PERSONAL HISTORY OF IRRADIATION: ICD-10-CM

## 2021-08-15 DIAGNOSIS — I36.1 NONRHEUMATIC TRICUSPID (VALVE) INSUFFICIENCY: ICD-10-CM

## 2021-08-15 DIAGNOSIS — I13.0 HYPERTENSIVE HEART AND CHRONIC KIDNEY DISEASE WITH HEART FAILURE AND STAGE 1 THROUGH STAGE 4 CHRONIC KIDNEY DISEASE, OR UNSPECIFIED CHRONIC KIDNEY DISEASE: ICD-10-CM

## 2021-08-15 DIAGNOSIS — Z98.62 PERIPHERAL VASCULAR ANGIOPLASTY STATUS: ICD-10-CM

## 2021-08-15 DIAGNOSIS — Z98.890 OTHER SPECIFIED POSTPROCEDURAL STATES: ICD-10-CM

## 2021-08-15 DIAGNOSIS — I50.23 ACUTE ON CHRONIC SYSTOLIC (CONGESTIVE) HEART FAILURE: ICD-10-CM

## 2021-08-15 DIAGNOSIS — I27.23 PULMONARY HYPERTENSION DUE TO LUNG DISEASES AND HYPOXIA: ICD-10-CM

## 2021-08-15 DIAGNOSIS — Z79.01 LONG TERM (CURRENT) USE OF ANTICOAGULANTS: ICD-10-CM

## 2021-08-15 DIAGNOSIS — E03.9 HYPOTHYROIDISM, UNSPECIFIED: ICD-10-CM

## 2021-08-15 DIAGNOSIS — I42.8 OTHER CARDIOMYOPATHIES: ICD-10-CM

## 2021-08-15 DIAGNOSIS — G47.33 OBSTRUCTIVE SLEEP APNEA (ADULT) (PEDIATRIC): ICD-10-CM

## 2021-08-15 DIAGNOSIS — Z95.1 PRESENCE OF AORTOCORONARY BYPASS GRAFT: ICD-10-CM

## 2021-08-15 DIAGNOSIS — Z79.02 LONG TERM (CURRENT) USE OF ANTITHROMBOTICS/ANTIPLATELETS: ICD-10-CM

## 2021-08-15 DIAGNOSIS — L76.32 POSTPROCEDURAL HEMATOMA OF SKIN AND SUBCUTANEOUS TISSUE FOLLOWING OTHER PROCEDURE: ICD-10-CM

## 2021-08-15 DIAGNOSIS — I25.10 ATHEROSCLEROTIC HEART DISEASE OF NATIVE CORONARY ARTERY WITHOUT ANGINA PECTORIS: ICD-10-CM

## 2021-08-15 DIAGNOSIS — N17.9 ACUTE KIDNEY FAILURE, UNSPECIFIED: ICD-10-CM

## 2021-08-15 DIAGNOSIS — Z99.89 DEPENDENCE ON OTHER ENABLING MACHINES AND DEVICES: ICD-10-CM

## 2021-09-08 ENCOUNTER — APPOINTMENT (OUTPATIENT)
Dept: HEART AND VASCULAR | Facility: CLINIC | Age: 61
End: 2021-09-08
Payer: COMMERCIAL

## 2021-09-08 VITALS
WEIGHT: 198 LBS | DIASTOLIC BLOOD PRESSURE: 56 MMHG | HEART RATE: 88 BPM | SYSTOLIC BLOOD PRESSURE: 114 MMHG | BODY MASS INDEX: 25.41 KG/M2 | HEIGHT: 74 IN

## 2021-09-08 PROCEDURE — 93283 PRGRMG EVAL IMPLANTABLE DFB: CPT

## 2021-09-08 NOTE — PHYSICAL EXAM
[Well Developed] : well developed [Well Nourished] : well nourished [No Acute Distress] : no acute distress [Normal Conjunctiva] : normal conjunctiva [Normal Venous Pressure] : normal venous pressure [5th Left ICS - MCL] : palpated at the 5th LICS in the midclavicular line [Normal Rate] : normal [Rhythm Regular] : regular [Clear Lung Fields] : clear lung fields [Good Air Entry] : good air entry [No Respiratory Distress] : no respiratory distress  [No Edema] : no edema [No Cyanosis] : no cyanosis [No Rash] : no rash [Moves all extremities] : moves all extremities [Alert and Oriented] : alert and oriented

## 2021-09-13 NOTE — ASSESSMENT
[FreeTextEntry1] : Medtronic [place]\par AP 95%\par  8%\par short bursts AT \par Impedance , \par sense RA 0.5, RV 6.8\par threshold RA 0.75@0.4, RV 0.5@0.5\par \par Medtronic printer out of inl

## 2021-09-13 NOTE — HISTORY OF PRESENT ILLNESS
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 \par \par In 2003 he had 1 vessel CABG, MV repair, exclusion of KOFI and intra-op afib ablatio.  He did well post surgery with recurrence in 2009 but spontaneously converted to NSR.  About 4 years ago he noted that he was back in atrial fibrillation.  He complained of constant fatigue, SOB and lightheadedness.  No chest pain, palpitation or syncope.  He is compliant with ELiquis and there was concern he was back in afib.  EP study with 3D mapping of the left atrium showed durable PVI and posterior wall isolation (entrance and exit block) R atrium also with extensive scar except for small area by the sinus node region.  No inducible atrial arrhythmias.   He was recommended for a pacemaker but wanted to think about it given chronotropic incompetence.  While in hospital isuprel was given but HR only went to 80s.  He was recommended for a MRI to rule out sarcoid or other causes for extensive scare (likely Maze) but states he is "too weak and too sick" to walk to the MRI.  \par \par  Event monitor with rates  44- and no heart rate variability on trends,  Cardiac MRI with LVEF 40%, decreased RV function and scar concerning for cardiac sarcoid.  He underwent a dual chamber pacemaker.  SInce then he notes more energy but he still has SOB.  No device related issues.  No chest pain, palpitations or syncope. \par \par

## 2021-09-13 NOTE — DISCUSSION/SUMMARY
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  ICD incision is well healed . Interrogation reveals normal function and all measured data is within normal limits.  No changes made today.  He will follow up in 4 months or sooner if needed.  He knows to call with any questions or concerns.  \par

## 2021-09-13 NOTE — REVIEW OF SYSTEMS
[Dyspnea on exertion] : dyspnea during exertion [Anxiety] : anxiety [Fever] : no fever [Chills] : no chills [Chest Discomfort] : no chest discomfort [Palpitations] : no palpitations [Syncope] : no syncope [Joint Pain] : no joint pain [Rash] : no rash [Confusion] : no confusion was observed [Easy Bleeding] : no tendency for easy bleeding [Negative] : Respiratory

## 2021-09-13 NOTE — CARDIOLOGY SUMMARY
[de-identified] : 6/6/21 afib with a ventricular rate of 77bpm\par 7/21/21 no P waves ventricular rate 80 [de-identified] : 5/14/21 mod MR. mod pHTN, mild RI, mod-sever TR. normal LA size.  EF 45% [de-identified] : s/p cardiac cath 4/22/21: s/p PTCA/DARYA to Ramus (80%), D1 90% (fills via \par LIMA), pLAD 30-50%, RCA mild diffuse dz.  EDP 25, EF 55%, no AS, no MR.

## 2021-09-29 ENCOUNTER — APPOINTMENT (OUTPATIENT)
Dept: HEART FAILURE | Facility: CLINIC | Age: 61
End: 2021-09-29

## 2021-11-02 RX ORDER — SERTRALINE HYDROCHLORIDE 100 MG/1
100 TABLET, FILM COATED ORAL TWICE DAILY
Refills: 0 | Status: ACTIVE | COMMUNITY
Start: 2021-09-28

## 2021-11-03 ENCOUNTER — APPOINTMENT (OUTPATIENT)
Dept: HEART FAILURE | Facility: CLINIC | Age: 61
End: 2021-11-03
Payer: COMMERCIAL

## 2021-11-03 VITALS
OXYGEN SATURATION: 99 % | HEIGHT: 74 IN | SYSTOLIC BLOOD PRESSURE: 127 MMHG | HEART RATE: 92 BPM | RESPIRATION RATE: 16 BRPM | BODY MASS INDEX: 25.41 KG/M2 | WEIGHT: 198 LBS | DIASTOLIC BLOOD PRESSURE: 57 MMHG | TEMPERATURE: 97.6 F

## 2021-11-03 DIAGNOSIS — Z82.0 FAMILY HISTORY OF EPILEPSY AND OTHER DISEASES OF THE NERVOUS SYSTEM: ICD-10-CM

## 2021-11-03 DIAGNOSIS — Z82.49 FAMILY HISTORY OF ISCHEMIC HEART DISEASE AND OTHER DISEASES OF THE CIRCULATORY SYSTEM: ICD-10-CM

## 2021-11-03 DIAGNOSIS — I50.30 UNSPECIFIED DIASTOLIC (CONGESTIVE) HEART FAILURE: ICD-10-CM

## 2021-11-03 DIAGNOSIS — Z83.3 FAMILY HISTORY OF DIABETES MELLITUS: ICD-10-CM

## 2021-11-03 PROCEDURE — 94618 PULMONARY STRESS TESTING: CPT | Mod: 59

## 2021-11-03 PROCEDURE — 99401 PREV MED CNSL INDIV APPRX 15: CPT

## 2021-11-03 PROCEDURE — 99205 OFFICE O/P NEW HI 60 MIN: CPT | Mod: 25

## 2021-11-05 ENCOUNTER — NON-APPOINTMENT (OUTPATIENT)
Age: 61
End: 2021-11-05

## 2021-11-05 LAB
ALBUMIN SERPL ELPH-MCNC: 4.6 G/DL
ALP BLD-CCNC: 78 U/L
ALT SERPL-CCNC: 20 U/L
ANION GAP SERPL CALC-SCNC: 14 MMOL/L
AST SERPL-CCNC: 19 U/L
BILIRUB SERPL-MCNC: 0.2 MG/DL
BUN SERPL-MCNC: 47 MG/DL
CALCIUM SERPL-MCNC: 9.4 MG/DL
CHLORIDE SERPL-SCNC: 106 MMOL/L
CO2 SERPL-SCNC: 18 MMOL/L
CREAT SERPL-MCNC: 1.73 MG/DL
GLUCOSE SERPL-MCNC: 134 MG/DL
MAGNESIUM SERPL-MCNC: 2.2 MG/DL
NT-PROBNP SERPL-MCNC: 528 PG/ML
POTASSIUM SERPL-SCNC: 5.6 MMOL/L
PROT SERPL-MCNC: 8.2 G/DL
SODIUM SERPL-SCNC: 138 MMOL/L

## 2021-11-08 ENCOUNTER — NON-APPOINTMENT (OUTPATIENT)
Age: 61
End: 2021-11-08

## 2021-11-08 ENCOUNTER — APPOINTMENT (OUTPATIENT)
Dept: HEART AND VASCULAR | Facility: CLINIC | Age: 61
End: 2021-11-08
Payer: COMMERCIAL

## 2021-11-08 PROCEDURE — 93295 DEV INTERROG REMOTE 1/2/MLT: CPT

## 2021-11-08 PROCEDURE — 93296 REM INTERROG EVL PM/IDS: CPT

## 2021-11-09 PROBLEM — Z82.49 FAMILY HISTORY OF CORONARY ARTERY DISEASE: Status: ACTIVE | Noted: 2021-11-09

## 2021-11-09 PROBLEM — Z82.49 FAMILY HISTORY OF CONGESTIVE HEART FAILURE: Status: ACTIVE | Noted: 2021-11-09

## 2021-11-09 PROBLEM — Z83.3 FAMILY HISTORY OF DIABETES MELLITUS: Status: ACTIVE | Noted: 2021-11-09

## 2021-11-09 PROBLEM — Z82.0 FAMILY HISTORY OF SLEEP APNEA: Status: ACTIVE | Noted: 2021-11-09

## 2021-11-09 PROBLEM — I50.30 (HFPEF) HEART FAILURE WITH PRESERVED EJECTION FRACTION: Status: ACTIVE | Noted: 2021-11-09

## 2021-11-11 ENCOUNTER — NON-APPOINTMENT (OUTPATIENT)
Age: 61
End: 2021-11-11

## 2021-11-17 ENCOUNTER — APPOINTMENT (OUTPATIENT)
Dept: HEART FAILURE | Facility: CLINIC | Age: 61
End: 2021-11-17
Payer: COMMERCIAL

## 2021-11-17 PROCEDURE — 99443: CPT

## 2021-11-26 NOTE — HISTORY OF PRESENT ILLNESS
[FreeTextEntry1] : Mr. Jimenez is a 60 y/o M with PMH of CAD s/p 1v CABG with MV repair, KOFI clip and MAZE (2003), s/p DARYA to ramus (4/2021), HFpEF (LVEF 45%), AFib (on Eliquis) s/p dual chamber ICD who presents today for initial HF evaluation. His other comorbidities include DM2 (A1C 8.2%), CKD Stage 2 (most recent Cr 1.7, previously 1.2 4/2021), Grave's disease s/p radioactive iodine with subsequent hypothyroidism, MAEGAN (on CPAP), COPD, PAD s/p leg stent and s/p carotid endarterectomy.\par \par He underwent recent EP study which did not show AFib but noted extensive scarring and chronotropic incompetence. Cardiac MRI done in 8/2021 showed biventricular systolic dysfunction with patchy epicardial and endocardial scar, concerning for sarcoidosis. Given above findings he underwent placement of dual chamber ICD in 8/2021.\par \par He reports noting a slow decline in his activity tolerance over the past ten years particularly noting worsening fatigue the past 2 years. He had never been hospitalized for heart failure. He was hospitalized 8/5-8/7 at [facility] for device implant and since that time has been feeling much better. He previously was only able to walk short distances before needing to stop due to dyspnea and fatigue. Prior to his device he noted abdominal distention and LE edema which have both improved. Over the past few months he does not feel limited in his activity due to dyspnea, but by discomfort in his calves. More recently he's been back at the gym walking on the treadmill for about 5 minutes and doing some weight lifting. Notably he's had a 20 lb unintentional weight loss, which he feels was primarily fluid weight. His weight declined after his procedure in August but in the recent weeks has plateaued around 200lbs. His home SBP is generally 100-120. He denies orthopnea, PND, CP, palpitations, lightheadedness, dizziness, abdominal bloating and LE edema. He reports his appetite to be good and his bowel and bladder habits to be normal for him.\par \par Additionally of note, during his hospitalization in August he was enrolled in TRANSFORM and TRANSFORM-ancillary research studies.

## 2021-11-26 NOTE — CARDIOLOGY SUMMARY
[de-identified] : \par 08/6/2021 EKG: atrial paced HR 82 [de-identified] : \par 05/14/21 TTE: LVEF 45%, LVIDd 5.4cm, LA 4.8cm, septum and PWT 1.2cm, RVE with RVSD, annuloplasty ring in mitral position, mod MR, mean transmitral valve gradient 7 mm Hg, mod-severe TR, est RVSP 53 mm Hg [de-identified] : \par 08/04/21 cMRI: LVEF 47% (global), LV edvi 92, RVE with RVSD, s/p MV annuloplasty, mild MR, severe TR, mid myocardial scar, basal-mid anteroseptal, focal mid myocardial enhancement, mid-anterolateral epicardial mid-apical and lateral consistent with NICM and possible sarcoid [de-identified] : \par 8/2021: DC-ICD [de-identified] : \par 04/22/21 LHC: LM normal, pLAD 40%, D1 95%, supplied by patent bypass graft, Cx minor luminal irregularities, RCA mild luminal irregularities, Mckay 80% s/p DARYA, Ao 162/70/87, LVEDP 30

## 2021-11-26 NOTE — REASON FOR VISIT
[Cardiac Failure] : cardiac failure [FreeTextEntry3] : Dr. Garrett [FreeTextEntry1] : PATIENT INSTRUCTIONS:\par \par 1. We will discuss resuming an SGLT2 inhibitor (farxiga or jardiance) with Dr. Najera\par \par 2. We will check blood work today and will follow up with you regarding the results\par \par 3. Following review of your lab work we will consider switching your valsartan-amlodipine to Entresto given your reduced heart function\par \par 4. We will refer you for a whole body PET CT with Dr. Cedillo to further evaluate for sarcoid\par \par 5. We will order an abdominal ultrasound to evaluate for abdominal fluid\par \par 6. Our exercise physiologist will give an exercise prescription\par \par 7. Telehealth visit with nurse practitioner in 2 weeks and follow up with Dr. Castañeda in [facility] 2022

## 2021-11-26 NOTE — PHYSICAL EXAM
[No Xanthelasma] : no xanthelasma [Normal S1, S2] : normal S1, S2 [Soft] : abdomen soft [Non Tender] : non-tender [Normal Bowel Sounds] : normal bowel sounds [Normal Gait] : normal gait [Normal Radial B/L] : normal radial B/L [No Rub] : no rub [No Gallop] : no gallop [Murmur] : murmur [Normal] : no rash, no skin lesions [de-identified] : not assessed - wearing a mask [de-identified] : JVP 8 cm H2O, no HJR [de-identified] : II/VI MAURISIO HUIB [de-identified] : unable to appreciate HSM or masses due to body habitus [de-identified] : warm peripherally

## 2021-11-30 ENCOUNTER — NON-APPOINTMENT (OUTPATIENT)
Age: 61
End: 2021-11-30

## 2021-12-15 ENCOUNTER — NON-APPOINTMENT (OUTPATIENT)
Age: 61
End: 2021-12-15

## 2021-12-15 ENCOUNTER — APPOINTMENT (OUTPATIENT)
Dept: HEART FAILURE | Facility: CLINIC | Age: 61
End: 2021-12-15

## 2022-01-07 ENCOUNTER — NON-APPOINTMENT (OUTPATIENT)
Age: 62
End: 2022-01-07

## 2022-02-08 ENCOUNTER — NON-APPOINTMENT (OUTPATIENT)
Age: 62
End: 2022-02-08

## 2022-02-08 ENCOUNTER — APPOINTMENT (OUTPATIENT)
Dept: HEART AND VASCULAR | Facility: CLINIC | Age: 62
End: 2022-02-08

## 2022-03-29 ENCOUNTER — APPOINTMENT (OUTPATIENT)
Dept: HEART FAILURE | Facility: CLINIC | Age: 62
End: 2022-03-29

## 2022-04-01 ENCOUNTER — NON-APPOINTMENT (OUTPATIENT)
Age: 62
End: 2022-04-01

## 2022-04-04 ENCOUNTER — NON-APPOINTMENT (OUTPATIENT)
Age: 62
End: 2022-04-04

## 2022-04-04 NOTE — DISCHARGE NOTE PROVIDER - CARE PROVIDERS DIRECT ADDRESSES
,[email or web address],DirectAddress_[email or web address] ,[email or web address],DirectAddress_Unknown,DirectAddress_Unknown normal

## 2022-04-06 ENCOUNTER — APPOINTMENT (OUTPATIENT)
Dept: HEART AND VASCULAR | Facility: CLINIC | Age: 62
End: 2022-04-06
Payer: COMMERCIAL

## 2022-04-06 VITALS
DIASTOLIC BLOOD PRESSURE: 60 MMHG | BODY MASS INDEX: 25.41 KG/M2 | TEMPERATURE: 97.8 F | HEIGHT: 74 IN | HEART RATE: 84 BPM | WEIGHT: 198 LBS | SYSTOLIC BLOOD PRESSURE: 121 MMHG

## 2022-04-06 PROCEDURE — 99213 OFFICE O/P EST LOW 20 MIN: CPT | Mod: 25

## 2022-04-06 PROCEDURE — 93290 INTERROG DEV EVAL ICPMS IP: CPT | Mod: 26

## 2022-04-06 PROCEDURE — 93283 PRGRMG EVAL IMPLANTABLE DFB: CPT

## 2022-04-06 PROCEDURE — 99072 ADDL SUPL MATRL&STAF TM PHE: CPT

## 2022-04-13 NOTE — HISTORY OF PRESENT ILLNESS
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 \par \par In 2003 he had 1 vessel CABG, MV repair, exclusion of KOFI and intra-op afib ablatio.  He did well post surgery with recurrence in 2009 but spontaneously converted to NSR.  About 4 years ago he noted that he was back in atrial fibrillation.  He complained of constant fatigue, SOB and lightheadedness.  No chest pain, palpitation or syncope.  He is compliant with ELiquis and there was concern he was back in afib.  EP study with 3D mapping of the left atrium showed durable PVI and posterior wall isolation (entrance and exit block) R atrium also with extensive scar except for small area by the sinus node region.  No inducible atrial arrhythmias.   He was recommended for a pacemaker but wanted to think about it given chronotropic incompetence.  While in hospital isuprel was given but HR only went to 80s.  He was recommended for a MRI to rule out sarcoid or other causes for extensive scare (likely Maze) but states he is "too weak and too sick" to walk to the MRI.  \par \par  Event monitor with rates  44- and no heart rate variability on trends,  Cardiac MRI with LVEF 40%, decreased RV function and scar concerning for cardiac sarcoid.  He underwent a dual chamber ICD.  He states he felt great post ICD implant but recently with some fatigue and OBRIEN again.  He recently went to the bathroom and lost consciousness.  HE was lightheaded prior.  This correlated with Vfib and appropriate ICD shock.     No device related issues.  No chest pain, palpitations or syncope. \par \par  \par

## 2022-04-13 NOTE — DISCUSSION/SUMMARY
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  ICD incision is well healed . Interrogation reveals normal function and all measured data is within normal limits. One episode of spontaneous Vfib with appropriate ICD therapy.  No PVC prior to event.  We discussed that the device worked appropriately.  He will stay well hydrated and will will continue with observation.      No changes made today.  He will follow up in 3 months or sooner if needed.  He knows to call with any questions or concerns.  \par

## 2022-04-13 NOTE — PROCEDURE
[No] : not [NSR] : normal sinus rhythm [ICD] : Implantable cardioverter-defibrillator [Longevity: ___ months] : The estimated remaining battery life is [unfilled] months [Threshold Testing Performed] : Threshold testing was performed [Lead Imp:  ___ohms] : lead impedance was [unfilled] ohms [Sensing Amplitude ___mv] : sensing amplitude was [unfilled] mv [___V @] : [unfilled] V [___ ms] : [unfilled] ms [None] : none [de-identified] : SRINI [de-identified] : cobalt [de-identified] : 2021 [de-identified] : AAIR<-->DDR [de-identified] :  [de-identified] : AP 89%\par  7%\par optivol below threshold\par shock impedance 69

## 2022-04-13 NOTE — PHYSICAL EXAM
[Well Developed] : well developed [Well Nourished] : well nourished [No Acute Distress] : no acute distress [Normal Conjunctiva] : normal conjunctiva [Normal Venous Pressure] : normal venous pressure [5th Left ICS - MCL] : palpated at the 5th LICS in the midclavicular line [Normal Rate] : normal [Rhythm Regular] : regular [Clear Lung Fields] : clear lung fields [Good Air Entry] : good air entry [No Respiratory Distress] : no respiratory distress  [No Edema] : no edema [No Cyanosis] : no cyanosis [No Rash] : no rash [Moves all extremities] : moves all extremities [Alert and Oriented] : alert and oriented [Clean] : clean [Dry] : dry [Well-Healed] : well-healed [Palpable Crepitus] : no palpable crepitus [Bleeding] : no active bleeding [Foul Odor] : no foul smell [Purulent Drainage] : no purulent drainage [Serosanguineous Drainage] : no serosanquineous drainage [Serous Drainage] : no serous drainage [Warm] : not warm [Erythema] : not erythematous [Tender] : not tender [Indurated] : not indurated [Fluctuant] : not fluctuant

## 2022-04-13 NOTE — CARDIOLOGY SUMMARY
[de-identified] : 6/6/21 afib with a ventricular rate of 77bpm\par 7/21/21 no P waves ventricular rate 80 [de-identified] : 5/14/21 mod MR. mod pHTN, mild WV, mod-sever TR. normal LA size.  EF 45% [de-identified] : s/p cardiac cath 4/22/21: s/p PTCA/DARYA to Ramus (80%), D1 90% (fills via \par LIMA), pLAD 30-50%, RCA mild diffuse dz.  EDP 25, EF 55%, no AS, no MR.

## 2022-04-13 NOTE — REVIEW OF SYSTEMS
[Dyspnea on exertion] : dyspnea during exertion [Anxiety] : anxiety [Negative] : Neurological [Feeling Fatigued] : feeling fatigued [Fever] : no fever [Chills] : no chills [Chest Discomfort] : no chest discomfort [Palpitations] : no palpitations [Syncope] : no syncope [Joint Pain] : no joint pain [Rash] : no rash [Confusion] : no confusion was observed [Easy Bleeding] : no tendency for easy bleeding

## 2022-05-10 ENCOUNTER — NON-APPOINTMENT (OUTPATIENT)
Age: 62
End: 2022-05-10

## 2022-07-12 RX ORDER — EMPAGLIFLOZIN 10 MG/1
10 TABLET, FILM COATED ORAL
Qty: 30 | Refills: 3 | Status: DISCONTINUED | COMMUNITY
Start: 2021-11-11 | End: 2022-07-12

## 2022-07-12 RX ORDER — ATORVASTATIN CALCIUM 80 MG/1
80 TABLET, FILM COATED ORAL DAILY
Refills: 0 | Status: DISCONTINUED | COMMUNITY
End: 2022-07-12

## 2022-07-12 RX ORDER — AMLODIPINE AND VALSARTAN 5; 160 MG/1; MG/1
5-160 TABLET, FILM COATED ORAL DAILY
Refills: 0 | Status: DISCONTINUED | COMMUNITY
End: 2022-07-12

## 2022-07-12 RX ORDER — TADALAFIL 5 MG/1
5 TABLET, FILM COATED ORAL
Refills: 0 | Status: DISCONTINUED | COMMUNITY
End: 2022-07-12

## 2022-07-13 ENCOUNTER — APPOINTMENT (OUTPATIENT)
Dept: HEART FAILURE | Facility: CLINIC | Age: 62
End: 2022-07-13

## 2022-07-13 NOTE — REASON FOR VISIT
[Cardiac Failure] : cardiac failure [FreeTextEntry3] : Dr. Garrett [FreeTextEntry1] : PATIENT INSTRUCTIONS:\par

## 2022-07-13 NOTE — DISCUSSION/SUMMARY
[Patient] : the patient [FreeTextEntry1] : Mr. Jimenez is a 60 y/o M with NICM, LVEF 45%, CAD s/p 1v CABG with subsequent PCI, AFib, CKD stage 2, DM, PAD and hypertension who is overall doing well since implantation of dual chamber ICD. He is ACC/AHA Stage C, NYHA Class II HF, euvolemic on exam. I have recommended the following:\par \par 1. NICM - Stable and well compensated. Given patchy scarring noted on cardiac MRI will arrange for a PET/CT with Dr. Vidhya Cedillo at [facility] to evaluate for sarcoidosis. Will check labs today to reassess renal function and potassium with plan to change amlodipine-valsartan to Entresto given moderate systolic dysfunction. Additionally will discuss with Dr. Najera his endocrinologist resuming SGLT2i with reduction in loop diuretics to lasix 20mg daily. Will continue eplerenone 50mg daily. He's enrolled in TRANSFORM and ancillary clinical trials. I have ordered an abdominal ultrasound to assess for ascites.\par \par 2. CAD s/p CABG - Stable without s/s of ischemia. He was taken off antiplatelet therapy following admission for GIB at [place]. Should be resumed on beta blocker now s/p dual chamber ICD. Continue statin.\par \par 3. CKD II- Most recent labs in August show Cr 1.7 from 1.2-1.3 in April. Will reassess with labs today. Will discuss SGLT2i as above which may slow progression of CKD.\par \par 4. PAD s/p LE stent - Notes LE pain with ambulation. Recommended follow up with vascular for further evaluation.\par \par 5. HTN - Well controlled on current dose of valsartan-amlodipine. Plan to transition to Entresto as noted above.\par \par 6. DM2 - Not ideally controlled with A1C 8.2%. Follow up with Dr. Najera.\par \par 7. Follow up with Telehealth visit with nurse practitioner every 2 weeks and follow up with Dr. Castañeda in January 2022.\par

## 2022-07-13 NOTE — CARDIOLOGY SUMMARY
[de-identified] : \par 08/6/2021 EKG: atrial paced HR 82 [de-identified] : \par 05/14/21 TTE: LVEF 45%, LVIDd 5.4cm, LA 4.8cm, septum and PWT 1.2cm, RVE with RVSD, annuloplasty ring in mitral position, mod MR, mean transmitral valve gradient 7 mm Hg, mod-severe TR, est RVSP 53 mm Hg [de-identified] : \par 08/04/21 cMRI: LVEF 47% (global), LV edvi 92, RVE with RVSD, s/p MV annuloplasty, mild MR, severe TR, mid myocardial scar, basal-mid anteroseptal, focal mid myocardial enhancement, mid-anterolateral epicardial mid-apical and lateral consistent with NICM and possible sarcoid [de-identified] : \par 8/2021: DC-ICD [de-identified] : \par 04/22/21 LHC: LM normal, pLAD 40%, D1 95%, supplied by patent bypass graft, Cx minor luminal irregularities, RCA mild luminal irregularities, Mckay 80% s/p DARYA, Ao 162/70/87, LVEDP 30

## 2022-07-13 NOTE — PHYSICAL EXAM
[No Xanthelasma] : no xanthelasma [Normal S1, S2] : normal S1, S2 [No Rub] : no rub [No Gallop] : no gallop [Murmur] : murmur [Soft] : abdomen soft [Non Tender] : non-tender [Normal Bowel Sounds] : normal bowel sounds [Normal Gait] : normal gait [Normal Radial B/L] : normal radial B/L [Normal] : alert and oriented, normal memory [de-identified] : not assessed - wearing a mask [de-identified] : JVP 8 cm H2O, no HJR [de-identified] : II/VI MAURISIO HUIB [de-identified] : unable to appreciate HSM or masses due to body habitus [de-identified] : warm peripherally

## 2022-07-13 NOTE — HISTORY OF PRESENT ILLNESS
[FreeTextEntry1] : Mr. Jimenez is a 62 y/o M with history of HFmrEF (LVIDd 5.4 cm, LVEF 45%), CAD s/p 1v CABG with MV repair, KOFI clip and MAZE (2003), s/p DARYA to ramus (4/2021), AFib (on Eliquis), chronotropic incompetence s/p s/p dual chamber ICD (8/2021), VT with ICD shock (3/31/22), DM2 (Ha1c 8.2%), CKD (1.2-1.3), Grave's disease s/p radioactive iodine with subsequent hypothyroidism, MAEGAN (on CPAP), COPD, PAD s/p leg stent and s/p carotid endarterectomy who presents today for routine follow-up of his cardiomyopathy. He has a history of poor medical compliance. \par \par He underwent EP study which did not show AFib but noted extensive scarring and chronotropic incompetence. Cardiac MRI done in 8/2021 showed biventricular systolic dysfunction with patchy epicardial and endocardial scar, concerning for sarcoidosis. Given above findings he underwent placement of dual chamber ICD in 8/2021. He has never been hospitalized for heart failure. \par \par He was seen as an initial consult in November 2021 but subsequently lost to follow-up with reported lapse in medical insurance coverage. More recently on 3/31, suffered from VF with appropriate ICD shock. He declined to present to ED. He had contacted our office in January for concern of fluid overload and again declined to present to ED for evaluation and management.

## 2022-08-10 ENCOUNTER — APPOINTMENT (OUTPATIENT)
Dept: HEART AND VASCULAR | Facility: CLINIC | Age: 62
End: 2022-08-10

## 2022-09-19 ENCOUNTER — APPOINTMENT (OUTPATIENT)
Dept: HEART FAILURE | Facility: CLINIC | Age: 62
End: 2022-09-19

## 2022-11-16 ENCOUNTER — NON-APPOINTMENT (OUTPATIENT)
Age: 62
End: 2022-11-16

## 2022-11-16 ENCOUNTER — APPOINTMENT (OUTPATIENT)
Dept: HEART AND VASCULAR | Facility: CLINIC | Age: 62
End: 2022-11-16
Payer: COMMERCIAL

## 2022-11-16 PROCEDURE — 93296 REM INTERROG EVL PM/IDS: CPT

## 2022-11-16 PROCEDURE — 93295 DEV INTERROG REMOTE 1/2/MLT: CPT

## 2022-11-30 NOTE — DISCHARGE NOTE PROVIDER - [id number]_CARD_ALL_CORE
"Diagnosis:   1. Dermatomyositis (CMS/HCC)    2. Muscle weakness        Patient arrives for infusion today. Alice Blanchard MD, is the ordering clinician, therapy plan orders reviewed and signed by clinician. Vital Signs:  ONC OP Encounter Vitals  BP: (!) 160/77  Heart Rate: (!) 108  Resp: 18  Temp: 98.2 Â°F (36.8 Â°C)  SpO2: 100 %  Weight: 65.3 kg (144 lb)  Height: 5' 6"" (1.676 m)  BSA (Calculated - m2) - Jude Powell & Shahrzad: 1.74  BSA (Calculated - sq m): 1.74  BMI (Calculated): 23.24      Allergies:  ALLERGIES:  No Known Allergies      Labs reviewed with the patient: Yes    Nursing Summary: Pt. [facility] at [facility] for IVIG infusion. IV placed in [address] [place] [address], blood return noted and flushes well. Patient tolerated infusion. IV discontinued, guaze and bandaid applied. Patient discharged in stable, ambulatory condition. Patient condition stable during treatment? Yes  Questions were answered and understanding was verbalized. Yes   Education provided Yes    Patient advised on follow up, any and all questions answered.   Patient Discharged to home [place] with self  " No

## 2023-02-10 ENCOUNTER — NON-APPOINTMENT (OUTPATIENT)
Age: 63
End: 2023-02-10

## 2023-02-10 ENCOUNTER — APPOINTMENT (OUTPATIENT)
Dept: HEART FAILURE | Facility: CLINIC | Age: 63
End: 2023-02-10
Payer: COMMERCIAL

## 2023-02-10 VITALS
HEART RATE: 81 BPM | HEIGHT: 74 IN | TEMPERATURE: 98.4 F | SYSTOLIC BLOOD PRESSURE: 130 MMHG | BODY MASS INDEX: 25.81 KG/M2 | OXYGEN SATURATION: 96 % | WEIGHT: 201.13 LBS | DIASTOLIC BLOOD PRESSURE: 50 MMHG

## 2023-02-10 PROCEDURE — 99215 OFFICE O/P EST HI 40 MIN: CPT

## 2023-02-10 RX ORDER — PANTOPRAZOLE 40 MG/1
40 TABLET, DELAYED RELEASE ORAL
Qty: 60 | Refills: 0 | Status: DISCONTINUED | COMMUNITY
Start: 2022-04-07 | End: 2023-02-10

## 2023-02-10 NOTE — CARDIOLOGY SUMMARY
[de-identified] : \par 08/6/2021 EKG: atrial paced HR 82 [de-identified] : \par 05/14/21 TTE: LVEF 45%, LVIDd 5.4cm, LA 4.8cm, septum and PWT 1.2cm, RVE with RVSD, annuloplasty ring in mitral position, mod MR, mean transmitral valve gradient 7 mm Hg, mod-severe TR, est RVSP 53 mm Hg [de-identified] : \par 08/04/21 cMRI: LVEF 47% (global), LV edvi 92, RVE with RVSD, s/p MV annuloplasty, mild MR, severe TR, mid myocardial scar, basal-mid anteroseptal, focal mid myocardial enhancement, mid-anterolateral epicardial mid-apical and lateral consistent with NICM and possible sarcoid [de-identified] : \par 8/2021: DC-ICD [de-identified] : \par 04/22/21 LHC: LM normal, pLAD 40%, D1 95%, supplied by patent bypass graft, Cx minor luminal irregularities, RCA mild luminal irregularities, Mckay 80% s/p DARYA, Ao 162/70/87, LVEDP 30

## 2023-02-10 NOTE — PHYSICAL EXAM
[No Xanthelasma] : no xanthelasma [Normal S1, S2] : normal S1, S2 [No Rub] : no rub [No Gallop] : no gallop [Murmur] : murmur [Soft] : abdomen soft [Non Tender] : non-tender [Normal Bowel Sounds] : normal bowel sounds [Normal Gait] : normal gait [Normal Radial B/L] : normal radial B/L [Normal] : alert and oriented, normal memory [de-identified] : not assessed - wearing a mask [de-identified] : JVP 8 cm H2O, no HJR [de-identified] : II/VI MAURISIO HUIB [de-identified] : unable to appreciate HSM or masses due to body habitus [de-identified] : warm peripherally

## 2023-02-10 NOTE — ASSESSMENT
[FreeTextEntry1] : Mr. Jimenez is a 60 y/o M with NICM, LVEF 45%, CAD s/p 1v CABG with subsequent PCI, AFib, CKD stage 2, DM, PAD and hypertension who comes in to establish care with heart failure in [facility]. He is ACC/AHA Stage C, NYHA Class II-II HF, euvolemic on exam. I have recommended the following:\par \par # Chronic systolic heart failure \par -GDMT:  only on eplerenone. Will start entresto today and add further GDMT in the coming weeks\par -Diuretics:  Not on diuretics and currently not indicated\par -Labs:   Will get repeat labs once he gets started on entresto\par -Device: Has an ICD and wants to switch care to [facility] \par -Cardiac rehab: Will make a referral on the next visit\par -Advanced Therapies: CPET for risk stratification once GDMT optimized \par -MAEGAN screening: Has sleep apnea but recently has not been wearing his mask, encourages make use\par -Social History: Is seperated from his wife, denies smoking, social alcohol use. Smokes marijuana for sleep \par -HF lifestyle modifications including daily weights, BP log, and low sodium diet reiterated. Pt given daily weight and BP log and verbalized understanding to notify office if >3lb/1 day or >5 lb/1 week weight gain\par - There was a concern for sarcoid given his cardiac MRI and VT. Will order PET/CT\par - Will refer for genetic testing next visit, his mother and sister both have heart failure\par \par \par # CAD s/p CABG - Stable without s/s of ischemia. He was taken off antiplatelet therapy following admission for GIB at [place].\par - c/w statin\par - will have him followup with his gen cards or have him restablish care with our group\par \par # CKD II\par - most recently creatinine is 1.3\par - will followup repeat bloodwork\par \par #PAD s/p LE stent\par - No symptoms currently but encouraged him to follow with vascular if any recurrent symptoms\par \par #DM2 - Not ideally controlled with A1C 8.2%. Is establishing care with endocrine\par \par

## 2023-02-10 NOTE — HISTORY OF PRESENT ILLNESS
[FreeTextEntry1] : Mr. Jimenez is a 60 y/o M with PMH of CAD s/p 1v CABG with MV repair, KOFI clip and MAZE (2003), s/p DARYA to ramus (4/2021), HFpEF (LVEF 45%), AFib (on Eliquis) s/p dual chamber ICD who presents today for initial HF evaluation. His other comorbidities include DM2 (A1C 8.2%), CKD Stage 2 (most recent Cr 1.7, previously 1.2 4/2021), Grave's disease s/p radioactive iodine with subsequent hypothyroidism, MAEGAN (on CPAP), COPD, PAD s/p leg stent and s/p carotid endarterectomy.\par \par He underwent recent EP study which did not show AFib but noted extensive scarring and chronotropic incompetence. Cardiac MRI done in 8/2021 showed biventricular systolic dysfunction with patchy epicardial and endocardial scar, concerning for sarcoidosis. Given above findings he underwent placement of dual chamber ICD in 8/2021.\par \par  He was hospitalized 8/5-8/7 at [facility] for ADHF\par Of noted 1 year afo he had an episode of Vfib and was shocked but he did not even realize it. \par He has not really seen a cardiologist in about a year due to insurance reasons and his care was based at [place] and it was difficult for him to get there regularly.\par He states that was doing fine until recently and that recently he feels like he has been getting SOB with minimal exertion. He denies any recent ICD shocks or heart failure admissions. He denies orthopnea, PND, CP, palpitations, lightheadedness, dizziness, abdominal bloating and LE edema. \par

## 2023-02-15 ENCOUNTER — APPOINTMENT (OUTPATIENT)
Dept: HEART AND VASCULAR | Facility: CLINIC | Age: 63
End: 2023-02-15
Payer: COMMERCIAL

## 2023-02-15 ENCOUNTER — NON-APPOINTMENT (OUTPATIENT)
Age: 63
End: 2023-02-15

## 2023-02-15 PROCEDURE — 93296 REM INTERROG EVL PM/IDS: CPT

## 2023-02-15 PROCEDURE — 93295 DEV INTERROG REMOTE 1/2/MLT: CPT

## 2023-03-01 ENCOUNTER — APPOINTMENT (OUTPATIENT)
Dept: ELECTROPHYSIOLOGY | Facility: CLINIC | Age: 63
End: 2023-03-01
Payer: COMMERCIAL

## 2023-03-01 VITALS
OXYGEN SATURATION: 91 % | BODY MASS INDEX: 25.8 KG/M2 | HEIGHT: 74 IN | WEIGHT: 201 LBS | DIASTOLIC BLOOD PRESSURE: 62 MMHG | SYSTOLIC BLOOD PRESSURE: 108 MMHG | TEMPERATURE: 98.5 F | HEART RATE: 88 BPM

## 2023-03-01 PROCEDURE — 99203 OFFICE O/P NEW LOW 30 MIN: CPT | Mod: 25

## 2023-03-01 PROCEDURE — 93000 ELECTROCARDIOGRAM COMPLETE: CPT | Mod: 59

## 2023-03-01 PROCEDURE — 93283 PRGRMG EVAL IMPLANTABLE DFB: CPT

## 2023-03-01 NOTE — DISCUSSION/SUMMARY
[EKG obtained to assist in diagnosis and management of assessed problem(s)] : EKG obtained to assist in diagnosis and management of assessed problem(s) [FreeTextEntry1] : In summary, Mr. Jimenez is a 62 year old male with a history of CAD s/p 1v CABG, mitral valve disease s/p MV repair, atrial fibrillation (s/p MAZE/KOFI clipping), LV dysfunction (EF 45%) with MRI evidence of LV scar consistent with sarcoidosis and chronotropic incompetence s/p dual chamber ICD, and VF s/p ICD shock. The patient is doing well with no recurrent arrhythmias since the last VF event in 4/2022. Device interrogation reveals normal function and good battery life. The patient's lower rate was reprogrammed to 60 bpm which was lowered to 60 bpm (underlying rhythm was sinus at 77 bpm). He will return for follow up in 6 months or earlier if needed.

## 2023-03-01 NOTE — CARDIOLOGY SUMMARY
[de-identified] : 3/1/23: A-paced V-paced [de-identified] : 05/14/21 TTE: LVEF 45%, LVIDd 5.4cm, LA 4.8cm, septum and PWT 1.2cm, RVE with RVSD, annuloplasty ring in mitral position, mod MR, mean transmitral valve gradient 7 mm Hg, mod-severe TR, est RVSP 53 mm Hg  [de-identified] : 08/04/21 cMRI: LVEF 47% (global), LV edvi 92, RVE with RVSD, s/p MV annuloplasty, mild MR, severe TR, mid myocardial scar, basal-mid anteroseptal, focal mid myocardial enhancement, mid-anterolateral epicardial mid-apical and lateral consistent with NICM and possible sarcoid

## 2023-03-01 NOTE — HISTORY OF PRESENT ILLNESS
[FreeTextEntry1] : Mr. Jimenez is a pleasant 62 year old male refered for arrhythmia/device management. The patient has a history of atrial fibrillation, CAD s/p 1v CABG (and MV repair/MAZE, KOFI clip in 2003) and DARYA to ramus 4/2021, LV dysfunction (EF 45%), suspected sarcoidosis, chronotropic incompetence s/p dual chamber ICD (because of pacing indication in the setting of sarcoid, Dr. Aguirre at [place] 8/2021, VF s/p ICD shock, Grave's disease s/p radioactive iodine with subsequent hypothyroidism, MAEGAN, COPD, PAD s/p stent, and carotid endarterectomy. A cardiac MRI done in 8/2021 demonstrated endo and epi scar suspicious for sarcoidosis. He was also noted to have evidence of chronotropic incompetence and underwent implantation of dual chamber ICD given suspected sarcoidosis with LV dysfunction. He received an ICD shock for VF in 4/2022. There have been no sustained VT/VF events since. He is not on any antiarrhythmic drug therapy.

## 2023-03-07 ENCOUNTER — APPOINTMENT (OUTPATIENT)
Dept: CARDIOLOGY | Facility: CLINIC | Age: 63
End: 2023-03-07
Payer: COMMERCIAL

## 2023-03-07 ENCOUNTER — APPOINTMENT (OUTPATIENT)
Dept: HEART FAILURE | Facility: CLINIC | Age: 63
End: 2023-03-07
Payer: COMMERCIAL

## 2023-03-07 VITALS
DIASTOLIC BLOOD PRESSURE: 64 MMHG | TEMPERATURE: 98.3 F | WEIGHT: 201 LBS | BODY MASS INDEX: 25.8 KG/M2 | HEIGHT: 74 IN | SYSTOLIC BLOOD PRESSURE: 126 MMHG | HEART RATE: 88 BPM | OXYGEN SATURATION: 95 %

## 2023-03-07 DIAGNOSIS — I49.01 CARDIAC ARREST, CAUSE UNSPECIFIED: ICD-10-CM

## 2023-03-07 DIAGNOSIS — I46.9 CARDIAC ARREST, CAUSE UNSPECIFIED: ICD-10-CM

## 2023-03-07 PROCEDURE — 93306 TTE W/DOPPLER COMPLETE: CPT

## 2023-03-07 PROCEDURE — 99214 OFFICE O/P EST MOD 30 MIN: CPT

## 2023-03-08 PROBLEM — I46.9 CARDIAC ARREST WITH VENTRICULAR FIBRILLATION: Status: ACTIVE | Noted: 2023-03-01

## 2023-03-08 NOTE — PHYSICAL EXAM
[Well Developed] : well developed [No Acute Distress] : no acute distress [Normal Conjunctiva] : normal conjunctiva [No Xanthelasma] : no xanthelasma [Normal Venous Pressure] : normal venous pressure [Normal S1, S2] : normal S1, S2 [No Rub] : no rub [No Gallop] : no gallop [Murmur] : murmur [Clear Lung Fields] : clear lung fields [Soft] : abdomen soft [Normal Bowel Sounds] : normal bowel sounds [No Edema] : no edema [Moves all extremities] : moves all extremities [Alert and Oriented] : alert and oriented [de-identified] : II/VI MAURISIO HUIB [de-identified] : warm peripherally

## 2023-03-08 NOTE — HISTORY OF PRESENT ILLNESS
[FreeTextEntry1] : Mr. Jimenez is a 63 y/o M with PMH of CAD s/p 1v CABG with MV repair, KOFI clip and MAZE (2003), s/p DARYA to ramus (4/2021), HFpEF (LVEF 45%), AFib (on Eliquis) s/p dual chamber ICD who presents today for initial HF evaluation. His other comorbidities include DM2 (A1C 8.2%), CKD Stage 2 (most recent Cr 1.7, previously 1.2 4/2021), Grave's disease s/p radioactive iodine with subsequent hypothyroidism, MAEGAN (on CPAP), COPD, PAD s/p leg stent and s/p carotid endarterectomy.\par \par He underwent EP study which did not show AFib but noted extensive scarring and chronotropic incompetence. Cardiac MRI done in 8/2021 showed biventricular systolic dysfunction with patchy epicardial and endocardial scar, concerning for sarcoidosis. Given above findings he underwent placement of dual chamber ICD in 8/2021.\par \par 2/10/23:\par He was hospitalized 8/5-8/7 at [facility] for ADHF.\par Of note, 1 year ago he had an episode of Vfib and was shocked but he did not even realize it. \par He has not really seen a cardiologist in about a year due to insurance reasons and his care was based at [place] and it was difficult for him to get there regularly.\par He states that was doing fine until recently and that recently he feels like he has been getting SOB with minimal exertion. He denies any recent ICD shocks or heart failure admissions. He denies orthopnea, PND, CP, palpitations, lightheadedness, dizziness, abdominal bloating and LE edema. \par \par 3/8/23:\par Since his last visit he has established with EP Dr. Hutchinson and his device rate was lowered to 60bpm. \par He reports feeling well and denies overt HF symptoms. Reports that he has not been taking furosemide because his has such good UOP without it. He frequents the gym a couple days per week and walks on the treadmill for ~1/8 mile then stops due to leg stiffness but does not feel limited from a cardiovascular standpoint. Specifically denies CP, palpitations, SOB, dizziness/LH, edema, orthopnea and PND. \par

## 2023-03-08 NOTE — CARDIOLOGY SUMMARY
[de-identified] : \par 3/1/23: \par 08/6/21: atrial paced HR 82 [de-identified] : \par 05/14/21 TTE: LVEF 45%, LVIDd 5.4cm, LA 4.8cm, septum and PWT 1.2cm, RVE with RVSD, annuloplasty ring in mitral position, mod MR, mean transmitral valve gradient 7 mm Hg, mod-severe TR, est RVSP 53 mm Hg [de-identified] : \par 08/04/21 cMRI: LVEF 47% (global), LV edvi 92, RVE with RVSD, s/p MV annuloplasty, mild MR, severe TR, mid myocardial scar, basal-mid anteroseptal, focal mid myocardial enhancement, mid-anterolateral epicardial mid-apical and lateral consistent with NICM and possible sarcoid [de-identified] : \par 8/2021: DC-ICD [de-identified] : \par 04/22/21 LHC: LM normal, pLAD 40%, D1 95%, supplied by patent bypass graft, Cx minor luminal irregularities, RCA mild luminal irregularities, Mckay 80% s/p DARYA, Ao 162/70/87, LVEDP 30

## 2023-03-08 NOTE — ASSESSMENT
[FreeTextEntry1] : Mr. Jimenez is a 63 y/o M with NICM, LVEF 45%, CAD s/p 1v CABG with subsequent PCI, AFib, CKD stage 2, DM, PAD and hypertension who presents for scheduled follow-up.\par \par # ACC/AHA Stage C Chronic systolic heart failure \par -GDMT: Start Toprol-XL 25mg nightly. Continue Entresto 24-26mg BID and Eplerenone 25mg daily. \par -Diuretics: Furosemide PRN \par -Labs: Repeat labwork ordered\par -Device: ICD, recently established care with Dr. Hutchinson. Last shock 4/2022.\par -Cardiac rehab deferred, pt exercising on his own.\par -Advanced Therapies: CPET for risk stratification once GDMT optimized \par -MAEGAN screening: Has sleep apnea but recently has not been wearing his mask, encourages make use\par -Social History: Is seperated from his wife, denies smoking, social alcohol use. Smokes marijuana for sleep \par -HF lifestyle modifications including daily weights, BP log, and low sodium diet reiterated. Pt given daily weight and BP log and verbalized understanding to notify office if >3lb/1 day or >5 lb/1 week weight gain\par -There was a concern for sarcoid given his cardiac MRI and VT. Will order PET/CT.\par -Will refer to genetic specialist, his mother and sister both have heart failure.\par \par # CAD s/p CABG - Stable without s/s of ischemia. He was taken off antiplatelet therapy following admission for GIB at [place].\par - C/w statin\par -Will have him followup with his gen cards or have him reestablish care with our group\par \par # CKD II\par -Most recently Creatinine is 1.32\par -Continue to monitor\par \par # PAD s/p LE stent\par -No symptoms currently but encouraged him to follow with vascular if any recurrent symptoms.\par \par # DM2 - Not ideally controlled with A1C 8.2%. Is establishing care with endocrine.\par \par  Statement Selected

## 2023-03-09 LAB
ESTIMATED AVERAGE GLUCOSE: 186 MG/DL
HBA1C MFR BLD HPLC: 8.1 %
MAGNESIUM SERPL-MCNC: 1.8 MG/DL
NT-PROBNP SERPL-MCNC: 829 PG/ML

## 2023-03-15 LAB
ALBUMIN SERPL ELPH-MCNC: 4.7 G/DL
ALP BLD-CCNC: 68 U/L
ALT SERPL-CCNC: 12 U/L
ANION GAP SERPL CALC-SCNC: 12 MMOL/L
AST SERPL-CCNC: 12 U/L
BILIRUB SERPL-MCNC: 0.4 MG/DL
BUN SERPL-MCNC: 26 MG/DL
CALCIUM SERPL-MCNC: 9.4 MG/DL
CHLORIDE SERPL-SCNC: 103 MMOL/L
CO2 SERPL-SCNC: 23 MMOL/L
CREAT SERPL-MCNC: 1.17 MG/DL
EGFR: 71 ML/MIN/1.73M2
GLUCOSE SERPL-MCNC: 111 MG/DL
POTASSIUM SERPL-SCNC: 4.7 MMOL/L
PROT SERPL-MCNC: 7.9 G/DL
SODIUM SERPL-SCNC: 138 MMOL/L

## 2023-03-17 ENCOUNTER — APPOINTMENT (OUTPATIENT)
Dept: HEART FAILURE | Facility: CLINIC | Age: 63
End: 2023-03-17

## 2023-03-22 ENCOUNTER — NON-APPOINTMENT (OUTPATIENT)
Age: 63
End: 2023-03-22

## 2023-04-05 ENCOUNTER — APPOINTMENT (OUTPATIENT)
Dept: NUCLEAR MEDICINE | Facility: IMAGING CENTER | Age: 63
End: 2023-04-05

## 2023-04-17 ENCOUNTER — APPOINTMENT (OUTPATIENT)
Dept: CARDIOLOGY | Facility: CLINIC | Age: 63
End: 2023-04-17

## 2023-04-18 ENCOUNTER — APPOINTMENT (OUTPATIENT)
Dept: CARDIOLOGY | Facility: CLINIC | Age: 63
End: 2023-04-18

## 2023-05-01 ENCOUNTER — RX RENEWAL (OUTPATIENT)
Age: 63
End: 2023-05-01

## 2023-05-02 ENCOUNTER — NON-APPOINTMENT (OUTPATIENT)
Age: 63
End: 2023-05-02

## 2023-05-04 LAB
ANION GAP SERPL CALC-SCNC: 12 MMOL/L
BUN SERPL-MCNC: 17 MG/DL
CALCIUM SERPL-MCNC: 9.5 MG/DL
CHLORIDE SERPL-SCNC: 101 MMOL/L
CO2 SERPL-SCNC: 24 MMOL/L
CREAT SERPL-MCNC: 1.09 MG/DL
EGFR: 77 ML/MIN/1.73M2
GLUCOSE SERPL-MCNC: 256 MG/DL
POTASSIUM SERPL-SCNC: 5.2 MMOL/L
SODIUM SERPL-SCNC: 137 MMOL/L

## 2023-05-05 LAB — NT-PROBNP SERPL-MCNC: 813 PG/ML

## 2023-05-15 ENCOUNTER — NON-APPOINTMENT (OUTPATIENT)
Age: 63
End: 2023-05-15

## 2023-05-15 RX ORDER — DAPAGLIFLOZIN 10 MG/1
10 TABLET, FILM COATED ORAL
Qty: 30 | Refills: 0 | Status: DISCONTINUED | COMMUNITY
Start: 2023-03-22 | End: 2023-05-15

## 2023-05-17 ENCOUNTER — APPOINTMENT (OUTPATIENT)
Dept: HEART AND VASCULAR | Facility: CLINIC | Age: 63
End: 2023-05-17
Payer: COMMERCIAL

## 2023-05-17 ENCOUNTER — NON-APPOINTMENT (OUTPATIENT)
Age: 63
End: 2023-05-17

## 2023-05-17 PROCEDURE — 93296 REM INTERROG EVL PM/IDS: CPT

## 2023-05-17 PROCEDURE — 93295 DEV INTERROG REMOTE 1/2/MLT: CPT

## 2023-05-24 ENCOUNTER — APPOINTMENT (OUTPATIENT)
Dept: ENDOCRINOLOGY | Facility: CLINIC | Age: 63
End: 2023-05-24
Payer: COMMERCIAL

## 2023-05-24 VITALS
WEIGHT: 201 LBS | HEIGHT: 74 IN | BODY MASS INDEX: 25.8 KG/M2 | HEART RATE: 72 BPM | DIASTOLIC BLOOD PRESSURE: 60 MMHG | SYSTOLIC BLOOD PRESSURE: 120 MMHG | OXYGEN SATURATION: 95 %

## 2023-05-24 DIAGNOSIS — I10 ESSENTIAL (PRIMARY) HYPERTENSION: ICD-10-CM

## 2023-05-24 DIAGNOSIS — E55.9 VITAMIN D DEFICIENCY, UNSPECIFIED: ICD-10-CM

## 2023-05-24 DIAGNOSIS — E53.8 DEFICIENCY OF OTHER SPECIFIED B GROUP VITAMINS: ICD-10-CM

## 2023-05-24 DIAGNOSIS — E66.3 OVERWEIGHT: ICD-10-CM

## 2023-05-24 PROCEDURE — 99205 OFFICE O/P NEW HI 60 MIN: CPT

## 2023-05-24 RX ORDER — SITAGLIPTIN 100 MG/1
100 TABLET, FILM COATED ORAL DAILY
Refills: 0 | Status: DISCONTINUED | COMMUNITY
Start: 2021-05-14 | End: 2023-05-24

## 2023-05-30 NOTE — HISTORY OF PRESENT ILLNESS
[FreeTextEntry1] : quality: dm2 \par onset 201983\par severity: moderate \par modifying factors: diet helps and exercise \par associated factors: HLD\par

## 2023-05-30 NOTE — ADDENDUM
[FreeTextEntry1] : To further explain, this patient with diabetes performs 4 glucose checks per day utilizing a home blood glucose monitor. The patient is treated with insulin via 4 injections daily.  This patient requires frequent adjustments to their insulin treatment on the basis of home blood glucose monitoring results via adjusting fixed doses of insulin. In addition, the patient has been to our office for an evaluation of their diabetes control within the past 6 months. \par \par

## 2023-05-30 NOTE — ASSESSMENT
[Exercise/Effect on Glucose] : exercise/effect on glucose [Self Monitoring of Blood Glucose] : self monitoring of blood glucose [Retinopathy Screening] : Patient was referred to ophthalmology for retinopathy screening [FreeTextEntry1] : Patient with DM2 poorly controlled and HTN, HLD, extensive CV disease with CAD s/p PCI and CABG\par and MVR, PAD  and carotid disease, Afib , CHF with EF 45%, CKD  comes to establish continuity. HypoT secondary to [place] for  periodic hyperT paralysis\par \par DM2: recent A1c 8.1\par not able to tolerate sglt2 due to severe polyuria \par cont  mg BID (he has CHF so might consider d/c?) \par stop januvia \par cont levemir 32 u BID \par start novolog 5 u TID w meals. \par he forgets to atke meds at times ( depression and dementia ??) \par i need logbook 4x day and fax me in 10 days \par check KAI, C peptide, ICA \par discussed diet and exercise\par encouraged more exercise walking 30 min 3 x week\par Needs to try to have more protein for meals\par See CDE for diet teaching\par Importance of blood glucose monitoring discussed. Targets reviewed. Recommended pt try to keep blood glucose level below 130 (110) before meals and below 160 (140) two hours after meals.\par Medication, risks and benefits reviewed.\par check microalb spot \par eye exam Nov 2023 ; has DR (Dr Candie Arita )\par cont entrasto, \par low B12: check levels. \par \par HTN:  bp at target on meds. Continue current management.\par I advised low fat/low cholesterol diet, low salt diet, and weight loss\par \par HLD: continue statin. check lipids. 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

## 2023-06-14 ENCOUNTER — APPOINTMENT (OUTPATIENT)
Dept: CARDIOLOGY | Facility: CLINIC | Age: 63
End: 2023-06-14

## 2023-06-14 ENCOUNTER — NON-APPOINTMENT (OUTPATIENT)
Age: 63
End: 2023-06-14

## 2023-06-14 NOTE — REASON FOR VISIT
[FreeTextEntry3] : No show 4/18/23\par \par Dear Dr. Garrett      . I saw your patient PORSCHE BARNEY on 06/14/2023 . Please see the note below for the assessment and plan. \par \par PORSCHE BARNEY  was seen  for an initial consultation at the [facility] at [facility] on 04/18/2023.   Mr. BARNEY was referred by Dr. Garrett  he for hereditary cardiac predisposition risk assessment and counseling, due to hx nicm\par \par

## 2023-06-14 NOTE — FAMILY HISTORY
[FreeTextEntry1] : FamilyHistory_20_twCiteListControlStart FamilyHistory_20_twCiteListControlEnd [id number] \par A four-generation family history was constructed and scanned into [employer]. \par Family history is significant for: \par siblings\par \par Mother\par Maternal aunts\par Maternal uncles\par Maternal Grandmother\par Maternal Grandfather\par \par Father\par Paternal aunts\par Paternal uncles\par Paternal Grandfather\par Paternal Grandmother\par \par children:\par \par his maternal families originate from [place] and paternal families originate from indicate country or region. \par No Ashkenazi Rastafarian ancestry. \par Family history was positive/ negative for consanguinity  \par No family history of SIDS\par  \par \par

## 2023-06-14 NOTE — HISTORY OF PRESENT ILLNESS
[FreeTextEntry1] : No show 4/18/23\par \par \par PORSCHE BARNEY is a 61 yo M PMH Afib, CAD sp CABG 1c, MV repair, KOFI clip and LAURA , DARYA to ramus, HFpEF Ef 45%, Afib on eliquis, \par hx of Vfib and  shock\par his family history is significant for heart failure.\par \par today he is referred for a cardiogenomic evaluation

## 2023-06-20 ENCOUNTER — APPOINTMENT (OUTPATIENT)
Dept: HEART FAILURE | Facility: CLINIC | Age: 63
End: 2023-06-20

## 2023-07-05 ENCOUNTER — APPOINTMENT (OUTPATIENT)
Dept: ENDOCRINOLOGY | Facility: CLINIC | Age: 63
End: 2023-07-05

## 2023-07-31 ENCOUNTER — APPOINTMENT (OUTPATIENT)
Dept: ENDOCRINOLOGY | Facility: CLINIC | Age: 63
End: 2023-07-31

## 2023-08-14 NOTE — PATIENT PROFILE ADULT - HISTORY OF COVID-19 VACCINATION
DIAGNOSIS: RT SHOULDER PAIN   APPOINTMENT DATE: 08/21/2023   NOTES STATUS DETAILS   OFFICE NOTE from referring provider SELF    OFFICE NOTE from other specialist Internal 04/04/2022 - Cheo Antony MD - [facility]    01/11/2022 - Reid Dick MD - [facility]    03/25/2021 - Xochilt Santos MD - [facility]    More..   OPERATIVE REPORT Internal 05/15/2019 - RT Shoulder Hemiarthroplasty / Distal Clavicle Excision    03/02/2016 - RT Shoulder Arthroscopy w/ Superior Labrum Anterior and Posterior Repair / Open Biceps Tenodesis Repair   MEDICATION LIST Internal    IMPLANT RECORD/STICKER Internal    LABS     IMAGING Internal      
Yes

## 2023-08-16 ENCOUNTER — NON-APPOINTMENT (OUTPATIENT)
Age: 63
End: 2023-08-16

## 2023-08-16 ENCOUNTER — APPOINTMENT (OUTPATIENT)
Dept: HEART AND VASCULAR | Facility: CLINIC | Age: 63
End: 2023-08-16
Payer: COMMERCIAL

## 2023-08-16 PROCEDURE — 93297 REM INTERROG DEV EVAL ICPMS: CPT

## 2023-08-16 PROCEDURE — 93295 DEV INTERROG REMOTE 1/2/MLT: CPT

## 2023-09-05 ENCOUNTER — APPOINTMENT (OUTPATIENT)
Dept: ENDOCRINOLOGY | Facility: CLINIC | Age: 63
End: 2023-09-05
Payer: COMMERCIAL

## 2023-09-05 ENCOUNTER — RX RENEWAL (OUTPATIENT)
Age: 63
End: 2023-09-05

## 2023-09-20 ENCOUNTER — APPOINTMENT (OUTPATIENT)
Dept: HEART FAILURE | Facility: CLINIC | Age: 63
End: 2023-09-20
Payer: COMMERCIAL

## 2023-09-20 VITALS
OXYGEN SATURATION: 96 % | SYSTOLIC BLOOD PRESSURE: 118 MMHG | DIASTOLIC BLOOD PRESSURE: 74 MMHG | BODY MASS INDEX: 26.2 KG/M2 | WEIGHT: 204.13 LBS | HEART RATE: 74 BPM | HEIGHT: 74 IN

## 2023-09-20 DIAGNOSIS — I50.30 UNSPECIFIED DIASTOLIC (CONGESTIVE) HEART FAILURE: ICD-10-CM

## 2023-09-20 PROCEDURE — 99214 OFFICE O/P EST MOD 30 MIN: CPT

## 2023-09-20 RX ORDER — FUROSEMIDE 40 MG/1
40 TABLET ORAL
Refills: 0 | Status: DISCONTINUED | COMMUNITY
End: 2023-09-20

## 2023-09-20 RX ORDER — CLOPIDOGREL BISULFATE 75 MG/1
75 TABLET, FILM COATED ORAL
Qty: 30 | Refills: 0 | Status: DISCONTINUED | COMMUNITY
Start: 2021-04-23 | End: 2023-09-20

## 2023-09-26 PROBLEM — I50.30 (HFPEF) HEART FAILURE WITH PRESERVED EJECTION FRACTION: Status: ACTIVE | Noted: 2021-11-03

## 2023-09-27 ENCOUNTER — APPOINTMENT (OUTPATIENT)
Dept: ENDOCRINOLOGY | Facility: CLINIC | Age: 63
End: 2023-09-27
Payer: COMMERCIAL

## 2023-09-27 VITALS
TEMPERATURE: 98 F | DIASTOLIC BLOOD PRESSURE: 68 MMHG | HEART RATE: 61 BPM | SYSTOLIC BLOOD PRESSURE: 120 MMHG | OXYGEN SATURATION: 98 % | HEIGHT: 74 IN | BODY MASS INDEX: 25.67 KG/M2 | WEIGHT: 200 LBS

## 2023-09-27 LAB
ALBUMIN SERPL ELPH-MCNC: 4.8 G/DL
ALP BLD-CCNC: 72 U/L
ALT SERPL-CCNC: 14 U/L
ANION GAP SERPL CALC-SCNC: 10 MMOL/L
AST SERPL-CCNC: 15 U/L
BILIRUB SERPL-MCNC: 0.4 MG/DL
BUN SERPL-MCNC: 22 MG/DL
CALCIUM SERPL-MCNC: 9.5 MG/DL
CHLORIDE SERPL-SCNC: 99 MMOL/L
CO2 SERPL-SCNC: 25 MMOL/L
CREAT SERPL-MCNC: 1.27 MG/DL
EGFR: 64 ML/MIN/1.73M2
ESTIMATED AVERAGE GLUCOSE: 189 MG/DL
GLUCOSE SERPL-MCNC: 214 MG/DL
HBA1C MFR BLD HPLC: 8.2 %
MAGNESIUM SERPL-MCNC: 1.8 MG/DL
NT-PROBNP SERPL-MCNC: 1179 PG/ML
POTASSIUM SERPL-SCNC: 5.1 MMOL/L
PROT SERPL-MCNC: 7.7 G/DL
SODIUM SERPL-SCNC: 135 MMOL/L

## 2023-09-27 PROCEDURE — 99214 OFFICE O/P EST MOD 30 MIN: CPT | Mod: 25

## 2023-09-27 PROCEDURE — 95251 CONT GLUC MNTR ANALYSIS I&R: CPT

## 2023-10-10 ENCOUNTER — NON-APPOINTMENT (OUTPATIENT)
Age: 63
End: 2023-10-10

## 2023-10-24 ENCOUNTER — NON-APPOINTMENT (OUTPATIENT)
Age: 63
End: 2023-10-24

## 2023-11-09 ENCOUNTER — NON-APPOINTMENT (OUTPATIENT)
Age: 63
End: 2023-11-09

## 2023-11-15 ENCOUNTER — APPOINTMENT (OUTPATIENT)
Dept: HEART AND VASCULAR | Facility: CLINIC | Age: 63
End: 2023-11-15
Payer: MEDICAID

## 2023-11-15 ENCOUNTER — NON-APPOINTMENT (OUTPATIENT)
Age: 63
End: 2023-11-15

## 2023-11-15 ENCOUNTER — APPOINTMENT (OUTPATIENT)
Dept: HEART FAILURE | Facility: CLINIC | Age: 63
End: 2023-11-15

## 2023-11-15 PROCEDURE — 93296 REM INTERROG EVL PM/IDS: CPT

## 2023-11-15 PROCEDURE — 93295 DEV INTERROG REMOTE 1/2/MLT: CPT

## 2023-11-28 RX ORDER — SACUBITRIL AND VALSARTAN 24; 26 MG/1; MG/1
24-26 TABLET, FILM COATED ORAL TWICE DAILY
Qty: 180 | Refills: 3 | Status: ACTIVE | COMMUNITY
Start: 2023-02-14 | End: 1900-01-01

## 2023-11-28 RX ORDER — EPLERENONE 25 MG/1
25 TABLET, COATED ORAL DAILY
Qty: 90 | Refills: 0 | Status: ACTIVE | COMMUNITY
Start: 1900-01-01 | End: 1900-01-01

## 2023-11-29 RX ORDER — FLASH GLUCOSE SENSOR
KIT MISCELLANEOUS
Qty: 2 | Refills: 2 | Status: ACTIVE | COMMUNITY
Start: 2023-11-28 | End: 1900-01-01

## 2023-12-01 NOTE — DISCHARGE NOTE NURSING/CASE MANAGEMENT/SOCIAL WORK - NSDCVIVACCINE_GEN_ALL_CORE_FT
As a follow-up, a second attempt has been made for outreach via fax to facility. Please see Contacts section for details.     Thank you  Karis Blanco No Vaccines Administered.

## 2023-12-14 RX ORDER — INSULIN ASPART 100 [IU]/ML
(70-30) 100 INJECTION, SUSPENSION SUBCUTANEOUS TWICE DAILY
Qty: 1 | Refills: 2 | Status: COMPLETED | COMMUNITY
Start: 2023-10-23 | End: 2023-12-14

## 2024-01-02 RX ORDER — LEVOTHYROXINE SODIUM 0.15 MG/1
150 TABLET ORAL DAILY
Qty: 90 | Refills: 1 | Status: ACTIVE | COMMUNITY
Start: 2021-05-29 | End: 1900-01-01

## 2024-01-03 ENCOUNTER — APPOINTMENT (OUTPATIENT)
Dept: HEART FAILURE | Facility: CLINIC | Age: 64
End: 2024-01-03
Payer: MEDICAID

## 2024-01-03 VITALS
WEIGHT: 209 LBS | OXYGEN SATURATION: 95 % | HEART RATE: 76 BPM | BODY MASS INDEX: 26.82 KG/M2 | SYSTOLIC BLOOD PRESSURE: 96 MMHG | DIASTOLIC BLOOD PRESSURE: 50 MMHG | HEIGHT: 74 IN

## 2024-01-03 DIAGNOSIS — G47.33 OBSTRUCTIVE SLEEP APNEA (ADULT) (PEDIATRIC): ICD-10-CM

## 2024-01-03 DIAGNOSIS — Z95.1 PRESENCE OF AORTOCORONARY BYPASS GRAFT: ICD-10-CM

## 2024-01-03 DIAGNOSIS — E05.00 THYROTOXICOSIS WITH DIFFUSE GOITER W/OUT THYROTOXIC CRISIS OR STORM: ICD-10-CM

## 2024-01-03 DIAGNOSIS — D86.9 SARCOIDOSIS, UNSPECIFIED: ICD-10-CM

## 2024-01-03 PROCEDURE — 99215 OFFICE O/P EST HI 40 MIN: CPT

## 2024-01-03 RX ORDER — FUROSEMIDE 20 MG/1
20 TABLET ORAL
Qty: 90 | Refills: 0 | Status: DISCONTINUED | COMMUNITY
Start: 2023-09-20 | End: 2024-01-03

## 2024-01-03 RX ORDER — METFORMIN HYDROCHLORIDE 500 MG/1
500 TABLET, COATED ORAL TWICE DAILY
Qty: 180 | Refills: 0 | Status: DISCONTINUED | COMMUNITY
End: 2024-01-03

## 2024-01-03 NOTE — REVIEW OF SYSTEMS
[FreeTextEntry1] : The remaining 14-point ROS is otherwise negative or non contributory except as noted in the HPI.

## 2024-01-03 NOTE — REASON FOR VISIT
[Cardiac Failure] : cardiac failure [FreeTextEntry1] : HF: Dr. Kirkland Primary Cards: Dr. Negrete  EP: Dr. Hutchinson (previously seen Sherine Lofton/Tami) Endocrine: Dr. Love  Vascular: Dr. Corona

## 2024-01-03 NOTE — PHYSICAL EXAM
[Well Developed] : well developed [No Acute Distress] : no acute distress [Normal Conjunctiva] : normal conjunctiva [Normal Venous Pressure] : normal venous pressure [Normal S1, S2] : normal S1, S2 [Clear Lung Fields] : clear lung fields [No Respiratory Distress] : no respiratory distress  [Soft] : abdomen soft [No Edema] : no edema [Moves all extremities] : moves all extremities [Alert and Oriented] : alert and oriented [de-identified] : venous stasis discoloration noted bilaterally  [de-identified] : warm periphearlly

## 2024-01-03 NOTE — HISTORY OF PRESENT ILLNESS
[FreeTextEntry1] : Mr. Jimenez is a 62 y/o M with PMH of CAD s/p 1v CABG with MV repair, KOFI clip and MAZE (2003), s/p DARYA to ramus (4/2021), HFmrEF (LVEF 40-45%), AFib (on Eliquis), chronotropic incompetence, s/p dual chamber ICD (given suspected sarcoidosis with LV dysfunction, Dr. Aguirre at [place] 8/2021), VF s/p ICD shock, who presents today for follow-up of his HF. His other comorbidities include IDDM2 (A1C 8.2%), Grave's disease s/p radioactive iodine with subsequent hypothyroidism, untreated MAEGAN, COPD, PAD s/p leg stent and s/p right carotid endarterectomy.  He underwent EP study which did not show AFib but noted extensive scarring and chronotropic incompetence. Cardiac MRI done in 8/2021 showed biventricular systolic dysfunction with patchy epicardial and endocardial scar, concerning for sarcoidosis. Given above findings he underwent placement of dual chamber ICD in 8/2021.  2/10/23: He was hospitalized 8/5-8/7 at [facility] for ADHF. Of note, 1 year ago he had an episode of Vfib and was shocked but he did not even realize it.  He has not really seen a cardiologist in about a year due to insurance reasons and his care was based at [place] and it was difficult for him to get there regularly. He states that was doing fine until recently and that recently he feels like he has been getting SOB with minimal exertion. He denies any recent ICD shocks or heart failure admissions. He denies orthopnea, PND, CP, palpitations, lightheadedness, dizziness, abdominal bloating and LE edema.   3/8/23: Since his last visit he has established with EP Dr. Hutchinson and his device rate was lowered to 60bpm.  He reports feeling well and denies overt HF symptoms. Reports that he has not been taking furosemide because his has such good UOP without it. He frequents the gym a couple days per week and walks on the treadmill for ~1/8 mile then stops due to leg stiffness but does not feel limited from a cardiovascular standpoint. Specifically denies CP, palpitations, SOB, dizziness/LH, edema, orthopnea and PND.   9/20/23: Patient presents for routine follow-up. He reports occasional OBRIEN which is unchanged for him. He denies any CP, palpitations, LH/dizziness, orthopnea, PND, cough, abdominal discomfort, or LE edema. Patient denies any recent ICD shocks. No recent hospitalizations or visits to the ED. Weight has been stable between 202-205 lbs. Of note he was recently laid off from his job and will not have insurance after the end of this month.   1/3/24: Patient presents for routine follow-up. He reports feeling generally well from a cardiovascular standpoint and denies overt HF symptoms. He does endorse some "crappy" eating habits which he has been working to improve. He has been a bit more tired which he believes is because he ran out of levothyroxine. He has been trying to contact the endocrinology office for a refill. He also notes that he has been using a testosterone patch prescribed by urology. He currently denies any CP, palpitations, SOB, LE edema, orthopnea, syncope or near syncope. He tells me he is applying for disability because he is no longer able to work given his multiple comorbidities, inability to perform as he used to/medication side effects including frequent urination and fatigue.

## 2024-01-03 NOTE — ASSESSMENT
[FreeTextEntry1] : Mr. Jimenez is a 62 y/o M with PMH of CAD s/p 1v CABG with MV repair, KOFI clip and MAZE (2003), s/p DARYA to ramus (4/2021), HFmrEF (LVEF 40-45%), AFib (on Eliquis), chronotropic incompetence, s/p dual chamber ICD (given suspected sarcoidosis with LV dysfunction, Dr. Aguirre at [place] 8/2021), VF s/p ICD shock, who presents today for follow-up of his HF. His other comorbidities include IDDM2 (A1C 8.2%), Grave's disease s/p radioactive iodine with subsequent hypothyroidism, untreated MAEGAN, COPD, PAD s/p leg stent and s/p right carotid endarterectomy.  # HFmrEF -There was a concern for sarcoid on cardiac MRI and h/o VT. PET/CT ordered. -GDMT: Continue Toprol-XL 25mg nightly, Entresto 24-26mg BID and Eplerenone 25mg daily. Will retrial Jardiance (previously c/o polyuria) and stop standing lasix.  -Labs: Repeat labwork ordered  -Device: ICD, continue to follow-up with Dr. Hutchinson. Last shock 4/2022. Interrogation 11/15/23 AT/AF <10min. -Advanced Therapies: CPET for risk stratification once GDMT optimized -MAEGAN: Pt states he has a mask but does not use it. Educated regarding importance of sleep apnea treatment. Referred back to Pulmonology for f/u. -Social History: Is  from his wife, denies smoking, social alcohol use. Smokes marijuana for sleep. -HF lifestyle modifications including daily weights, BP log, and low sodium diet reiterated. Pt given daily weight and BP log and verbalized understanding to notify office if >3lb/1 day or >5 lb/1 week weight gain -Previously referred to genetic specialist, his mother and sister both have heart failure, but missed his appointment. Will refer again.  # CAD s/p CABG - Stable without s/s of ischemia. He was taken off antiplatelet therapy following admission for GIB at [place]. -C/w statin, BB  -Will have him followup with his gen cards or have him reestablish care with our group. He is going to reach out to see if Dr. Negrete takes his insurance.   # pAF -BB as above -AC: Eliquis 5mg BID  # PAD s/p LE stent and right CEA -No symptoms currently but encouraged him to follow -up with vascular (Dr. Carrillo).  # DM2 - Not ideally controlled with A1C 8.2%.  -Reports he was taken off metformin and is currently on insulin only. -Retrial SGLT2i as above (prescribed at HF dose to start). -F/u with endocrinologist Dr. Love

## 2024-01-03 NOTE — CARDIOLOGY SUMMARY
[de-identified] : 3/1/23:  8/6/21: atrial paced HR 82 [de-identified] : 3/7/23 TTE: LVEF 40-45%, LVIDd 5.38cm, mild LVH, mild MR, mild-mod TR, mod PH (PASP 52 mmHg).  5/14/21 TTE: LVEF 45%, LVIDd 5.4cm, LA 4.8cm, septum and PWT 1.2cm, RVE with RVSD, annuloplasty ring in mitral position, mod MR, mean transmitral valve gradient 7 mm Hg, mod-severe TR, est RVSP 53 mm Hg [de-identified] : 8/04/21 cMRI: LVEF 47% (global), LV edvi 92, RVE with RVSD, s/p MV annuloplasty, mild MR, severe TR, mid myocardial scar, basal-mid anteroseptal, focal mid myocardial enhancement, mid-anterolateral epicardial mid-apical and lateral consistent with NICM and possible sarcoid [de-identified] : 8/2021: DC-ICD [de-identified] : \par  04/22/21 LHC: LM normal, pLAD 40%, D1 95%, supplied by patent bypass graft, Cx minor luminal irregularities, RCA mild luminal irregularities, Mckay 80% s/p DARYA, Ao 162/70/87, LVEDP 30

## 2024-01-18 ENCOUNTER — NON-APPOINTMENT (OUTPATIENT)
Age: 64
End: 2024-01-18

## 2024-01-18 DIAGNOSIS — R06.02 SHORTNESS OF BREATH: ICD-10-CM

## 2024-01-18 LAB
ALBUMIN SERPL ELPH-MCNC: 4.7 G/DL
ALP BLD-CCNC: 76 U/L
ALT SERPL-CCNC: 18 U/L
ANION GAP SERPL CALC-SCNC: 11 MMOL/L
AST SERPL-CCNC: 20 U/L
BILIRUB SERPL-MCNC: 0.4 MG/DL
BUN SERPL-MCNC: 33 MG/DL
CALCIUM SERPL-MCNC: 9.3 MG/DL
CHLORIDE SERPL-SCNC: 101 MMOL/L
CO2 SERPL-SCNC: 24 MMOL/L
CREAT SERPL-MCNC: 1.4 MG/DL
EGFR: 57 ML/MIN/1.73M2
GLUCOSE SERPL-MCNC: 209 MG/DL
MAGNESIUM SERPL-MCNC: 2.2 MG/DL
NT-PROBNP SERPL-MCNC: 472 PG/ML
POTASSIUM SERPL-SCNC: 5.1 MMOL/L
PROT SERPL-MCNC: 8 G/DL
SODIUM SERPL-SCNC: 136 MMOL/L

## 2024-01-24 ENCOUNTER — APPOINTMENT (OUTPATIENT)
Dept: ENDOCRINOLOGY | Facility: CLINIC | Age: 64
End: 2024-01-24
Payer: MEDICAID

## 2024-01-24 ENCOUNTER — NON-APPOINTMENT (OUTPATIENT)
Age: 64
End: 2024-01-24

## 2024-01-24 VITALS
TEMPERATURE: 98 F | DIASTOLIC BLOOD PRESSURE: 60 MMHG | HEART RATE: 80 BPM | HEIGHT: 74 IN | OXYGEN SATURATION: 98 % | SYSTOLIC BLOOD PRESSURE: 100 MMHG | RESPIRATION RATE: 16 BRPM | WEIGHT: 212 LBS | BODY MASS INDEX: 27.21 KG/M2

## 2024-01-24 DIAGNOSIS — E78.5 HYPERLIPIDEMIA, UNSPECIFIED: ICD-10-CM

## 2024-01-24 DIAGNOSIS — E03.9 HYPOTHYROIDISM, UNSPECIFIED: ICD-10-CM

## 2024-01-24 DIAGNOSIS — E11.65 TYPE 2 DIABETES MELLITUS WITH HYPERGLYCEMIA: ICD-10-CM

## 2024-01-24 DIAGNOSIS — I10 ESSENTIAL (PRIMARY) HYPERTENSION: ICD-10-CM

## 2024-01-24 LAB — NT-PROBNP SERPL-MCNC: 498 PG/ML

## 2024-01-24 PROCEDURE — 99215 OFFICE O/P EST HI 40 MIN: CPT

## 2024-01-24 PROCEDURE — 95251 CONT GLUC MNTR ANALYSIS I&R: CPT

## 2024-01-24 RX ORDER — INSULIN GLARGINE 100 [IU]/ML
100 INJECTION, SOLUTION SUBCUTANEOUS
Qty: 21 | Refills: 1 | Status: ACTIVE | COMMUNITY
Start: 2024-01-24 | End: 1900-01-01

## 2024-01-24 RX ORDER — INSULIN ASPART 100 [IU]/ML
100 INJECTION, SOLUTION INTRAVENOUS; SUBCUTANEOUS
Qty: 4 | Refills: 2 | Status: ACTIVE | COMMUNITY
Start: 2023-08-07 | End: 1900-01-01

## 2024-01-24 NOTE — ASSESSMENT
[FreeTextEntry1] : Patient with DM2 poorly controlled and HTN, HLD, extensive CV disease with CAD s/p PCI and CABG and MVR, PAD and carotid disease, Afib , CHF with EF 45%, CKD comes to establish continuity. HypoT secondary to GENAO for periodic hyperT paralysis. Losing insurance in 3 days. Forgets to take meds at times ( depression and dementia ??). Going on disability soon. No labs .  Felt much better since stopped MF and januvia.   DM2: GMI 8.7. Gained  9 lbs . Not doing very well with mix insulins and will go back to basal -bolus.  cgm report revised and d/w pt. Target Bgs 28% and high 37% and very high 35 %  not able to tolerate sglt2 due to severe polyuria, no MF due to CHF restart Lantus 32 u BID  restart  novolog 5 - 7 u TID w meals. he forgets to take meds at times: referral to neuropsychologist ?. Forgets medication , doses  check KAI, C peptide, ICA encouraged more exercise walking 30 min 3 x week check microalb spot eye exam Nov 2023 ; has DR (Dr Candie Arita) low B12: check levels. advised to set up alarms on his cell for insulin doses . I suspect memory impairment / vascular dementia ? Will refer to neuropsychologist for evaluation.   HTN: bp at target on meds. Continue current management.  HLD: continue statin. check lipids.  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 Not sure he wants to continue with it

## 2024-01-25 RX ORDER — BLOOD-GLUCOSE SENSOR
EACH MISCELLANEOUS
Qty: 3 | Refills: 5 | Status: ACTIVE | COMMUNITY
Start: 2024-01-25 | End: 1900-01-01

## 2024-01-25 RX ORDER — BLOOD-GLUCOSE,RECEIVER,CONT
EACH MISCELLANEOUS
Qty: 1 | Refills: 0 | Status: ACTIVE | COMMUNITY
Start: 2024-01-25 | End: 1900-01-01

## 2024-01-30 LAB
ANION GAP SERPL CALC-SCNC: 12 MMOL/L
BUN SERPL-MCNC: 29 MG/DL
CALCIUM SERPL-MCNC: 9.5 MG/DL
CHLORIDE SERPL-SCNC: 101 MMOL/L
CO2 SERPL-SCNC: 24 MMOL/L
CREAT SERPL-MCNC: 1.56 MG/DL
EGFR: 50 ML/MIN/1.73M2
GLUCOSE SERPL-MCNC: 335 MG/DL
POTASSIUM SERPL-SCNC: 5.4 MMOL/L
SODIUM SERPL-SCNC: 137 MMOL/L

## 2024-01-31 ENCOUNTER — RX RENEWAL (OUTPATIENT)
Age: 64
End: 2024-01-31

## 2024-01-31 RX ORDER — APIXABAN 5 MG/1
5 TABLET, FILM COATED ORAL
Qty: 180 | Refills: 0 | Status: ACTIVE | COMMUNITY
Start: 2024-01-31 | End: 1900-01-01

## 2024-02-07 DIAGNOSIS — I42.8 OTHER CARDIOMYOPATHIES: ICD-10-CM

## 2024-02-07 DIAGNOSIS — N18.2 CHRONIC KIDNEY DISEASE, STAGE 2 (MILD): ICD-10-CM

## 2024-02-07 LAB
ANION GAP SERPL CALC-SCNC: 12 MMOL/L
BUN SERPL-MCNC: 33 MG/DL
CALCIUM SERPL-MCNC: 9.7 MG/DL
CHLORIDE SERPL-SCNC: 101 MMOL/L
CO2 SERPL-SCNC: 27 MMOL/L
CREAT SERPL-MCNC: 1.59 MG/DL
EGFR: 49 ML/MIN/1.73M2
GLUCOSE SERPL-MCNC: 122 MG/DL
POTASSIUM SERPL-SCNC: 5 MMOL/L
SODIUM SERPL-SCNC: 140 MMOL/L

## 2024-02-15 ENCOUNTER — NON-APPOINTMENT (OUTPATIENT)
Age: 64
End: 2024-02-15

## 2024-02-16 ENCOUNTER — APPOINTMENT (OUTPATIENT)
Dept: HEART AND VASCULAR | Facility: CLINIC | Age: 64
End: 2024-02-16
Payer: MEDICAID

## 2024-02-16 PROCEDURE — 93295 DEV INTERROG REMOTE 1/2/MLT: CPT

## 2024-02-16 PROCEDURE — 93296 REM INTERROG EVL PM/IDS: CPT

## 2024-02-29 ENCOUNTER — NON-APPOINTMENT (OUTPATIENT)
Age: 64
End: 2024-02-29

## 2024-03-04 ENCOUNTER — NON-APPOINTMENT (OUTPATIENT)
Age: 64
End: 2024-03-04

## 2024-03-04 RX ORDER — EMPAGLIFLOZIN 10 MG/1
10 TABLET, FILM COATED ORAL DAILY
Qty: 30 | Refills: 1 | Status: DISCONTINUED | COMMUNITY
Start: 2024-01-03 | End: 2024-03-04

## 2024-03-04 RX ORDER — FUROSEMIDE 40 MG/1
40 TABLET ORAL
Refills: 0 | Status: ACTIVE | COMMUNITY

## 2024-03-06 ENCOUNTER — APPOINTMENT (OUTPATIENT)
Dept: PEDIATRIC CARDIOLOGY | Facility: CLINIC | Age: 64
End: 2024-03-06

## 2024-03-26 RX ORDER — PEN NEEDLE, DIABETIC 32 GX 1/4"
32G X 6 MM NEEDLE, DISPOSABLE MISCELLANEOUS
Qty: 400 | Refills: 0 | Status: ACTIVE | COMMUNITY
Start: 2024-03-26 | End: 1900-01-01

## 2024-04-03 ENCOUNTER — APPOINTMENT (OUTPATIENT)
Dept: HEART FAILURE | Facility: CLINIC | Age: 64
End: 2024-04-03
Payer: MEDICAID

## 2024-04-03 VITALS
SYSTOLIC BLOOD PRESSURE: 118 MMHG | HEART RATE: 87 BPM | OXYGEN SATURATION: 90 % | WEIGHT: 228 LBS | BODY MASS INDEX: 29.26 KG/M2 | HEIGHT: 74 IN | DIASTOLIC BLOOD PRESSURE: 64 MMHG

## 2024-04-03 DIAGNOSIS — I48.20 CHRONIC ATRIAL FIBRILLATION, UNSP: ICD-10-CM

## 2024-04-03 DIAGNOSIS — I50.22 CHRONIC SYSTOLIC (CONGESTIVE) HEART FAILURE: ICD-10-CM

## 2024-04-03 DIAGNOSIS — I25.10 ATHEROSCLEROTIC HEART DISEASE OF NATIVE CORONARY ARTERY W/OUT ANGINA PECTORIS: ICD-10-CM

## 2024-04-03 PROCEDURE — 99214 OFFICE O/P EST MOD 30 MIN: CPT

## 2024-04-03 RX ORDER — METOPROLOL SUCCINATE 25 MG/1
25 TABLET, EXTENDED RELEASE ORAL
Qty: 90 | Refills: 0 | Status: DISCONTINUED | COMMUNITY
Start: 2023-03-07 | End: 2024-04-03

## 2024-04-03 RX ORDER — INSULIN GLARGINE 100 [IU]/ML
100 INJECTION, SOLUTION SUBCUTANEOUS
Qty: 2 | Refills: 1 | Status: DISCONTINUED | COMMUNITY
Start: 2023-12-14 | End: 2024-04-03

## 2024-04-05 NOTE — REASON FOR VISIT
[FreeTextEntry1] : HF: Dr. Kirkland Primary Cards: Dr. Negrete  EP: Dr. Hutchinson (previously seen Sherine Lofton/Tami) Endocrine: Dr. Love  Vascular: Dr. Corona

## 2024-04-05 NOTE — CARDIOLOGY SUMMARY
[de-identified] : 3/1/23:  8/6/21: atrial paced HR 82 [de-identified] : 8/04/21 cMRI: LVEF 47% (global), LV edvi 92, RVE with RVSD, s/p MV annuloplasty, mild MR, severe TR, mid myocardial scar, basal-mid anteroseptal, focal mid myocardial enhancement, mid-anterolateral epicardial mid-apical and lateral consistent with NICM and possible sarcoid [de-identified] : 3/7/23 TTE: LVEF 40-45%, LVIDd 5.38cm, mild LVH, mild MR, mild-mod TR, mod PH (PASP 52 mmHg).  5/14/21 TTE: LVEF 45%, LVIDd 5.4cm, LA 4.8cm, septum and PWT 1.2cm, RVE with RVSD, annuloplasty ring in mitral position, mod MR, mean transmitral valve gradient 7 mm Hg, mod-severe TR, est RVSP 53 mm Hg [de-identified] : 8/2021: DC-ICD [de-identified] : \par  04/22/21 LHC: LM normal, pLAD 40%, D1 95%, supplied by patent bypass graft, Cx minor luminal irregularities, RCA mild luminal irregularities, Mckay 80% s/p DARYA, Ao 162/70/87, LVEDP 30

## 2024-04-05 NOTE — ASSESSMENT
[FreeTextEntry1] : Mr. Jimenez is a 64 y/o M with PMH of CAD s/p 1v CABG with MV repair, KOFI clip and MAZE (2003), s/p DARYA to ramus (4/2021), HFmrEF (LVEF 40-45%), AFib (on Eliquis), chronotropic incompetence, s/p dual chamber ICD (given suspected sarcoidosis with LV dysfunction, Dr. Aguirre at [place] 8/2021), VF s/p ICD shock, who presents today for follow-up of his HF. His other comorbidities include IDDM2 (A1C 8.2%), Grave's disease s/p radioactive iodine with subsequent hypothyroidism, untreated MAEGAN, COPD, PAD s/p leg stent and s/p right carotid endarterectomy.  # HFmrEF -There was a concern for sarcoid on cardiac MRI and h/o VT. Will reorder PET on next visit, it was cancelled due to indirection in the diet.  Today on exam he is actively wheezing and his abdomen is distended with what appears to be ascites all of which is worrisome for ADHF. I have instructed him to to the hospital for further evaluation and treatment. He states he will be going to [facility] -GDMT: Continue Toprol-XL 25mg nightly, Entresto 24-26mg BID and Eplerenone 25mg daily. Will retrial Jardiance on the next visit -Labs: Repeat labwork ordered  -Device: ICD, continue to follow-up with Dr. Hutchinson. Last shock 4/2022. Interrogation 11/15/23 AT/AF <10min. -Advanced Therapies: CPET for risk stratification once GDMT optimized -MAEGAN: Pt states he has a mask but does not use it. Educated regarding importance of sleep apnea treatment. Referred back to Pulmonology for f/u. -Social History: Is  from his wife, denies smoking, social alcohol use. Smokes marijuana for sleep. -HF lifestyle modifications including daily weights, BP log, and low sodium diet reiterated. Pt given daily weight and BP log and verbalized understanding to notify office if >3lb/1 day or >5 lb/1 week weight gain   # CAD s/p CABG - Stable without s/s of ischemia. He was taken off antiplatelet therapy following admission for GIB at [place]. -C/w statin, BB   # pAF -BB as above -AC: Eliquis 5mg BID  # PAD s/p LE stent and right CEA -No symptoms currently but encouraged him to follow -up with vascular (Dr. Carrillo).  # DM2 - Not ideally controlled with A1C 8.2%.  -F/u with endocrinologist Dr. Love - Will retrial jardiance on the next visit

## 2024-04-05 NOTE — PHYSICAL EXAM
[Well Developed] : well developed [No Acute Distress] : no acute distress [Normal Conjunctiva] : normal conjunctiva [Normal Venous Pressure] : normal venous pressure [Normal S1, S2] : normal S1, S2 [Clear Lung Fields] : clear lung fields [No Respiratory Distress] : no respiratory distress  [Soft] : abdomen soft [No Edema] : no edema [Moves all extremities] : moves all extremities [Alert and Oriented] : alert and oriented [de-identified] : bilaterally wheezing [de-identified] : distended and tense [de-identified] : venous stasis discoloration noted bilaterally  [de-identified] : warm periphearlly

## 2024-04-05 NOTE — HISTORY OF PRESENT ILLNESS
[FreeTextEntry1] : Mr. Jimenez is a 64 y/o M with PMH of CAD s/p 1v CABG with MV repair, KOFI clip and MAZE (2003), s/p DARYA to ramus (4/2021), HFmrEF (LVEF 40-45%), AFib (on Eliquis), chronotropic incompetence, s/p dual chamber ICD (given suspected sarcoidosis with LV dysfunction, Dr. Aguirre at [place] 8/2021), VF s/p ICD shock, who presents today for follow-up of his HF. His other comorbidities include IDDM2 (A1C 8.2%), Grave's disease s/p radioactive iodine with subsequent hypothyroidism, untreated MAEGAN, COPD, PAD s/p leg stent and s/p right carotid endarterectomy.  He underwent EP study which did not show AFib but noted extensive scarring and chronotropic incompetence. Cardiac MRI done in 8/2021 showed biventricular systolic dysfunction with patchy epicardial and endocardial scar, concerning for sarcoidosis. Given above findings he underwent placement of dual chamber ICD in 8/2021.  2/10/23: He was hospitalized 8/5-8/7 at [facility] for ADHF. Of note, 1 year ago he had an episode of Vfib and was shocked but he did not even realize it.  He has not really seen a cardiologist in about a year due to insurance reasons and his care was based at [place] and it was difficult for him to get there regularly. He states that was doing fine until recently and that recently he feels like he has been getting SOB with minimal exertion. He denies any recent ICD shocks or heart failure admissions. He denies orthopnea, PND, CP, palpitations, lightheadedness, dizziness, abdominal bloating and LE edema.   3/8/23: Since his last visit he has established with EP Dr. Hutchinson and his device rate was lowered to 60bpm.  He reports feeling well and denies overt HF symptoms. Reports that he has not been taking furosemide because his has such good UOP without it. He frequents the gym a couple days per week and walks on the treadmill for ~1/8 mile then stops due to leg stiffness but does not feel limited from a cardiovascular standpoint. Specifically denies CP, palpitations, SOB, dizziness/LH, edema, orthopnea and PND.   9/20/23: Patient presents for routine follow-up. He reports occasional OBRIEN which is unchanged for him. He denies any CP, palpitations, LH/dizziness, orthopnea, PND, cough, abdominal discomfort, or LE edema. Patient denies any recent ICD shocks. No recent hospitalizations or visits to the ED. Weight has been stable between 202-205 lbs. Of note he was recently laid off from his job and will not have insurance after the end of this month.   1/3/24: Patient presents for routine follow-up. He reports feeling generally well from a cardiovascular standpoint and denies overt HF symptoms. He does endorse some "crappy" eating habits which he has been working to improve. He has been a bit more tired which he believes is because he ran out of levothyroxine. He has been trying to contact the endocrinology office for a refill. He also notes that he has been using a testosterone patch prescribed by urology. He currently denies any CP, palpitations, SOB, LE edema, orthopnea, syncope or near syncope. He tells me he is applying for disability because he is no longer able to work given his multiple comorbidities, inability to perform as he used to/medication side effects including frequent urination and fatigue.   4/3/24: Patient comes in for followup. Overall is feeling ok but his abdomen was very distended. He states he has difficulty sitting down and feels like he cannot take a deep breath in.

## 2024-04-12 ENCOUNTER — APPOINTMENT (OUTPATIENT)
Dept: CARDIOLOGY | Facility: CLINIC | Age: 64
End: 2024-04-12

## 2024-04-15 ENCOUNTER — NON-APPOINTMENT (OUTPATIENT)
Age: 64
End: 2024-04-15

## 2024-04-19 ENCOUNTER — RESULT REVIEW (OUTPATIENT)
Age: 64
End: 2024-04-19

## 2024-04-19 ENCOUNTER — INPATIENT (INPATIENT)
Facility: HOSPITAL | Age: 64
LOS: 5 days | Discharge: HOME HEALTH | DRG: 293 | End: 2024-04-25
Attending: FAMILY MEDICINE | Admitting: STUDENT IN AN ORGANIZED HEALTH CARE EDUCATION/TRAINING PROGRAM
Payer: MEDICAID

## 2024-04-19 VITALS
WEIGHT: 242.07 LBS | OXYGEN SATURATION: 95 % | HEART RATE: 98 BPM | SYSTOLIC BLOOD PRESSURE: 136 MMHG | TEMPERATURE: 98 F | RESPIRATION RATE: 22 BRPM | DIASTOLIC BLOOD PRESSURE: 71 MMHG | HEIGHT: 74 IN

## 2024-04-19 DIAGNOSIS — Z87.19 PERSONAL HISTORY OF OTHER DISEASES OF THE DIGESTIVE SYSTEM: Chronic | ICD-10-CM

## 2024-04-19 DIAGNOSIS — Z95.1 PRESENCE OF AORTOCORONARY BYPASS GRAFT: Chronic | ICD-10-CM

## 2024-04-19 DIAGNOSIS — I25.10 ATHEROSCLEROTIC HEART DISEASE OF NATIVE CORONARY ARTERY WITHOUT ANGINA PECTORIS: ICD-10-CM

## 2024-04-19 DIAGNOSIS — I50.21 ACUTE SYSTOLIC (CONGESTIVE) HEART FAILURE: ICD-10-CM

## 2024-04-19 DIAGNOSIS — I48.91 UNSPECIFIED ATRIAL FIBRILLATION: ICD-10-CM

## 2024-04-19 DIAGNOSIS — Z98.890 OTHER SPECIFIED POSTPROCEDURAL STATES: Chronic | ICD-10-CM

## 2024-04-19 LAB
ALBUMIN SERPL ELPH-MCNC: 3.8 G/DL — SIGNIFICANT CHANGE UP (ref 3.3–5.2)
ALP SERPL-CCNC: 78 U/L — SIGNIFICANT CHANGE UP (ref 40–120)
ALT FLD-CCNC: 20 U/L — SIGNIFICANT CHANGE UP
ANION GAP SERPL CALC-SCNC: 12 MMOL/L — SIGNIFICANT CHANGE UP (ref 5–17)
AST SERPL-CCNC: 25 U/L — SIGNIFICANT CHANGE UP
BASOPHILS # BLD AUTO: 0.05 K/UL — SIGNIFICANT CHANGE UP (ref 0–0.2)
BASOPHILS NFR BLD AUTO: 0.7 % — SIGNIFICANT CHANGE UP (ref 0–2)
BILIRUB SERPL-MCNC: 0.4 MG/DL — SIGNIFICANT CHANGE UP (ref 0.4–2)
BUN SERPL-MCNC: 37.9 MG/DL — HIGH (ref 8–20)
CALCIUM SERPL-MCNC: 8.9 MG/DL — SIGNIFICANT CHANGE UP (ref 8.4–10.5)
CHLORIDE SERPL-SCNC: 104 MMOL/L — SIGNIFICANT CHANGE UP (ref 96–108)
CO2 SERPL-SCNC: 24 MMOL/L — SIGNIFICANT CHANGE UP (ref 22–29)
CREAT SERPL-MCNC: 1.52 MG/DL — HIGH (ref 0.5–1.3)
EGFR: 51 ML/MIN/1.73M2 — LOW
EOSINOPHIL # BLD AUTO: 0.22 K/UL — SIGNIFICANT CHANGE UP (ref 0–0.5)
EOSINOPHIL NFR BLD AUTO: 3 % — SIGNIFICANT CHANGE UP (ref 0–6)
GLUCOSE BLDC GLUCOMTR-MCNC: 151 MG/DL — HIGH (ref 70–99)
GLUCOSE SERPL-MCNC: 141 MG/DL — HIGH (ref 70–99)
HCT VFR BLD CALC: 36.5 % — LOW (ref 39–50)
HGB BLD-MCNC: 11.5 G/DL — LOW (ref 13–17)
IMM GRANULOCYTES NFR BLD AUTO: 1 % — HIGH (ref 0–0.9)
LYMPHOCYTES # BLD AUTO: 0.69 K/UL — LOW (ref 1–3.3)
LYMPHOCYTES # BLD AUTO: 9.5 % — LOW (ref 13–44)
MCHC RBC-ENTMCNC: 28.8 PG — SIGNIFICANT CHANGE UP (ref 27–34)
MCHC RBC-ENTMCNC: 31.5 GM/DL — LOW (ref 32–36)
MCV RBC AUTO: 91.3 FL — SIGNIFICANT CHANGE UP (ref 80–100)
MONOCYTES # BLD AUTO: 0.84 K/UL — SIGNIFICANT CHANGE UP (ref 0–0.9)
MONOCYTES NFR BLD AUTO: 11.5 % — SIGNIFICANT CHANGE UP (ref 2–14)
NEUTROPHILS # BLD AUTO: 5.41 K/UL — SIGNIFICANT CHANGE UP (ref 1.8–7.4)
NEUTROPHILS NFR BLD AUTO: 74.3 % — SIGNIFICANT CHANGE UP (ref 43–77)
NT-PROBNP SERPL-SCNC: 1072 PG/ML — HIGH (ref 0–300)
PLATELET # BLD AUTO: 164 K/UL — SIGNIFICANT CHANGE UP (ref 150–400)
POTASSIUM SERPL-MCNC: 5 MMOL/L — SIGNIFICANT CHANGE UP (ref 3.5–5.3)
POTASSIUM SERPL-SCNC: 5 MMOL/L — SIGNIFICANT CHANGE UP (ref 3.5–5.3)
PROT SERPL-MCNC: 7.5 G/DL — SIGNIFICANT CHANGE UP (ref 6.6–8.7)
RBC # BLD: 4 M/UL — LOW (ref 4.2–5.8)
RBC # FLD: 14.1 % — SIGNIFICANT CHANGE UP (ref 10.3–14.5)
SODIUM SERPL-SCNC: 140 MMOL/L — SIGNIFICANT CHANGE UP (ref 135–145)
TROPONIN T, HIGH SENSITIVITY RESULT: 28 NG/L — SIGNIFICANT CHANGE UP (ref 0–51)
TROPONIN T, HIGH SENSITIVITY RESULT: 28 NG/L — SIGNIFICANT CHANGE UP (ref 0–51)
TROPONIN T, HIGH SENSITIVITY RESULT: 29 NG/L — SIGNIFICANT CHANGE UP (ref 0–51)
WBC # BLD: 7.28 K/UL — SIGNIFICANT CHANGE UP (ref 3.8–10.5)
WBC # FLD AUTO: 7.28 K/UL — SIGNIFICANT CHANGE UP (ref 3.8–10.5)

## 2024-04-19 PROCEDURE — 71045 X-RAY EXAM CHEST 1 VIEW: CPT | Mod: 26

## 2024-04-19 PROCEDURE — 99223 1ST HOSP IP/OBS HIGH 75: CPT

## 2024-04-19 RX ORDER — LEVOTHYROXINE SODIUM 125 MCG
150 TABLET ORAL DAILY
Refills: 0 | Status: DISCONTINUED | OUTPATIENT
Start: 2024-04-19 | End: 2024-04-25

## 2024-04-19 RX ORDER — AMLODIPINE BESYLATE 2.5 MG/1
10 TABLET ORAL DAILY
Refills: 0 | Status: DISCONTINUED | OUTPATIENT
Start: 2024-04-19 | End: 2024-04-21

## 2024-04-19 RX ORDER — INSULIN LISPRO 100/ML
VIAL (ML) SUBCUTANEOUS
Refills: 0 | Status: DISCONTINUED | OUTPATIENT
Start: 2024-04-19 | End: 2024-04-22

## 2024-04-19 RX ORDER — APIXABAN 2.5 MG/1
5 TABLET, FILM COATED ORAL EVERY 12 HOURS
Refills: 0 | Status: DISCONTINUED | OUTPATIENT
Start: 2024-04-19 | End: 2024-04-22

## 2024-04-19 RX ORDER — FUROSEMIDE 40 MG
80 TABLET ORAL ONCE
Refills: 0 | Status: COMPLETED | OUTPATIENT
Start: 2024-04-19 | End: 2024-04-19

## 2024-04-19 RX ORDER — ATORVASTATIN CALCIUM 80 MG/1
40 TABLET, FILM COATED ORAL AT BEDTIME
Refills: 0 | Status: DISCONTINUED | OUTPATIENT
Start: 2024-04-19 | End: 2024-04-25

## 2024-04-19 RX ORDER — SACUBITRIL AND VALSARTAN 24; 26 MG/1; MG/1
1 TABLET, FILM COATED ORAL
Refills: 0 | Status: DISCONTINUED | OUTPATIENT
Start: 2024-04-19 | End: 2024-04-23

## 2024-04-19 RX ORDER — INSULIN LISPRO 100/ML
VIAL (ML) SUBCUTANEOUS AT BEDTIME
Refills: 0 | Status: DISCONTINUED | OUTPATIENT
Start: 2024-04-19 | End: 2024-04-22

## 2024-04-19 RX ORDER — FUROSEMIDE 40 MG
40 TABLET ORAL
Refills: 0 | Status: DISCONTINUED | OUTPATIENT
Start: 2024-04-20 | End: 2024-04-24

## 2024-04-19 RX ORDER — DEXTROSE 10 % IN WATER 10 %
125 INTRAVENOUS SOLUTION INTRAVENOUS ONCE
Refills: 0 | Status: DISCONTINUED | OUTPATIENT
Start: 2024-04-19 | End: 2024-04-22

## 2024-04-19 RX ORDER — DEXTROSE 50 % IN WATER 50 %
12.5 SYRINGE (ML) INTRAVENOUS ONCE
Refills: 0 | Status: DISCONTINUED | OUTPATIENT
Start: 2024-04-19 | End: 2024-04-22

## 2024-04-19 RX ORDER — SODIUM CHLORIDE 9 MG/ML
1000 INJECTION, SOLUTION INTRAVENOUS
Refills: 0 | Status: DISCONTINUED | OUTPATIENT
Start: 2024-04-19 | End: 2024-04-22

## 2024-04-19 RX ORDER — DEXTROSE 50 % IN WATER 50 %
25 SYRINGE (ML) INTRAVENOUS ONCE
Refills: 0 | Status: DISCONTINUED | OUTPATIENT
Start: 2024-04-19 | End: 2024-04-22

## 2024-04-19 RX ORDER — GLUCAGON INJECTION, SOLUTION 0.5 MG/.1ML
1 INJECTION, SOLUTION SUBCUTANEOUS ONCE
Refills: 0 | Status: DISCONTINUED | OUTPATIENT
Start: 2024-04-19 | End: 2024-04-22

## 2024-04-19 RX ORDER — DEXTROSE 50 % IN WATER 50 %
15 SYRINGE (ML) INTRAVENOUS ONCE
Refills: 0 | Status: DISCONTINUED | OUTPATIENT
Start: 2024-04-19 | End: 2024-04-22

## 2024-04-19 RX ORDER — GABAPENTIN 400 MG/1
300 CAPSULE ORAL EVERY 8 HOURS
Refills: 0 | Status: DISCONTINUED | OUTPATIENT
Start: 2024-04-19 | End: 2024-04-25

## 2024-04-19 RX ADMIN — ATORVASTATIN CALCIUM 40 MILLIGRAM(S): 80 TABLET, FILM COATED ORAL at 21:22

## 2024-04-19 RX ADMIN — SACUBITRIL AND VALSARTAN 1 TABLET(S): 24; 26 TABLET, FILM COATED ORAL at 21:22

## 2024-04-19 RX ADMIN — GABAPENTIN 300 MILLIGRAM(S): 400 CAPSULE ORAL at 21:22

## 2024-04-19 RX ADMIN — Medication 80 MILLIGRAM(S): at 19:41

## 2024-04-19 NOTE — ED ADULT TRIAGE NOTE - CHIEF COMPLAINT QUOTE
pt c/o fatigue, water retention and b/l lower extremity edema for 1 month. pt has expiratory wheezes.

## 2024-04-19 NOTE — ED PROVIDER NOTE - CLINICAL SUMMARY MEDICAL DECISION MAKING FREE TEXT BOX
HPI: 63-year-old male past medical history of CHF, thyroid dysfunction, diabetes, PPM placement recently admitted to [facility] 2 weeks ago for acute on chronic heart failure exacerbation presents complaining of fluid retention.  Patient states he has been having excessive wheezing and difficulty breathing since discharge and worsening fluid retention bilateral lower extremity over the last week.  Patient follows with Dr. Negrete and Dr. Kirkland and was instructed to take 60 mg of Lasix yesterday increased from his baseline 40 mg without resolution of symptoms, prompted to come to the ER today for evaluation.  Patient states still able to ambulate without difficulty and breathing without severe difficulty however endorsing orthopnea and wheezing.  Denies chest pain.  No other current complaints this time.    ROS:   General: No fever, no chills, no malaise, no fatigue  ENT: No earache, no coryza, no sore throat  Neck: No stiffness, no swollen glands, no dysphagia  Respiratory: No cough, + dyspnea, no pleuritic chest pain  Cardiac: no chest pain, no palpitations, + edema, + jvd  Abdomen: No abdominal pain, no nausea, no vomiting, no diarrhea  : No dysuria, no increase frequency, no urgency, No discharge  Musculoskeletal: No myalgia, no arthralgia  Neurologic: No headache, no vertigo, no paresthesia, no focal deficits, no diplopia  Skin: No rash, no evidence of trauma  All other ROS are negative    PE:  General: A&O x3   Head: NC/AT  Eyes: PERRL, EOMI  ENT: Airway patent, mmm. Neck: Neck supple, non-tender without lymphadenopathy, no masses.    Pulmonary: Bilateral basilar rales  Cardiac: s1s2, RRR, no M,G,R, 2+ JVD  Abdomen: +BS, ND, NT, soft, no guarding, no rebound, no masses , no rigidity  Back: Normal  spine  Extremities: FROM, symmetric pulses, capillary refill < 2 seconds, 2+ pitting edema, 5/5 strength in b/l UE and LE  Skin: no rash or bruising  Neurologic: alert, speech clear, no focal deficits  Psych: nl mood/affect, nl insight.    MDM: 63-year-old male past medical history of CHF, thyroid dysfunction, diabetes, PPM placement recently admitted to [facility] 2 weeks ago for acute on chronic heart failure exacerbation presents complaining of fluid retention.  Will obtain CBC, CMP, EKG, TNI.  Provide IV Lasix.  Independent review of chest x-ray shows bilateral pulmonary congestion with cephalization, no pleural effusion or focal consolidation. Admit. HPI: 63-year-old male past medical history of CHF, thyroid dysfunction, diabetes, PPM placement recently admitted to [facility] 2 weeks ago for acute on chronic heart failure exacerbation presents complaining of fluid retention.  Patient states he has been having excessive wheezing and difficulty breathing since discharge and worsening fluid retention bilateral lower extremity over the last week.  Patient follows with Dr. Negrete and Dr. Kirkland and was instructed to take 60 mg of Lasix yesterday increased from his baseline 40 mg without resolution of symptoms, prompted to come to the ER today for evaluation.  Patient states still able to ambulate without difficulty and breathing without severe difficulty however endorsing orthopnea and wheezing.  Denies chest pain.  No other current complaints this time.    ROS:   General: No fever, no chills, no malaise, no fatigue  ENT: No earache, no coryza, no sore throat  Neck: No stiffness, no swollen glands, no dysphagia  Respiratory: No cough, + dyspnea, no pleuritic chest pain  Cardiac: no chest pain, no palpitations, + edema, + jvd  Abdomen: No abdominal pain, no nausea, no vomiting, no diarrhea  : No dysuria, no increase frequency, no urgency, No discharge  Musculoskeletal: No myalgia, no arthralgia  Neurologic: No headache, no vertigo, no paresthesia, no focal deficits, no diplopia  Skin: No rash, no evidence of trauma  All other ROS are negative    PE:  General: A&O x3   Head: NC/AT  Eyes: PERRL, EOMI  ENT: Airway patent, mmm. Neck: Neck supple, non-tender without lymphadenopathy, no masses.    Pulmonary: Bilateral basilar rales  Cardiac: s1s2, RRR, no M,G,R, 2+ JVD  Abdomen: +BS, ND, NT, soft, no guarding, no rebound, no masses , no rigidity  Back: Normal  spine  Extremities: FROM, symmetric pulses, capillary refill < 2 seconds, 2+ pitting edema, 5/5 strength in b/l UE and LE  Skin: no rash or bruising  Neurologic: alert, speech clear, no focal deficits  Psych: nl mood/affect, nl insight.    MDM: 63-year-old male past medical history of CHF, thyroid dysfunction, diabetes, PPM placement recently admitted to [facility] 2 weeks ago for acute on chronic heart failure exacerbation presents complaining of fluid retention.  Will obtain CBC, CMP, EKG, TNI.  Provide IV Lasix.  Independent review of chest x-ray shows bilateral pulmonary congestion with cephalization, no pleural effusion or focal consolidation. Independent review of EKG shows sinus rhythm with poorly discernible P wave at regular rate of 79 bpm, , QTc 394, motion artifact in V4 through V6, otherwise no T wave abnormalities, no STEMI. Admit.

## 2024-04-19 NOTE — ED PROVIDER NOTE - WR INTERPRETATION 1
Independent review of chest x-ray shows bilateral pulmonary congestion with cephalization, no pleural effusion or focal consolidation.

## 2024-04-19 NOTE — ED CDU PROVIDER INITIAL DAY NOTE - OBJECTIVE STATEMENT
63-year-old male with history of CHF, DM, PPM presenting with shortness of breath and wheezing. Patient reports recent hospitalization at [facility] for heart failure exacerbation. Patient follows with Dr. Negrete and Dr. Kirkland and was instructed to take 60 mg of Lasix yesterday increased from his baseline 40 mg without resolution of symptoms.  Denies fever, chest pain, cough.

## 2024-04-19 NOTE — ED CDU PROVIDER INITIAL DAY NOTE - PHYSICAL EXAMINATION
General: Well appearing in no acute distress, alert and cooperative  Head: Normocephalic, atraumatic  Eyes: PERRLA, no conjunctival injection, no scleral icterus, EOMI  ENMT: Atraumatic external nose and ears  Neck: Soft and supple, full ROM without pain  Cardiac: Regular rate and regular rhythm, no murmurs, no LE edema.  Resp: Unlabored respiratory effort, faint crackles   Abd: Soft, non-tender, non-distended  MSK: Spine midline and non-tender  Skin: Warm and dry  Neuro: AO x 3, moves all extremities symmetrically, Motor strength and sensation grossly intact

## 2024-04-19 NOTE — ED ADULT NURSE REASSESSMENT NOTE - NS ED NURSE REASSESS COMMENT FT1
Assumed care of pt. Pt returned from Echo. Pt remains connected to cardiac monitor-AFib HR 80 on room air. Pt c/o SOB. Denies chest pain. Pt connected to  SpO2 96%. AOx3, alert and calm. Airway patent respirations even unlabored.

## 2024-04-19 NOTE — ED CDU PROVIDER INITIAL DAY NOTE - CLINICAL SUMMARY MEDICAL DECISION MAKING FREE TEXT BOX
63-year-old male with dyspnea in the setting of known heart failure placed in observation unit for further management. Follows with Dr. Negrete and Dr. Kirkland outpatient.   Treated for heart failure exacerbation in the emergency department with cardiology consult placed.  Hemodynamically stable, faint crackles bilaterally, no pitting edema.  Labs and imaging independently reviewed and interpreted.  Pending cardiology recommendations.

## 2024-04-19 NOTE — ED ADULT NURSE REASSESSMENT NOTE - NS ED NURSE REASSESS COMMENT FT1
Pt sitting up in bed, A&Ox4 in NAD @ this time. Pt slightly tachypneic. Pt afib on cardiac monitor. Pt c/o sob and edema. Pt denies any pain, other complaints, or needs. Pt in OBS and awaiting additional testing. Safety maintained.     Report given to BLAKE [place]. Pt taken to OBS.

## 2024-04-19 NOTE — ED ADULT NURSE NOTE - ED STAT RN HANDOFF DETAILS
Report given to  REJI Hammonds RN in CDU 13L. No apparent distress noted at this time. Vital signs stable. Respirations even and unlabored. Pt paced NRS on cardiac monitor. Care transferred.

## 2024-04-19 NOTE — ED CDU PROVIDER INITIAL DAY NOTE - ATTENDING CONTRIBUTION TO CARE
indep eval  in OBS for CHF protocol  needs gentle diuresis  seen by cardiology  lungs sig mild crackles at bases upon my exam NAD  agree w care plan Minocycline Counseling: Patient advised regarding possible photosensitivity and discoloration of the teeth, skin, lips, tongue and gums.  Patient instructed to avoid sunlight, if possible.  When exposed to sunlight, patients should wear protective clothing, sunglasses, and sunscreen.  The patient was instructed to call the office immediately if the following severe adverse effects occur:  hearing changes, easy bruising/bleeding, severe headache, or vision changes.  The patient verbalized understanding of the proper use and possible adverse effects of minocycline.  All of the patient's questions and concerns were addressed.

## 2024-04-19 NOTE — ED ADULT NURSE NOTE - OBJECTIVE STATEMENT
Pt to ED c/o fatigue, SOB, and edema from abd to BLE. Pt states he was recently d/c'd from [place] for similar. Pt's abd distended.  Pt noted to have +2 BLE edema. Pt denies cp, fever, cough, dizziness/lightheadedness. Pt A&Ox4 in NAD @ this time. RR even & unlabored. Hx CHF

## 2024-04-20 DIAGNOSIS — I50.21 ACUTE SYSTOLIC (CONGESTIVE) HEART FAILURE: ICD-10-CM

## 2024-04-20 LAB
ALBUMIN SERPL ELPH-MCNC: 3.9 G/DL — SIGNIFICANT CHANGE UP (ref 3.3–5.2)
ALP SERPL-CCNC: 87 U/L — SIGNIFICANT CHANGE UP (ref 40–120)
ALT FLD-CCNC: 20 U/L — SIGNIFICANT CHANGE UP
ANION GAP SERPL CALC-SCNC: 9 MMOL/L — SIGNIFICANT CHANGE UP (ref 5–17)
AST SERPL-CCNC: 23 U/L — SIGNIFICANT CHANGE UP
BASOPHILS # BLD AUTO: 0.03 K/UL — SIGNIFICANT CHANGE UP (ref 0–0.2)
BASOPHILS NFR BLD AUTO: 0.5 % — SIGNIFICANT CHANGE UP (ref 0–2)
BILIRUB SERPL-MCNC: 0.6 MG/DL — SIGNIFICANT CHANGE UP (ref 0.4–2)
BUN SERPL-MCNC: 37.3 MG/DL — HIGH (ref 8–20)
CALCIUM SERPL-MCNC: 8.9 MG/DL — SIGNIFICANT CHANGE UP (ref 8.4–10.5)
CHLORIDE SERPL-SCNC: 99 MMOL/L — SIGNIFICANT CHANGE UP (ref 96–108)
CO2 SERPL-SCNC: 27 MMOL/L — SIGNIFICANT CHANGE UP (ref 22–29)
CREAT SERPL-MCNC: 1.48 MG/DL — HIGH (ref 0.5–1.3)
EGFR: 53 ML/MIN/1.73M2 — LOW
EOSINOPHIL # BLD AUTO: 0.28 K/UL — SIGNIFICANT CHANGE UP (ref 0–0.5)
EOSINOPHIL NFR BLD AUTO: 4.7 % — SIGNIFICANT CHANGE UP (ref 0–6)
GLUCOSE BLDC GLUCOMTR-MCNC: 222 MG/DL — HIGH (ref 70–99)
GLUCOSE BLDC GLUCOMTR-MCNC: 264 MG/DL — HIGH (ref 70–99)
GLUCOSE BLDC GLUCOMTR-MCNC: 293 MG/DL — HIGH (ref 70–99)
GLUCOSE BLDC GLUCOMTR-MCNC: 327 MG/DL — HIGH (ref 70–99)
GLUCOSE SERPL-MCNC: 330 MG/DL — HIGH (ref 70–99)
HCT VFR BLD CALC: 37.4 % — LOW (ref 39–50)
HGB BLD-MCNC: 11.7 G/DL — LOW (ref 13–17)
IMM GRANULOCYTES NFR BLD AUTO: 1 % — HIGH (ref 0–0.9)
LYMPHOCYTES # BLD AUTO: 0.5 K/UL — LOW (ref 1–3.3)
LYMPHOCYTES # BLD AUTO: 8.5 % — LOW (ref 13–44)
MCHC RBC-ENTMCNC: 28.3 PG — SIGNIFICANT CHANGE UP (ref 27–34)
MCHC RBC-ENTMCNC: 31.3 GM/DL — LOW (ref 32–36)
MCV RBC AUTO: 90.6 FL — SIGNIFICANT CHANGE UP (ref 80–100)
MONOCYTES # BLD AUTO: 0.72 K/UL — SIGNIFICANT CHANGE UP (ref 0–0.9)
MONOCYTES NFR BLD AUTO: 12.2 % — SIGNIFICANT CHANGE UP (ref 2–14)
NEUTROPHILS # BLD AUTO: 4.31 K/UL — SIGNIFICANT CHANGE UP (ref 1.8–7.4)
NEUTROPHILS NFR BLD AUTO: 73.1 % — SIGNIFICANT CHANGE UP (ref 43–77)
PLATELET # BLD AUTO: 170 K/UL — SIGNIFICANT CHANGE UP (ref 150–400)
POTASSIUM SERPL-MCNC: 4.8 MMOL/L — SIGNIFICANT CHANGE UP (ref 3.5–5.3)
POTASSIUM SERPL-SCNC: 4.8 MMOL/L — SIGNIFICANT CHANGE UP (ref 3.5–5.3)
PROT SERPL-MCNC: 7.6 G/DL — SIGNIFICANT CHANGE UP (ref 6.6–8.7)
RBC # BLD: 4.13 M/UL — LOW (ref 4.2–5.8)
RBC # FLD: 14.2 % — SIGNIFICANT CHANGE UP (ref 10.3–14.5)
SODIUM SERPL-SCNC: 135 MMOL/L — SIGNIFICANT CHANGE UP (ref 135–145)
WBC # BLD: 5.9 K/UL — SIGNIFICANT CHANGE UP (ref 3.8–10.5)
WBC # FLD AUTO: 5.9 K/UL — SIGNIFICANT CHANGE UP (ref 3.8–10.5)

## 2024-04-20 PROCEDURE — 93010 ELECTROCARDIOGRAM REPORT: CPT

## 2024-04-20 PROCEDURE — 99233 SBSQ HOSP IP/OBS HIGH 50: CPT

## 2024-04-20 PROCEDURE — 99232 SBSQ HOSP IP/OBS MODERATE 35: CPT | Mod: 25

## 2024-04-20 PROCEDURE — 99223 1ST HOSP IP/OBS HIGH 75: CPT

## 2024-04-20 RX ORDER — ALBUTEROL 90 UG/1
2.5 AEROSOL, METERED ORAL EVERY 6 HOURS
Refills: 0 | Status: DISCONTINUED | OUTPATIENT
Start: 2024-04-20 | End: 2024-04-25

## 2024-04-20 RX ORDER — INSULIN GLARGINE 100 [IU]/ML
30 INJECTION, SOLUTION SUBCUTANEOUS AT BEDTIME
Refills: 0 | Status: DISCONTINUED | OUTPATIENT
Start: 2024-04-21 | End: 2024-04-25

## 2024-04-20 RX ORDER — INSULIN GLARGINE 100 [IU]/ML
30 INJECTION, SOLUTION SUBCUTANEOUS ONCE
Refills: 0 | Status: COMPLETED | OUTPATIENT
Start: 2024-04-20 | End: 2024-04-20

## 2024-04-20 RX ORDER — IPRATROPIUM/ALBUTEROL SULFATE 18-103MCG
3 AEROSOL WITH ADAPTER (GRAM) INHALATION ONCE
Refills: 0 | Status: COMPLETED | OUTPATIENT
Start: 2024-04-20 | End: 2024-04-20

## 2024-04-20 RX ORDER — ALBUTEROL 90 UG/1
1 AEROSOL, METERED ORAL EVERY 4 HOURS
Refills: 0 | Status: DISCONTINUED | OUTPATIENT
Start: 2024-04-20 | End: 2024-04-25

## 2024-04-20 RX ORDER — FUROSEMIDE 40 MG
40 TABLET ORAL ONCE
Refills: 0 | Status: COMPLETED | OUTPATIENT
Start: 2024-04-20 | End: 2024-04-20

## 2024-04-20 RX ADMIN — ATORVASTATIN CALCIUM 40 MILLIGRAM(S): 80 TABLET, FILM COATED ORAL at 21:46

## 2024-04-20 RX ADMIN — Medication 40 MILLIGRAM(S): at 05:31

## 2024-04-20 RX ADMIN — APIXABAN 5 MILLIGRAM(S): 2.5 TABLET, FILM COATED ORAL at 17:13

## 2024-04-20 RX ADMIN — SACUBITRIL AND VALSARTAN 1 TABLET(S): 24; 26 TABLET, FILM COATED ORAL at 17:13

## 2024-04-20 RX ADMIN — Medication 6: at 17:13

## 2024-04-20 RX ADMIN — SACUBITRIL AND VALSARTAN 1 TABLET(S): 24; 26 TABLET, FILM COATED ORAL at 05:33

## 2024-04-20 RX ADMIN — AMLODIPINE BESYLATE 10 MILLIGRAM(S): 2.5 TABLET ORAL at 05:33

## 2024-04-20 RX ADMIN — Medication 40 MILLIGRAM(S): at 23:42

## 2024-04-20 RX ADMIN — GABAPENTIN 300 MILLIGRAM(S): 400 CAPSULE ORAL at 05:33

## 2024-04-20 RX ADMIN — GABAPENTIN 300 MILLIGRAM(S): 400 CAPSULE ORAL at 13:19

## 2024-04-20 RX ADMIN — ALBUTEROL 2.5 MILLIGRAM(S): 90 AEROSOL, METERED ORAL at 23:42

## 2024-04-20 RX ADMIN — Medication 8: at 09:06

## 2024-04-20 RX ADMIN — Medication 40 MILLIGRAM(S): at 13:16

## 2024-04-20 RX ADMIN — Medication 6: at 12:38

## 2024-04-20 RX ADMIN — Medication 150 MICROGRAM(S): at 05:33

## 2024-04-20 RX ADMIN — APIXABAN 5 MILLIGRAM(S): 2.5 TABLET, FILM COATED ORAL at 05:33

## 2024-04-20 RX ADMIN — Medication 3 MILLILITER(S): at 14:49

## 2024-04-20 RX ADMIN — GABAPENTIN 300 MILLIGRAM(S): 400 CAPSULE ORAL at 21:46

## 2024-04-20 NOTE — ED CDU PROVIDER SUBSEQUENT DAY NOTE - HISTORY
Pt resting comfortably at time of re-assessment. No events overnight. Pending Cards/diuresis  Will continue to monitor.

## 2024-04-20 NOTE — ED ADULT NURSE REASSESSMENT NOTE - NS ED NURSE REASSESS COMMENT FT1
Assumed care of patient at 07:15 from BLAKE Moulton. Charting as noted. Patient AA&Ox4, resp even/unlabored, presents to ED c/o difficulty breathing. At time of assessment, patient denies pain/discomfort, denies CP/SOB. Patient updated on the plan of care, awaiting further management of care. Stretcher locked in lowest position, IV site flushed w/ NS. No redness, swelling or pain noted to site. No signs of acute distress noted, safety maintained. Pt remains on CM in NSR, rate 72, SpO2 95% on room air.

## 2024-04-20 NOTE — PROGRESS NOTE ADULT - SUBJECTIVE AND OBJECTIVE BOX
[facility]                                                         CARDIOLOGY AT [facility]                                                                  [address] [facility], [id number] [place] [address]                                                         Telephone: [phone]. Fax:[phone]                                                                             PROGRESS NOTE    Reason for follow up: Acute HFrEF   Update: Pt seen and examined at the bedside. Pt is still requiring 2L nasal cannula but in no acute distress. Pt reports feeling better since IV diuresis was started.     Review of symptoms:   Cardiac:  No chest pain. No dyspnea. No palpitations.  Respiratory: no cough. No dyspnea  Gastrointestinal: No diarrhea. No abdominal pain. No bleeding.   Neuro: No focal neuro complaints.    Vitals:  T(C): 36.3 (04-20-24 @ 11:12), Max: 36.9 (04-20-24 @ 05:30)  HR: 75 (04-20-24 @ 11:12) (67 - 98)  BP: 136/77 (04-20-24 @ 11:12) (127/58 - 157/75)  RR: 94 (04-20-24 @ 11:12) (18 - 94)  SpO2: 97% (04-20-24 @ 09:27) (93% - 97%)  Wt(kg): --  I&O's Summary    Weight (kg): 109.8 (04-19 @ 15:14)    PHYSICAL EXAM:  Appearance: Comfortable. No acute distress  HEENT:  Atraumatic. Normocephalic.  Normal oral mucosa  Neurologic: A & O x 3, no gross focal deficits.  Cardiovascular: RRR S1 S2, +murmur, no rubs/gallops. No JVD  Respiratory: B/L lung sounds coarse, unlabored   Gastrointestinal:  Soft, Non-tender, + BS  Lower Extremities: 2+ Peripheral Pulses, No clubbing, cyanosis,. + 2 B/L LE edema  Psychiatry: Patient is calm. No agitation.   Skin: warm and dry.    CURRENT CARDIAC MEDICATIONS:  amLODIPine   Tablet 10 milliGRAM(s) Oral daily  furosemide   Injectable 40 milliGRAM(s) IV Push two times a day  sacubitril 24 mG/valsartan 26 mG 1 Tablet(s) Oral two times a day      CURRENT OTHER MEDICATIONS:  gabapentin 300 milliGRAM(s) Oral every 8 hours  atorvastatin 40 milliGRAM(s) Oral at bedtime  dextrose 50% Injectable 25 Gram(s) IV Push once, Stop order after: 1 Doses  dextrose 50% Injectable 12.5 Gram(s) IV Push once, Stop order after: 1 Doses  dextrose Oral Gel 15 Gram(s) Oral once, Stop order after: 1 Doses PRN Blood Glucose LESS THAN 70 milliGRAM(s)/deciliter  glucagon  Injectable 1 milliGRAM(s) IntraMuscular once, Stop order after: 1 Doses  insulin lispro (ADMELOG) corrective regimen sliding scale   SubCutaneous three times a day before meals  insulin lispro (ADMELOG) corrective regimen sliding scale   SubCutaneous at bedtime  levothyroxine 150 MICROGram(s) Oral daily  apixaban 5 milliGRAM(s) Oral every 12 hours  dextrose 10% Bolus 125 milliLiter(s) IV Bolus once, Stop order after: 1 Doses  dextrose 5%. 1000 milliLiter(s) (100 mL/Hr) IV Continuous <Continuous>  dextrose 5%. 1000 milliLiter(s) (50 mL/Hr) IV Continuous <Continuous>      LABS:	 	             11.7   5.90  )-----------( 170      ( 20 Apr 2024 09:23 )             37.4     04-20    135  |  99  |  37.3<H>  ----------------------------<  330<H>  4.8   |  27.0  |  1.48<H>    Ca    8.9      20 Apr 2024 09:23    TPro  7.6  /  Alb  3.9  /  TBili  0.6  /  DBili  x   /  AST  23  /  ALT  20  /  AlkPhos  87  04-20    TELEMETRY: A paced with underlying Afib.    ECG: Afib     DIAGNOSTIC TESTING:  [ x] Echocardiogram: < from: TTE W or WO Ultrasound Enhancing Agent (04.19.24 @ 19:00) >      1. Septal motion is abnormal consistent with previous cardiac surgery. Left ventricular systolic function is moderately decreased with an ejection fraction visually estimated at 35 to 40 %.   2. Mild left ventricular hypertrophy.   3. The left ventricular diastolic function is indeterminate, with indeterminant filling pressure.   4. Moderately enlarged right ventricular cavity size and moderately reduced systolic function.   5. Estimated pulmonary artery systolic pressure is 36 mmHg.   6. Moderate tricuspid regurgitation.   7. Mitral annuloplasty ring present with mild residual regurgitation.   8. Mild pulmonic regurgitation.   9. The left atrium is normal in size.  10. The right atrium is mildly dilated.  11. No pericardial effusion seen.  12. Compared to the transthoracic echocardiogram performed on 3/2023, prior LV EF reportedly of 40-45%.    < end of copied text >    [ ]  Catheterization:  [ ] Stress Test:    OTHER:

## 2024-04-20 NOTE — ED CDU PROVIDER SUBSEQUENT DAY NOTE - PROGRESS NOTE DETAILS
patient re-assessed, visible out of breath, made aware of decrease in EF, will re-consult [facility] cardiology for definitive plan as consult note reports if any changes will consider ischemic work up.  Pending rpt labs, since increase of IV lasix 40mg BID.  Likely needs more time with medication for diuresis.

## 2024-04-20 NOTE — ED ADULT NURSE REASSESSMENT NOTE - NS ED NURSE REASSESS COMMENT FT1
Pt remains connected to cardiac monitor-AFib HR 70 on room air SpO2 96%. Airway patent respirations even unlabored. Abdomen distended soft. IV intact flushed with blood return. No c/o at this time.

## 2024-04-20 NOTE — H&P ADULT - NSICDXPASTMEDICALHX_GEN_ALL_CORE_FT
PAST MEDICAL HISTORY:  AF (paroxysmal atrial fibrillation)     CAD (coronary artery disease)     DM (diabetes mellitus)     H/O CHF     Hypothyroidism     PAD (peripheral artery disease)

## 2024-04-20 NOTE — H&P ADULT - NSHPPHYSICALEXAM_GEN_ALL_CORE
Vital Signs Last 24 Hrs  T(C): 36.4 (21 Apr 2024 00:00), Max: 36.9 (20 Apr 2024 05:30)  T(F): 97.6 (21 Apr 2024 00:00), Max: 98.5 (20 Apr 2024 05:30)  HR: 77 (21 Apr 2024 00:00) (67 - 82)  BP: 140/83 (21 Apr 2024 00:00) (114/59 - 167/94)  BP(mean): 104 (20 Apr 2024 22:05) (77 - 104)  RR: 17 (21 Apr 2024 00:00) (17 - 94)  SpO2: 94% (21 Apr 2024 00:00) (93% - 97%)    Parameters below as of 20 Apr 2024 22:05  Patient On (Oxygen Delivery Method): room air    General: pt. in bed not in distress  HEENT: AT, NC. PERRL. intact EOM. no throat erythema or exudate.   Neck: supple. no JVD.   Chest: basal crackles bilaterally  Heart: S1,S2. RRR. no heart murmur. 2 + edema of B/L LE.  Abdomen: soft. non-tender. non-distended. + BS.   Ext: no calf tenderness. ROM of all ext. intact  vascular : distal pulses palpable  Neuro: AAO x3. no focal weakness. no speech disorder, cns ii to xii intact  Lymphatic : no palpable LN  Skin: warm and dry  psych : mood ok, no si/hi

## 2024-04-20 NOTE — H&P ADULT - NSICDXFAMILYHX_GEN_ALL_CORE_FT
FAMILY HISTORY:  Mother  Still living? Unknown  FH: CHF (congestive heart failure), Age at diagnosis: Age Unknown    Sibling  Still living? Unknown  Family history of diabetes mellitus (DM), Age at diagnosis: Age Unknown  FH: CHF (congestive heart failure), Age at diagnosis: Age Unknown

## 2024-04-20 NOTE — PROGRESS NOTE ADULT - ASSESSMENT
Patient is a 64 y/o M with hx of CAD s/p CABG x 1, MV repair, KOFI clip/MAZE (2003), PCI with DARYA x 1 Ramus (2021), HFrEF (EF 40-45%), Afib (on Eliquis), chronotropic incompetence s/p dual chamber ICD, MRI concerning with cardiac sarcoidosis, VF s/p ICD, IDDMII, Grave's disease s/p radioactive iodine, MAEGAN, COPD, PAD s/p LE stent, right carotid endartectomy, GI bleed (no longer on plavix) who presented to the ED with complaints of leg swelling, shortness of breath, and orthopnea. Patient states that for the last month he has been experiencing worsening dyspnea on exertion and leg swelling. Patient was seen by Dr. Kirkland 04/03/24 and referred to [facility] for worsening symptoms, he was diagnosed with PNA there, and discharged home the following day. Patient states that ever since then his symptoms have gotten worse despite taking his lasix 40mg daily. Patient called Dr. Kirkland's office and increased his lasix to 60mg for the last 2 days, but when he also had some chest pressure and dyspnea at rest he came to the ER to be evaluated. Patient currently denies fevers, chills, CP, syncope, near syncope, N/V/D, headache, or dizziness.  Troponin 28  pBNP 1072

## 2024-04-20 NOTE — H&P ADULT - HISTORY OF PRESENT ILLNESS
Patient is a 64 y/o Male with h/o  CAD s/p CABG x 1, MV repair, KOFI clip/MAZE (2003), PCI with DARYA x 1 Ramus (2021), HFrEF (EF 40-45%), Afib (on Eliquis), chronotropic incompetence s/p dual chamber ICD, MRI concerning with cardiac sarcoidosis, VF s/p ICD, IDDMII, Grave's disease s/p radioactive iodine, MAEGAN, COPD, PAD s/p LE stent, right carotid endartectomy, small bowel AVM (no longer on plavix) who presented to the ED with complaints of leg swelling, shortness of breath, and orthopnea. Patient states that for the last month he has been experiencing worsening dyspnea on exertion and leg swelling. Patient was seen by Dr. Kirkland 04/03/24 and referred to [facility] for worsening symptoms, he was diagnosed with PNA there, and discharged home the following day. Patient states that ever since then his symptoms have gotten worse despite taking his lasix 40mg daily. Patient called Dr. Kirkland's office and increased his lasix to 60mg for the last 2 days, but when he also had some chest pressure and dyspnea at rest he came to the ER to be evaluated. Patient currently denies fevers, chills, CP, syncope, near syncope, N/V/D, headache, or dizziness.   Patient is a 64 y/o Male with h/o  CAD s/p CABG x 1, MV repair, KOFI clip/MAZE (2003), PCI with DARYA x 1 Ramus (2021), HFrEF (EF 40-45%), Afib (on Eliquis), chronotropic incompetence s/p dual chamber ICD, MRI concerning with cardiac sarcoidosis, VF s/p ICD, IDDMII, Grave's disease s/p radioactive iodine, MAEGAN does not use cpap/ bipap,COPD ? PAD s/p LE stent, right carotid endartectomy, small bowel AVM (no longer on plavix) who presented to the ED with complaints of leg swelling, shortness of breath, and orthopnea. Patient states that for the last month he has been experiencing worsening dyspnea on exertion and leg swelling. Patient was seen by Dr. Kirkland 04/03/24 and referred to [facility] for worsening symptoms, he was diagnosed with PNA there, and discharged home the following day. Patient states that ever since then his symptoms have gotten worse despite taking his lasix 40mg daily. Patient called Dr. Kirkland's office and increased his lasix to 60mg for the last 2 days, but when he also had some chest pressure and dyspnea at rest he came to the ER to be evaluated. Patient currently denies fevers, chills, CP, syncope, near syncope, N/V/D, headache, or dizziness.   Patient is a 62 y/o Male who was under ER observation but now admitted to hospitalist team , pt. with acute chf with reduced EF. pt. 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 PAD s/p LE stent, right carotid endartectomy, small bowel AVM (no longer on plavix) who presented to the ED with complaints of leg swelling, shortness of breath, and orthopnea. Patient states that for the last month he has been experiencing worsening dyspnea on exertion and leg swelling. Patient was seen by Dr. Kirkland 04/03/24 and referred to [facility] for worsening symptoms, he was diagnosed with PNA there, and discharged home the following day. Patient states that ever since then his symptoms have gotten worse despite taking his lasix 40mg daily. Patient called Dr. Kirkland's office and increased his lasix to 60mg for the last 2 days, but when he had some chest pressure and dyspnea at rest he came to the ER to be evaluated. Patient currently denies fevers, chills, CP, syncope, near syncope, headache. abd. pain, n/v/d.

## 2024-04-20 NOTE — H&P ADULT - PROBLEM SELECTOR PLAN 1
admit to tele  CHF eaxc. reduced EF   wt. daily   i/o   lasix 40 mg iv bid  entresto  toprol xl 25 mg daily started today 4/21/24 as cardiology recommended.  cardiology team following  ischemia work/ cath as per cardiology team

## 2024-04-20 NOTE — ED CDU PROVIDER SUBSEQUENT DAY NOTE - ATTENDING APP SHARED VISIT CONTRIBUTION OF CARE
I, Darinel Somers MD, performed the initial face to face bedside interview with this patient regarding history of present illness, review of symptoms and relevant past medical, social and family history.  I completed an independent physical examination.  I was the initial provider who evaluated this patient. I have signed out the follow up of any pending tests (i.e. labs, radiological studies) to the ACP.  I have communicated the patient’s plan of care and disposition with the ACP.

## 2024-04-20 NOTE — ED ADULT NURSE REASSESSMENT NOTE - NS ED NURSE REASSESS COMMENT FT1
Call from ED monitor tech for 6 beats VTach. ROMINA Delgado made aware. Pt denies SOB an chest pain. Pt remains connected to cardiac monitor-Afib HR 70. Call from ED monitor tech for 6 beats VTach. ROMINA Delgado made aware- additional order placed for EKG. Pt denies SOB an chest pain. Pt remains connected to cardiac monitor-Afib HR 70.

## 2024-04-20 NOTE — ED CDU PROVIDER SUBSEQUENT DAY NOTE - CLINICAL SUMMARY MEDICAL DECISION MAKING FREE TEXT BOX
63-year-old male past medical history of CHF, thyroid dysfunction, diabetes, PPM placement placed in observation for CHF exacerbation.   BNP elevated, troponin stable  TTE, Cards in AM

## 2024-04-20 NOTE — ED CDU PROVIDER DISPOSITION NOTE - CLINICAL COURSE
63-year-old male past medical history of CHF, thyroid dysfunction, diabetes, PPM placement recently admitted to [facility] 2 weeks ago for acute on chronic heart failure exacerbation presents complaining of fluid retention.  Patient states he has been having excessive wheezing and difficulty breathing since discharge and worsening fluid retention bilateral lower extremity over the last week.  Patient follows with Dr. Negrete and Dr. Kirkland and was instructed to take 60 mg of Lasix yesterday increased from his baseline 40 mg without resolution of symptoms, prompted to come to the ER today for evaluation.  Patient states still able to ambulate without difficulty and breathing without severe difficulty however endorsing orthopnea and wheezing.  Placed into observation, seen by cardiology, recommended echo, which revealed decrease in EF.  Cardiology advises admission for HF.

## 2024-04-20 NOTE — H&P ADULT - NSICDXPASTSURGICALHX_GEN_ALL_CORE_FT
PAST SURGICAL HISTORY:  H/O mitral valve repair     History of implantable cardiac defibrillator (ICD)     S/P CABG (coronary artery bypass graft)

## 2024-04-20 NOTE — ED ADULT NURSE REASSESSMENT NOTE - NS ED NURSE REASSESS COMMENT FT1
Pt comfortably resting at this time w no c/o. Pt remains connected to cardiac monitor-AFib HR 80 on room air SpO2 95%. Airway patent respirations even unlabored. SOB decreased on reassessment, Denies chest pain.

## 2024-04-20 NOTE — ED ADULT NURSE REASSESSMENT NOTE - NS ED NURSE REASSESS COMMENT FT1
Assumed care of patient at 1915 from PERFECTO Jay RN. Patient A&Ox4.  Respirations even and unlabored. Skin color appropriate for ethnicity. NAD. Pt currently in ED observation. Patient denies pain/discomfort, denies CP/SOB at this time. Stretcher locked in lowest position, IV site flushed, intact, and patent. No redness, swelling or pain noted to IV site. No signs of acute distress noted, safety maintained. Pt admitted to hospital awaiting bed placement. Pt NSR on cardiac monitor. Pt noted to have audible wheeze 93% on room air. Patient updated on the plan of care. Assumed care of patient at 1915 from PERFECTO Jay RN. Patient A&Ox4.  Respirations even and unlabored. Skin color appropriate for ethnicity. NAD. Pt currently in ED observation. Patient denies pain/discomfort, denies CP/SOB at this time. Stretcher locked in lowest position, IV site flushed, intact, and patent. No redness, swelling or pain noted to IV site. No signs of acute distress noted, safety maintained. Pt admitted to hospital awaiting bed placement. Pt paced NSR on cardiac monitor. Pt noted to have audible wheeze 93% on room air. Patient updated on the plan of care.

## 2024-04-20 NOTE — H&P ADULT - ASSESSMENT
Patient is a 62 y/o Male who was under ER observation but now admitted to hospitalist team , pt. with acute chf with reduced EF. pt. 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 PAD s/p LE stent, right carotid endartectomy, small bowel AVM (no longer on plavix) who presented to the ED with complaints of leg swelling, shortness of breath, and orthopnea

## 2024-04-21 DIAGNOSIS — E11.9 TYPE 2 DIABETES MELLITUS WITHOUT COMPLICATIONS: ICD-10-CM

## 2024-04-21 DIAGNOSIS — Z95.1 PRESENCE OF AORTOCORONARY BYPASS GRAFT: Chronic | ICD-10-CM

## 2024-04-21 DIAGNOSIS — Z95.810 PRESENCE OF AUTOMATIC (IMPLANTABLE) CARDIAC DEFIBRILLATOR: Chronic | ICD-10-CM

## 2024-04-21 DIAGNOSIS — I48.20 CHRONIC ATRIAL FIBRILLATION, UNSPECIFIED: ICD-10-CM

## 2024-04-21 DIAGNOSIS — Z98.890 OTHER SPECIFIED POSTPROCEDURAL STATES: Chronic | ICD-10-CM

## 2024-04-21 DIAGNOSIS — E03.9 HYPOTHYROIDISM, UNSPECIFIED: ICD-10-CM

## 2024-04-21 DIAGNOSIS — I50.9 HEART FAILURE, UNSPECIFIED: ICD-10-CM

## 2024-04-21 DIAGNOSIS — I25.10 ATHEROSCLEROTIC HEART DISEASE OF NATIVE CORONARY ARTERY WITHOUT ANGINA PECTORIS: ICD-10-CM

## 2024-04-21 LAB
A1C WITH ESTIMATED AVERAGE GLUCOSE RESULT: 8.5 % — HIGH (ref 4–5.6)
ALBUMIN SERPL ELPH-MCNC: 3.8 G/DL — SIGNIFICANT CHANGE UP (ref 3.3–5.2)
ALP SERPL-CCNC: 84 U/L — SIGNIFICANT CHANGE UP (ref 40–120)
ALT FLD-CCNC: 20 U/L — SIGNIFICANT CHANGE UP
ANION GAP SERPL CALC-SCNC: 12 MMOL/L — SIGNIFICANT CHANGE UP (ref 5–17)
AST SERPL-CCNC: 22 U/L — SIGNIFICANT CHANGE UP
BASOPHILS # BLD AUTO: 0.05 K/UL — SIGNIFICANT CHANGE UP (ref 0–0.2)
BASOPHILS NFR BLD AUTO: 0.7 % — SIGNIFICANT CHANGE UP (ref 0–2)
BILIRUB SERPL-MCNC: 0.5 MG/DL — SIGNIFICANT CHANGE UP (ref 0.4–2)
BUN SERPL-MCNC: 45.2 MG/DL — HIGH (ref 8–20)
CALCIUM SERPL-MCNC: 8.7 MG/DL — SIGNIFICANT CHANGE UP (ref 8.4–10.5)
CHLORIDE SERPL-SCNC: 97 MMOL/L — SIGNIFICANT CHANGE UP (ref 96–108)
CHOLEST SERPL-MCNC: 110 MG/DL — SIGNIFICANT CHANGE UP
CO2 SERPL-SCNC: 25 MMOL/L — SIGNIFICANT CHANGE UP (ref 22–29)
CREAT SERPL-MCNC: 1.67 MG/DL — HIGH (ref 0.5–1.3)
EGFR: 46 ML/MIN/1.73M2 — LOW
EOSINOPHIL # BLD AUTO: 0.34 K/UL — SIGNIFICANT CHANGE UP (ref 0–0.5)
EOSINOPHIL NFR BLD AUTO: 4.8 % — SIGNIFICANT CHANGE UP (ref 0–6)
ESTIMATED AVERAGE GLUCOSE: 197 MG/DL — HIGH (ref 68–114)
GLUCOSE BLDC GLUCOMTR-MCNC: 216 MG/DL — HIGH (ref 70–99)
GLUCOSE BLDC GLUCOMTR-MCNC: 229 MG/DL — HIGH (ref 70–99)
GLUCOSE BLDC GLUCOMTR-MCNC: 238 MG/DL — HIGH (ref 70–99)
GLUCOSE BLDC GLUCOMTR-MCNC: 306 MG/DL — HIGH (ref 70–99)
GLUCOSE SERPL-MCNC: 278 MG/DL — HIGH (ref 70–99)
HCT VFR BLD CALC: 35.7 % — LOW (ref 39–50)
HCV AB S/CO SERPL IA: 0.1 S/CO — SIGNIFICANT CHANGE UP (ref 0–0.99)
HCV AB SERPL-IMP: SIGNIFICANT CHANGE UP
HDLC SERPL-MCNC: 42 MG/DL — SIGNIFICANT CHANGE UP
HGB BLD-MCNC: 11.7 G/DL — LOW (ref 13–17)
IMM GRANULOCYTES NFR BLD AUTO: 1.1 % — HIGH (ref 0–0.9)
LIPID PNL WITH DIRECT LDL SERPL: 56 MG/DL — SIGNIFICANT CHANGE UP
LYMPHOCYTES # BLD AUTO: 0.8 K/UL — LOW (ref 1–3.3)
LYMPHOCYTES # BLD AUTO: 11.3 % — LOW (ref 13–44)
MAGNESIUM SERPL-MCNC: 2 MG/DL — SIGNIFICANT CHANGE UP (ref 1.6–2.6)
MCHC RBC-ENTMCNC: 28.7 PG — SIGNIFICANT CHANGE UP (ref 27–34)
MCHC RBC-ENTMCNC: 32.8 GM/DL — SIGNIFICANT CHANGE UP (ref 32–36)
MCV RBC AUTO: 87.5 FL — SIGNIFICANT CHANGE UP (ref 80–100)
MONOCYTES # BLD AUTO: 0.87 K/UL — SIGNIFICANT CHANGE UP (ref 0–0.9)
MONOCYTES NFR BLD AUTO: 12.3 % — SIGNIFICANT CHANGE UP (ref 2–14)
NEUTROPHILS # BLD AUTO: 4.93 K/UL — SIGNIFICANT CHANGE UP (ref 1.8–7.4)
NEUTROPHILS NFR BLD AUTO: 69.8 % — SIGNIFICANT CHANGE UP (ref 43–77)
NON HDL CHOLESTEROL: 68 MG/DL — SIGNIFICANT CHANGE UP
PLATELET # BLD AUTO: 175 K/UL — SIGNIFICANT CHANGE UP (ref 150–400)
POTASSIUM SERPL-MCNC: 4.9 MMOL/L — SIGNIFICANT CHANGE UP (ref 3.5–5.3)
POTASSIUM SERPL-SCNC: 4.9 MMOL/L — SIGNIFICANT CHANGE UP (ref 3.5–5.3)
PROT SERPL-MCNC: 7.3 G/DL — SIGNIFICANT CHANGE UP (ref 6.6–8.7)
RBC # BLD: 4.08 M/UL — LOW (ref 4.2–5.8)
RBC # FLD: 14 % — SIGNIFICANT CHANGE UP (ref 10.3–14.5)
SODIUM SERPL-SCNC: 134 MMOL/L — LOW (ref 135–145)
TRIGL SERPL-MCNC: 59 MG/DL — SIGNIFICANT CHANGE UP
WBC # BLD: 7.07 K/UL — SIGNIFICANT CHANGE UP (ref 3.8–10.5)
WBC # FLD AUTO: 7.07 K/UL — SIGNIFICANT CHANGE UP (ref 3.8–10.5)

## 2024-04-21 PROCEDURE — 99232 SBSQ HOSP IP/OBS MODERATE 35: CPT

## 2024-04-21 PROCEDURE — 99232 SBSQ HOSP IP/OBS MODERATE 35: CPT | Mod: 25

## 2024-04-21 RX ORDER — ALBUTEROL 90 UG/1
2.5 AEROSOL, METERED ORAL EVERY 4 HOURS
Refills: 0 | Status: DISCONTINUED | OUTPATIENT
Start: 2024-04-21 | End: 2024-04-25

## 2024-04-21 RX ORDER — METOPROLOL TARTRATE 50 MG
25 TABLET ORAL DAILY
Refills: 0 | Status: DISCONTINUED | OUTPATIENT
Start: 2024-04-21 | End: 2024-04-25

## 2024-04-21 RX ADMIN — ALBUTEROL 2.5 MILLIGRAM(S): 90 AEROSOL, METERED ORAL at 18:25

## 2024-04-21 RX ADMIN — Medication 4: at 17:09

## 2024-04-21 RX ADMIN — APIXABAN 5 MILLIGRAM(S): 2.5 TABLET, FILM COATED ORAL at 06:45

## 2024-04-21 RX ADMIN — SACUBITRIL AND VALSARTAN 1 TABLET(S): 24; 26 TABLET, FILM COATED ORAL at 06:45

## 2024-04-21 RX ADMIN — Medication 25 MILLIGRAM(S): at 06:46

## 2024-04-21 RX ADMIN — Medication 4: at 12:56

## 2024-04-21 RX ADMIN — ATORVASTATIN CALCIUM 40 MILLIGRAM(S): 80 TABLET, FILM COATED ORAL at 21:11

## 2024-04-21 RX ADMIN — SACUBITRIL AND VALSARTAN 1 TABLET(S): 24; 26 TABLET, FILM COATED ORAL at 17:09

## 2024-04-21 RX ADMIN — GABAPENTIN 300 MILLIGRAM(S): 400 CAPSULE ORAL at 06:45

## 2024-04-21 RX ADMIN — APIXABAN 5 MILLIGRAM(S): 2.5 TABLET, FILM COATED ORAL at 17:09

## 2024-04-21 RX ADMIN — Medication 150 MICROGRAM(S): at 06:45

## 2024-04-21 RX ADMIN — GABAPENTIN 300 MILLIGRAM(S): 400 CAPSULE ORAL at 21:11

## 2024-04-21 RX ADMIN — Medication 4: at 21:11

## 2024-04-21 RX ADMIN — ALBUTEROL 2.5 MILLIGRAM(S): 90 AEROSOL, METERED ORAL at 06:23

## 2024-04-21 RX ADMIN — Medication 4: at 09:47

## 2024-04-21 RX ADMIN — Medication 40 MILLIGRAM(S): at 13:51

## 2024-04-21 RX ADMIN — INSULIN GLARGINE 30 UNIT(S): 100 INJECTION, SOLUTION SUBCUTANEOUS at 00:28

## 2024-04-21 RX ADMIN — Medication 40 MILLIGRAM(S): at 06:45

## 2024-04-21 RX ADMIN — INSULIN GLARGINE 30 UNIT(S): 100 INJECTION, SOLUTION SUBCUTANEOUS at 21:11

## 2024-04-21 RX ADMIN — GABAPENTIN 300 MILLIGRAM(S): 400 CAPSULE ORAL at 13:51

## 2024-04-21 NOTE — PROGRESS NOTE ADULT - ASSESSMENT
Patient is a 64 y/o Male who was under ER observation but now admitted to hospitalist team , pt. with acute chf with reduced EF. pt. 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 PAD s/p LE stent, right carotid endartectomy, small bowel AVM (no longer on plavix) who presented to the ED with complaints of leg swelling, shortness of breath, and orthopnea    Acute on chronic heart failure with reduced EF (40-45%)  - cont IV lasix 40 mg bid  - cont entresto  - cont metoprolol ER 25 mg daily  - monitor lytes and I+O, daily weights  - cardiology following      History of CABG, PCI  History of MV repair  History of KOFI clip  Chronic Atrial Fibrillation   - cont eliquis, lipitor, metoprolol    Hypothyroidism (s/p grave's disease)  - cont levothyroxine    DVT PPx  - eliquis    Dispo - diuresing Patient is a 62 y/o Male who was under ER observation but now admitted to hospitalist team , pt. with acute chf with reduced EF. pt. 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 PAD s/p LE stent, right carotid endartectomy, small bowel AVM (no longer on plavix) who presented to the ED with complaints of leg swelling, shortness of breath, and orthopnea    Acute on chronic heart failure with reduced EF (40-45%)  - cont IV lasix 40 mg bid  - cont entresto  - cont metoprolol ER 25 mg daily  - monitor lytes and I+O, daily weights  - cardiology following, Patient's heart failure special to see patient tomorrow      History of CABG, PCI  History of MV repair  History of KOFI clip  Chronic Atrial Fibrillation   - cont eliquis, lipitor, metoprolol    Hypothyroidism (s/p grave's disease)  - cont levothyroxine    DVT PPx  - eliquis    Dispo - diuresing

## 2024-04-21 NOTE — PROGRESS NOTE ADULT - SUBJECTIVE AND OBJECTIVE BOX
[facility]                                                         CARDIOLOGY AT [facility]                                                                  [address], [place] [address]                                                         Telephone: [phone]. Fax:[phone]                                                                             PROGRESS NOTE    Reason for follow up: Acute HFrEF   Update: Pt seen and examined at the bedside. Pt is on room air now. Creatinine uptrending from 1.48 to 1.67.     Review of symptoms:   Cardiac:  No chest pain. No dyspnea. No palpitations.  Respiratory: no cough. No dyspnea  Gastrointestinal: No diarrhea. No abdominal pain. No bleeding.   Neuro: No focal neuro complaints.    Vitals:  T(C): 36.4 (04-21-24 @ 11:51), Max: 36.6 (04-20-24 @ 21:41)  HR: 72 (04-21-24 @ 11:51) (69 - 82)  BP: 120/63 (04-21-24 @ 11:51) (114/59 - 167/94)  RR: 17 (04-21-24 @ 11:51) (17 - 19)  SpO2: 92% (04-21-24 @ 11:51) (90% - 94%)  Wt(kg): --  I&O's Summary    20 Apr 2024 07:01  -  21 Apr 2024 07:00  --------------------------------------------------------  IN: 240 mL / OUT: 1100 mL / NET: -860 mL      Weight (kg): 109.8 (04-19 @ 15:14)    PHYSICAL EXAM:  Appearance: Comfortable. No acute distress  HEENT:  Atraumatic. Normocephalic.  Normal oral mucosa  Neurologic: A & O x 3, no gross focal deficits.  Cardiovascular: RRR S1 S2, No murmur, no rubs/gallops. +JVD  Respiratory: Lungs clear to auscultation, unlabored   Gastrointestinal:  Soft, Non-tender, + BS. distended.   Lower Extremities: 2+ Peripheral Pulses, No clubbing, cyanosis. +2 B/L LE edema  Psychiatry: Patient is calm. No agitation.   Skin: warm and dry.    CURRENT CARDIAC MEDICATIONS:  furosemide   Injectable 40 milliGRAM(s) IV Push two times a day  metoprolol succinate ER 25 milliGRAM(s) Oral daily  sacubitril 24 mG/valsartan 26 mG 1 Tablet(s) Oral two times a day      CURRENT OTHER MEDICATIONS:  albuterol    0.083% 2.5 milliGRAM(s) Nebulizer every 4 hours PRN Shortness of Breath and/or Wheezing  albuterol    0.083% 2.5 milliGRAM(s) Nebulizer every 6 hours  albuterol    90 MICROgram(s) HFA Inhaler 1 Puff(s) Inhalation every 4 hours  gabapentin 300 milliGRAM(s) Oral every 8 hours  atorvastatin 40 milliGRAM(s) Oral at bedtime  dextrose 50% Injectable 25 Gram(s) IV Push once, Stop order after: 1 Doses  dextrose 50% Injectable 12.5 Gram(s) IV Push once, Stop order after: 1 Doses  dextrose Oral Gel 15 Gram(s) Oral once, Stop order after: 1 Doses PRN Blood Glucose LESS THAN 70 milliGRAM(s)/deciliter  glucagon  Injectable 1 milliGRAM(s) IntraMuscular once, Stop order after: 1 Doses  insulin glargine Injectable (LANTUS) 30 Unit(s) SubCutaneous at bedtime  insulin lispro (ADMELOG) corrective regimen sliding scale   SubCutaneous three times a day before meals  insulin lispro (ADMELOG) corrective regimen sliding scale   SubCutaneous at bedtime  levothyroxine 150 MICROGram(s) Oral daily  apixaban 5 milliGRAM(s) Oral every 12 hours  dextrose 10% Bolus 125 milliLiter(s) IV Bolus once, Stop order after: 1 Doses  dextrose 5%. 1000 milliLiter(s) (100 mL/Hr) IV Continuous <Continuous>  dextrose 5%. 1000 milliLiter(s) (50 mL/Hr) IV Continuous <Continuous>      LABS:	 	             11.7   7.07  )-----------( 175      ( 21 Apr 2024 03:44 )             35.7     04-21    134<L>  |  97  |  45.2<H>  ----------------------------<  278<H>  4.9   |  25.0  |  1.67<H>    Ca    8.7      21 Apr 2024 03:44  Mg     2.0     04-21    TPro  7.3  /  Alb  3.8  /  TBili  0.5  /  DBili  x   /  AST  22  /  ALT  20  /  AlkPhos  84  04-21      Lipid Profile: Date: 04-21 @ 03:44  Total cholesterol 110; Direct LDL: --; HDL: 42; Triglycerides:59    TELEMETRY: Afib   ECG: Afib     DIAGNOSTIC TESTING:  [ x] Echocardiogram: < from: TTE W or WO Ultrasound Enhancing Agent (04.19.24 @ 19:00) >      1. Septal motion is abnormal consistent with previous cardiac surgery. Left ventricular systolic function is moderately decreased with an ejection fraction visually estimated at 35 to 40 %.   2. Mild left ventricular hypertrophy.   3. The left ventricular diastolic function is indeterminate, with indeterminant filling pressure.   4. Moderately enlarged right ventricular cavity size and moderately reduced systolic function.   5. Estimated pulmonary artery systolic pressure is 36 mmHg.   6. Moderate tricuspid regurgitation.   7. Mitral annuloplasty ring present with mild residual regurgitation.   8. Mild pulmonic regurgitation.   9. The left atrium is normal in size.  10. The right atrium is mildly dilated.  11. No pericardial effusion seen.  12. Compared to the transthoracic echocardiogram performed on 3/2023, prior LV EF reportedly of 40-45%.    < end of copied text >    [ ]  Catheterization:  [ ] Stress Test:    OTHER:

## 2024-04-21 NOTE — PROGRESS NOTE ADULT - ASSESSMENT
Patient is a 64 y/o M with hx of CAD s/p CABG x 1, MV repair, KOFI clip/MAZE (2003), PCI with DARYA x 1 Ramus (2021), HFrEF (EF 40-45%), Afib (on Eliquis), chronotropic incompetence s/p dual chamber ICD, MRI concerning with cardiac sarcoidosis, VF s/p ICD, IDDMII, Grave's disease s/p radioactive iodine, MAEGAN, COPD, PAD s/p LE stent, right carotid endartectomy, GI bleed (no longer on plavix) who presented to the ED with complaints of leg swelling, shortness of breath, and orthopnea. Patient states that for the last month he has been experiencing worsening dyspnea on exertion and leg swelling. Patient was seen by Dr. Kirkland 04/03/24 and referred to [facility] for worsening symptoms, he was diagnosed with PNA there, and discharged home the following day. Patient states that ever since then his symptoms have gotten worse despite taking his lasix 40mg daily. Patient called Dr. Kirkland's office and increased his lasix to 60mg for the last 2 days, but when he also had some chest pressure and dyspnea at rest he came to the ER to be evaluated. Patient currently denies fevers, chills, CP, syncope, near syncope, N/V/D, headache, or dizziness.  Troponin 28  pBNP 1072

## 2024-04-21 NOTE — PROGRESS NOTE ADULT - SUBJECTIVE AND OBJECTIVE BOX
Haroon Ballard MD  [facility] Medicine  Contact via Teams or text/call at [phone]    Patient is a 63y old  Male who presents with a chief complaint of acute chf (20 Apr 2024 22:48)      Patient seen and examined at bedside. No overnight events reported.     ALLERGIES:  penicillins (Hives)    MEDICATIONS  (STANDING):  albuterol    0.083% 2.5 milliGRAM(s) Nebulizer every 6 hours  albuterol    90 MICROgram(s) HFA Inhaler 1 Puff(s) Inhalation every 4 hours  apixaban 5 milliGRAM(s) Oral every 12 hours  atorvastatin 40 milliGRAM(s) Oral at bedtime  dextrose 10% Bolus 125 milliLiter(s) IV Bolus once  dextrose 5%. 1000 milliLiter(s) (100 mL/Hr) IV Continuous <Continuous>  dextrose 5%. 1000 milliLiter(s) (50 mL/Hr) IV Continuous <Continuous>  dextrose 50% Injectable 25 Gram(s) IV Push once  dextrose 50% Injectable 12.5 Gram(s) IV Push once  furosemide   Injectable 40 milliGRAM(s) IV Push two times a day  gabapentin 300 milliGRAM(s) Oral every 8 hours  glucagon  Injectable 1 milliGRAM(s) IntraMuscular once  insulin glargine Injectable (LANTUS) 30 Unit(s) SubCutaneous at bedtime  insulin lispro (ADMELOG) corrective regimen sliding scale   SubCutaneous at bedtime  insulin lispro (ADMELOG) corrective regimen sliding scale   SubCutaneous three times a day before meals  levothyroxine 150 MICROGram(s) Oral daily  metoprolol succinate ER 25 milliGRAM(s) Oral daily  sacubitril 24 mG/valsartan 26 mG 1 Tablet(s) Oral two times a day    MEDICATIONS  (PRN):  albuterol    0.083% 2.5 milliGRAM(s) Nebulizer every 4 hours PRN Shortness of Breath and/or Wheezing  dextrose Oral Gel 15 Gram(s) Oral once PRN Blood Glucose LESS THAN 70 milliGRAM(s)/deciliter    Vital Signs Last 24 Hrs  T(F): 97.6 (21 Apr 2024 05:01), Max: 97.8 (20 Apr 2024 21:41)  HR: 76 (21 Apr 2024 05:01) (72 - 82)  BP: 125/66 (21 Apr 2024 05:01) (114/59 - 167/94)  RR: 17 (21 Apr 2024 05:01) (17 - 19)  SpO2: 90% (21 Apr 2024 05:01) (90% - 94%)  I&O's Summary    20 Apr 2024 07:01  -  21 Apr 2024 07:00  --------------------------------------------------------  IN: 240 mL / OUT: 1100 mL / NET: -860 mL      PHYSICAL EXAM:  General: NAD, A/O x 3  ENT: No gross hearing impairment, Moist mucous membranes, no thrush  Neck: Supple, No JVD  Lungs: Clear to auscultation bilaterally, good air entry, non-labored breathing  Cardio: RRR, S1/S2, No murmur  Abdomen: Soft, Nontender, Nondistended; Bowel sounds present  Extremities: No calf tenderness, No cyanosis, 2+ pitting edema bilateral LE  Psych: Appropriate mood and affect    LABS:                        11.7   7.07  )-----------( 175      ( 21 Apr 2024 03:44 )             35.7     04-21    134  |  97  |  45.2  ----------------------------<  278  4.9   |  25.0  |  1.67    Ca    8.7      21 Apr 2024 03:44  Mg     2.0     04-21    TPro  7.3  /  Alb  3.8  /  TBili  0.5  /  DBili  x   /  AST  22  /  ALT  20  /  AlkPhos  84  04-21    04-21 Chol 110 mg/dL LDL -- HDL 42 mg/dL Trig 59 mg/dL    POCT Blood Glucose.: 216 mg/dL (21 Apr 2024 09:32)  POCT Blood Glucose.: 222 mg/dL (20 Apr 2024 21:38)  POCT Blood Glucose.: 264 mg/dL (20 Apr 2024 16:57)  POCT Blood Glucose.: 293 mg/dL (20 Apr 2024 12:10)    Urinalysis Basic - ( 21 Apr 2024 03:44 )    Color: x / Appearance: x / SG: x / pH: x  Gluc: 278 mg/dL / Ketone: x  / Bili: x / Urobili: x   Blood: x / Protein: x / Nitrite: x   Leuk Esterase: x / RBC: x / WBC x   Sq Epi: x / Non Sq Epi: x / Bacteria: x    RADIOLOGY & ADDITIONAL TESTS:    Care Discussed with Consultants/Other Providers:    Haroon Ballard MD  [facility] Medicine  Contact via Teams or text/call at [phone]    Patient is a 63y old  Male who presents with a chief complaint of acute chf (20 Apr 2024 22:48)    agitated.  "ANOTHER DOCTOR!"    Patient seen and examined at bedside. No overnight events reported.     ALLERGIES:  penicillins (Hives)    MEDICATIONS  (STANDING):  albuterol    0.083% 2.5 milliGRAM(s) Nebulizer every 6 hours  albuterol    90 MICROgram(s) HFA Inhaler 1 Puff(s) Inhalation every 4 hours  apixaban 5 milliGRAM(s) Oral every 12 hours  atorvastatin 40 milliGRAM(s) Oral at bedtime  dextrose 10% Bolus 125 milliLiter(s) IV Bolus once  dextrose 5%. 1000 milliLiter(s) (100 mL/Hr) IV Continuous <Continuous>  dextrose 5%. 1000 milliLiter(s) (50 mL/Hr) IV Continuous <Continuous>  dextrose 50% Injectable 25 Gram(s) IV Push once  dextrose 50% Injectable 12.5 Gram(s) IV Push once  furosemide   Injectable 40 milliGRAM(s) IV Push two times a day  gabapentin 300 milliGRAM(s) Oral every 8 hours  glucagon  Injectable 1 milliGRAM(s) IntraMuscular once  insulin glargine Injectable (LANTUS) 30 Unit(s) SubCutaneous at bedtime  insulin lispro (ADMELOG) corrective regimen sliding scale   SubCutaneous at bedtime  insulin lispro (ADMELOG) corrective regimen sliding scale   SubCutaneous three times a day before meals  levothyroxine 150 MICROGram(s) Oral daily  metoprolol succinate ER 25 milliGRAM(s) Oral daily  sacubitril 24 mG/valsartan 26 mG 1 Tablet(s) Oral two times a day    MEDICATIONS  (PRN):  albuterol    0.083% 2.5 milliGRAM(s) Nebulizer every 4 hours PRN Shortness of Breath and/or Wheezing  dextrose Oral Gel 15 Gram(s) Oral once PRN Blood Glucose LESS THAN 70 milliGRAM(s)/deciliter    Vital Signs Last 24 Hrs  T(F): 97.6 (21 Apr 2024 05:01), Max: 97.8 (20 Apr 2024 21:41)  HR: 76 (21 Apr 2024 05:01) (72 - 82)  BP: 125/66 (21 Apr 2024 05:01) (114/59 - 167/94)  RR: 17 (21 Apr 2024 05:01) (17 - 19)  SpO2: 90% (21 Apr 2024 05:01) (90% - 94%)  I&O's Summary    20 Apr 2024 07:01  -  21 Apr 2024 07:00  --------------------------------------------------------  IN: 240 mL / OUT: 1100 mL / NET: -860 mL      PHYSICAL EXAM:  General: agitated and does not want to talk.  Declined examination      LABS:                        11.7   7.07  )-----------( 175      ( 21 Apr 2024 03:44 )             35.7     04-21    134  |  97  |  45.2  ----------------------------<  278  4.9   |  25.0  |  1.67    Ca    8.7      21 Apr 2024 03:44  Mg     2.0     04-21    TPro  7.3  /  Alb  3.8  /  TBili  0.5  /  DBili  x   /  AST  22  /  ALT  20  /  AlkPhos  84  04-21    04-21 Chol 110 mg/dL LDL -- HDL 42 mg/dL Trig 59 mg/dL    POCT Blood Glucose.: 216 mg/dL (21 Apr 2024 09:32)  POCT Blood Glucose.: 222 mg/dL (20 Apr 2024 21:38)  POCT Blood Glucose.: 264 mg/dL (20 Apr 2024 16:57)  POCT Blood Glucose.: 293 mg/dL (20 Apr 2024 12:10)    Urinalysis Basic - ( 21 Apr 2024 03:44 )    Color: x / Appearance: x / SG: x / pH: x  Gluc: 278 mg/dL / Ketone: x  / Bili: x / Urobili: x   Blood: x / Protein: x / Nitrite: x   Leuk Esterase: x / RBC: x / WBC x   Sq Epi: x / Non Sq Epi: x / Bacteria: x    RADIOLOGY & ADDITIONAL TESTS:    Care Discussed with Consultants/Other Providers:

## 2024-04-21 NOTE — PATIENT PROFILE ADULT - FALL HARM RISK - HARM RISK INTERVENTIONS

## 2024-04-22 ENCOUNTER — APPOINTMENT (OUTPATIENT)
Dept: ENDOCRINOLOGY | Facility: CLINIC | Age: 64
End: 2024-04-22

## 2024-04-22 DIAGNOSIS — I48.0 PAROXYSMAL ATRIAL FIBRILLATION: ICD-10-CM

## 2024-04-22 DIAGNOSIS — N17.9 ACUTE KIDNEY FAILURE, UNSPECIFIED: ICD-10-CM

## 2024-04-22 LAB
ANION GAP SERPL CALC-SCNC: 11 MMOL/L — SIGNIFICANT CHANGE UP (ref 5–17)
BUN SERPL-MCNC: 55 MG/DL — HIGH (ref 8–20)
CALCIUM SERPL-MCNC: 9 MG/DL — SIGNIFICANT CHANGE UP (ref 8.4–10.5)
CHLORIDE SERPL-SCNC: 101 MMOL/L — SIGNIFICANT CHANGE UP (ref 96–108)
CO2 SERPL-SCNC: 27 MMOL/L — SIGNIFICANT CHANGE UP (ref 22–29)
CREAT SERPL-MCNC: 1.69 MG/DL — HIGH (ref 0.5–1.3)
EGFR: 45 ML/MIN/1.73M2 — LOW
GLUCOSE BLDC GLUCOMTR-MCNC: 144 MG/DL — HIGH (ref 70–99)
GLUCOSE BLDC GLUCOMTR-MCNC: 232 MG/DL — HIGH (ref 70–99)
GLUCOSE BLDC GLUCOMTR-MCNC: 266 MG/DL — HIGH (ref 70–99)
GLUCOSE BLDC GLUCOMTR-MCNC: 394 MG/DL — HIGH (ref 70–99)
GLUCOSE SERPL-MCNC: 155 MG/DL — HIGH (ref 70–99)
HBA1C MFR BLD HPLC: 8.3
MAGNESIUM SERPL-MCNC: 2.3 MG/DL — SIGNIFICANT CHANGE UP (ref 1.6–2.6)
POTASSIUM SERPL-MCNC: 4.9 MMOL/L — SIGNIFICANT CHANGE UP (ref 3.5–5.3)
POTASSIUM SERPL-SCNC: 4.9 MMOL/L — SIGNIFICANT CHANGE UP (ref 3.5–5.3)
SODIUM SERPL-SCNC: 139 MMOL/L — SIGNIFICANT CHANGE UP (ref 135–145)

## 2024-04-22 PROCEDURE — 76705 ECHO EXAM OF ABDOMEN: CPT | Mod: 26

## 2024-04-22 PROCEDURE — 99232 SBSQ HOSP IP/OBS MODERATE 35: CPT

## 2024-04-22 PROCEDURE — 99223 1ST HOSP IP/OBS HIGH 75: CPT

## 2024-04-22 RX ORDER — INSULIN GLARGINE 100 [IU]/ML
30 INJECTION, SOLUTION SUBCUTANEOUS
Refills: 0 | DISCHARGE

## 2024-04-22 RX ORDER — GABAPENTIN 400 MG/1
1 CAPSULE ORAL
Refills: 0 | DISCHARGE

## 2024-04-22 RX ORDER — SODIUM CHLORIDE 9 MG/ML
1000 INJECTION, SOLUTION INTRAVENOUS
Refills: 0 | Status: DISCONTINUED | OUTPATIENT
Start: 2024-04-22 | End: 2024-04-25

## 2024-04-22 RX ORDER — DEXTROSE 50 % IN WATER 50 %
25 SYRINGE (ML) INTRAVENOUS ONCE
Refills: 0 | Status: DISCONTINUED | OUTPATIENT
Start: 2024-04-22 | End: 2024-04-25

## 2024-04-22 RX ORDER — DEXTROSE 50 % IN WATER 50 %
15 SYRINGE (ML) INTRAVENOUS ONCE
Refills: 0 | Status: DISCONTINUED | OUTPATIENT
Start: 2024-04-22 | End: 2024-04-25

## 2024-04-22 RX ORDER — DEXTROSE 10 % IN WATER 10 %
125 INTRAVENOUS SOLUTION INTRAVENOUS ONCE
Refills: 0 | Status: DISCONTINUED | OUTPATIENT
Start: 2024-04-22 | End: 2024-04-25

## 2024-04-22 RX ORDER — INSULIN LISPRO 100/ML
5 VIAL (ML) SUBCUTANEOUS
Refills: 0 | Status: DISCONTINUED | OUTPATIENT
Start: 2024-04-22 | End: 2024-04-25

## 2024-04-22 RX ORDER — INSULIN LISPRO 100/ML
VIAL (ML) SUBCUTANEOUS
Refills: 0 | Status: DISCONTINUED | OUTPATIENT
Start: 2024-04-22 | End: 2024-04-25

## 2024-04-22 RX ORDER — ALBUTEROL 90 UG/1
2 AEROSOL, METERED ORAL
Refills: 0 | DISCHARGE

## 2024-04-22 RX ORDER — INSULIN LISPRO 100/ML
VIAL (ML) SUBCUTANEOUS AT BEDTIME
Refills: 0 | Status: DISCONTINUED | OUTPATIENT
Start: 2024-04-22 | End: 2024-04-25

## 2024-04-22 RX ORDER — APIXABAN 2.5 MG/1
1 TABLET, FILM COATED ORAL
Refills: 0 | DISCHARGE

## 2024-04-22 RX ORDER — GLUCAGON INJECTION, SOLUTION 0.5 MG/.1ML
1 INJECTION, SOLUTION SUBCUTANEOUS ONCE
Refills: 0 | Status: DISCONTINUED | OUTPATIENT
Start: 2024-04-22 | End: 2024-04-25

## 2024-04-22 RX ORDER — DEXTROSE 50 % IN WATER 50 %
12.5 SYRINGE (ML) INTRAVENOUS ONCE
Refills: 0 | Status: DISCONTINUED | OUTPATIENT
Start: 2024-04-22 | End: 2024-04-25

## 2024-04-22 RX ADMIN — GABAPENTIN 300 MILLIGRAM(S): 400 CAPSULE ORAL at 05:36

## 2024-04-22 RX ADMIN — APIXABAN 5 MILLIGRAM(S): 2.5 TABLET, FILM COATED ORAL at 05:35

## 2024-04-22 RX ADMIN — Medication 40 MILLIGRAM(S): at 12:26

## 2024-04-22 RX ADMIN — Medication 40 MILLIGRAM(S): at 12:28

## 2024-04-22 RX ADMIN — SACUBITRIL AND VALSARTAN 1 TABLET(S): 24; 26 TABLET, FILM COATED ORAL at 05:36

## 2024-04-22 RX ADMIN — Medication 150 MICROGRAM(S): at 05:36

## 2024-04-22 RX ADMIN — GABAPENTIN 300 MILLIGRAM(S): 400 CAPSULE ORAL at 22:01

## 2024-04-22 RX ADMIN — GABAPENTIN 300 MILLIGRAM(S): 400 CAPSULE ORAL at 12:28

## 2024-04-22 RX ADMIN — Medication 5 UNIT(S): at 12:27

## 2024-04-22 RX ADMIN — Medication 6: at 12:27

## 2024-04-22 RX ADMIN — Medication 4: at 17:08

## 2024-04-22 RX ADMIN — SACUBITRIL AND VALSARTAN 1 TABLET(S): 24; 26 TABLET, FILM COATED ORAL at 17:08

## 2024-04-22 RX ADMIN — ATORVASTATIN CALCIUM 40 MILLIGRAM(S): 80 TABLET, FILM COATED ORAL at 22:01

## 2024-04-22 RX ADMIN — ALBUTEROL 2.5 MILLIGRAM(S): 90 AEROSOL, METERED ORAL at 05:40

## 2024-04-22 RX ADMIN — ALBUTEROL 2.5 MILLIGRAM(S): 90 AEROSOL, METERED ORAL at 09:57

## 2024-04-22 RX ADMIN — Medication 25 MILLIGRAM(S): at 05:35

## 2024-04-22 RX ADMIN — INSULIN GLARGINE 30 UNIT(S): 100 INJECTION, SOLUTION SUBCUTANEOUS at 22:01

## 2024-04-22 RX ADMIN — Medication 6: at 22:01

## 2024-04-22 RX ADMIN — Medication 40 MILLIGRAM(S): at 05:36

## 2024-04-22 RX ADMIN — Medication 5 UNIT(S): at 17:08

## 2024-04-22 NOTE — CONSULT NOTE ADULT - PROBLEM SELECTOR RECOMMENDATION 3
- Currently rate controlled.   - Restart toprol XL 25mg PO qday in a day or so.   - Continue Eliquis 5mg PO BID.   - If any procedures are needed while in the hospital, can anticoagulate with lovenox or heparin gtt.   - Continue telemetry monitoring.
-Recent baseline Cr ~1.4-1.59  -Monitor renal function closely with diuresis  -Avoid nephrotoxins

## 2024-04-22 NOTE — CONSULT NOTE ADULT - NS ATTEND AMEND GEN_ALL_CORE FT
Patient seen and examined at bedside with complaints of exertional shortness of breath lower extremity edema and orthopnea    Acute decompensated HFmrEF (EF 40-45%)   Acutely decompensated, exertional shortness of breath lower extremity edema and orthopnea  Echo 03/23 with EF 40-45%, mild LVH, mild MR, mild to mod TR, moderate pHTN  Obtain TTE to assess LV function and valvulopathy   - Lasix 80mg IV x 1 today and Lasix 40mg IV BID starting tomorrow.   - Continue home Entresto and eplerenone.   - Not on beta blocker, previously on Toprol XL 25mg PO qday, avoid starting at this time while decompensated.   - telemetry monitoring, monitor I/Os, monitor K and Cr    CAD s/p CABG x 1 (Graft to D1) and PCI with DARYA x 1 to pRamus 2021.   - Not on aspirin due to GI bleed but still on Eliquis?   - Records showed on Plavix as of 03/23?  - Will need records from primary Cardiologist Dr. Brandi Perez on 04/22/24.     Mariel De Anda D.O. [facility]  Cardiology/Vascular Cardiology -[facility] Cardiology   Telephone # [phone]
Patient comes in with worsening SOB. He has been started on IV diuretics and he feels much improved  Will obtain an ultrasounds on his abdomen to rule out ascites  Given his recurrent issues with SOB, will plan for a LHC (last one was 10 years ago) and RHC with cardiomems placement

## 2024-04-22 NOTE — PROGRESS NOTE ADULT - ASSESSMENT
Patient is a 62 y/o Male who was under ER observation but now admitted to hospitalist team , pt. with acute chf with reduced EF. pt. 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 PAD s/p LE stent, right carotid endartectomy, small bowel AVM (no longer on plavix) who presented to the ED with complaints of leg swelling, shortness of breath, and orthopnea    Acute on chronic heart failure with reduced EF (40-45%)  - cont IV lasix 40 mg bid  - cont entresto  - cont metoprolol ER 25 mg daily  - monitor lytes and I+O, daily weights  - cardiology following, PDr. [facility] HF specialist to see today     Suspected COPD exacerbation  - with bilateral wheezes  - could be a component of copd  - given 1 dose iv steroids and cont with oral steroids tomorro  - cont breathing treatments      History of CABG, PCI  History of MV repair  History of KOFI clip  Chronic Atrial Fibrillation   - cont eliquis, lipitor, metoprolol    Hypothyroidism (s/p grave's disease)  - cont levothyroxine    DVT PPx  - eliquis    Dispo - diuresing

## 2024-04-22 NOTE — CONSULT NOTE ADULT - PROBLEM SELECTOR RECOMMENDATION 2
-s/p CABG, last angiogram was 4/2021 but given worsening symptoms and slight decline in LVEF will repeat [facility]  -He was taken off antiplatelet therapy following admission for GIB at [facility].  -C/w statin, BB
- Hx of CAD s/p CABG x 1 (Graft to D1) and PCI with DARYA x 1 to pRamus 2021.   - Not on aspirin due to GI bleed but still on Eliquis?   - Records showed on plavix as of 03/23?  - Will need records from primary Cardiologist Dr. Brandi Perez on 04/22/24.   - Continue Entresto.   - Check fasting lipid profile in AM, not on a statin?  - Obtain TTE.   - Ischemic evaluation on Monday pending aforementioned workup.

## 2024-04-22 NOTE — SBIRT NOTE ADULT - NSSBIRTALCACTION/INTER_GEN_A_CORE
Positive reinforcement Topical Clindamycin Counseling: Patient counseled that this medication may cause skin irritation or allergic reactions.  In the event of skin irritation, the patient was advised to reduce the amount of the drug applied or use it less frequently.   The patient verbalized understanding of the proper use and possible adverse effects of clindamycin.  All of the patient's questions and concerns were addressed.

## 2024-04-22 NOTE — PROGRESS NOTE ADULT - SUBJECTIVE AND OBJECTIVE BOX
Haroon Ballard MD  [facility] Medicine  Contact via Teams or text/call at [phone]    Patient is a 63y old  Male who presents with a chief complaint of acute chf (21 Apr 2024 12:06)    Feels a little better.  Still with significant shortness of breath.      Patient seen and examined at bedside. No overnight events reported.     ALLERGIES:  penicillins (Hives)    MEDICATIONS  (STANDING):  albuterol    0.083% 2.5 milliGRAM(s) Nebulizer every 6 hours  albuterol    90 MICROgram(s) HFA Inhaler 1 Puff(s) Inhalation every 4 hours  apixaban 5 milliGRAM(s) Oral every 12 hours  atorvastatin 40 milliGRAM(s) Oral at bedtime  dextrose 10% Bolus 125 milliLiter(s) IV Bolus once  dextrose 5%. 1000 milliLiter(s) (50 mL/Hr) IV Continuous <Continuous>  dextrose 5%. 1000 milliLiter(s) (100 mL/Hr) IV Continuous <Continuous>  dextrose 50% Injectable 25 Gram(s) IV Push once  dextrose 50% Injectable 12.5 Gram(s) IV Push once  furosemide   Injectable 40 milliGRAM(s) IV Push two times a day  gabapentin 300 milliGRAM(s) Oral every 8 hours  glucagon  Injectable 1 milliGRAM(s) IntraMuscular once  insulin glargine Injectable (LANTUS) 30 Unit(s) SubCutaneous at bedtime  insulin lispro (ADMELOG) corrective regimen sliding scale   SubCutaneous three times a day before meals  insulin lispro (ADMELOG) corrective regimen sliding scale   SubCutaneous at bedtime  insulin lispro Injectable (ADMELOG) 5 Unit(s) SubCutaneous three times a day before meals  levothyroxine 150 MICROGram(s) Oral daily  methylPREDNISolone sodium succinate Injectable 40 milliGRAM(s) IV Push once  metoprolol succinate ER 25 milliGRAM(s) Oral daily  sacubitril 24 mG/valsartan 26 mG 1 Tablet(s) Oral two times a day    MEDICATIONS  (PRN):  albuterol    0.083% 2.5 milliGRAM(s) Nebulizer every 4 hours PRN Shortness of Breath and/or Wheezing  dextrose Oral Gel 15 Gram(s) Oral once PRN Blood Glucose LESS THAN 70 milliGRAM(s)/deciliter    Vital Signs Last 24 Hrs  T(F): 97.6 (22 Apr 2024 11:25), Max: 98.2 (21 Apr 2024 16:07)  HR: 71 (22 Apr 2024 11:25) (64 - 76)  BP: 132/61 (22 Apr 2024 11:25) (116/54 - 150/69)  RR: 16 (22 Apr 2024 11:25) (16 - 18)  SpO2: 92% (22 Apr 2024 11:25) (90% - 96%)  I&O's Summary    21 Apr 2024 07:01  -  22 Apr 2024 07:00  --------------------------------------------------------  IN: 0 mL / OUT: 800 mL / NET: -800 mL    22 Apr 2024 07:01  -  22 Apr 2024 11:29  --------------------------------------------------------  IN: 0 mL / OUT: 450 mL / NET: -450 mL      PHYSICAL EXAM:  General: NAD, A/O x 3  ENT: No gross hearing impairment, Moist mucous membranes, no thrush  Neck: Supple, No JVD  Lungs: diminished and wheezes bilaterally, non-labored breathing  Cardio: RRR, S1/S2, No murmur  Abdomen: Soft, distended, nontender; Bowel sounds present  Extremities: No calf tenderness, No cyanosis, No pitting edema  Psych: Appropriate mood and affect    LABS:                        11.7   7.07  )-----------( 175      ( 21 Apr 2024 03:44 )             35.7     04-22    139  |  101  |  55.0  ----------------------------<  155  4.9   |  27.0  |  1.69    Ca    9.0      22 Apr 2024 05:10  Mg     2.3     04-22    TPro  7.3  /  Alb  3.8  /  TBili  0.5  /  DBili  x   /  AST  22  /  ALT  20  /  AlkPhos  84  04-21 04-21 Chol 110 mg/dL LDL -- HDL 42 mg/dL Trig 59 mg/dL    POCT Blood Glucose.: 144 mg/dL (22 Apr 2024 08:08)  POCT Blood Glucose.: 306 mg/dL (21 Apr 2024 21:08)  POCT Blood Glucose.: 238 mg/dL (21 Apr 2024 16:58)  POCT Blood Glucose.: 229 mg/dL (21 Apr 2024 12:08)    Urinalysis Basic - ( 22 Apr 2024 05:10 )    Color: x / Appearance: x / SG: x / pH: x  Gluc: 155 mg/dL / Ketone: x  / Bili: x / Urobili: x   Blood: x / Protein: x / Nitrite: x   Leuk Esterase: x / RBC: x / WBC x   Sq Epi: x / Non Sq Epi: x / Bacteria: x    RADIOLOGY & ADDITIONAL TESTS:    Care Discussed with Consultants/Other Providers:

## 2024-04-22 NOTE — CONSULT NOTE ADULT - PROBLEM SELECTOR RECOMMENDATION 9
- Acutely decompensated.   - Echo 03/23 with EF 40-45%, mild LVH, mild MR, mild to mod TR, moderate pHTN  - Obtain TTE.   - Lasix 80mg IV x 1 today.   - Lasix 40mg IV BID starting tomorrow.   - Continue home Entresto and eplerenone.   - Not on beta blocker, previously on Toprol XL 25mg PO qday, avoid starting at this time while decompensated.   - Restart Toprol XL 25mg PO Qday in a day or 2.  - Monitor on telemetry.    - Strict i/o and daily weights.    - Keep K > 4, Mg > 2.   - Monitor renal function with ongoing diuresis.
-LVEF now 35-40% from 40-45%  -There was a concern for sarcoid on cardiac MRI and h/o VT. Will reorder PET as an outpatient.  -GDMT: Continue Toprol-XL 25mg nightly and Entresto 24-26mg BID (also on Eplerenone 25mg daily at home)  -Diuretics: Continue Lasix 40 mg IV BID.  -Device: s/p ICD, followed by Dr. Hutchinson  -Advanced Therapies: Will plan for CPET for risk stratification once GDMT optimized  -MAEGAN: Pt states he has a mask but does not use it. Educated regarding importance of sleep apnea treatment. Referred back to Pulmonology for f/u.  -Social History: Is  from his wife, denies smoking, social alcohol use. Smokes marijuana for sleep.  -Abd US to r/o ascites  -If renal function is improved/stable tomorrow, will schedule him for R/LHC with CardioMEMS implant to prevent recurrent HF hospitalizations.

## 2024-04-23 LAB
ALBUMIN SERPL ELPH-MCNC: 4.1 G/DL — SIGNIFICANT CHANGE UP (ref 3.3–5.2)
ALP SERPL-CCNC: 84 U/L — SIGNIFICANT CHANGE UP (ref 40–120)
ALT FLD-CCNC: 25 U/L — SIGNIFICANT CHANGE UP
ANION GAP SERPL CALC-SCNC: 9 MMOL/L — SIGNIFICANT CHANGE UP (ref 5–17)
AST SERPL-CCNC: 24 U/L — SIGNIFICANT CHANGE UP
BILIRUB SERPL-MCNC: 0.5 MG/DL — SIGNIFICANT CHANGE UP (ref 0.4–2)
BUN SERPL-MCNC: 52.7 MG/DL — HIGH (ref 8–20)
CALCIUM SERPL-MCNC: 9.2 MG/DL — SIGNIFICANT CHANGE UP (ref 8.4–10.5)
CHLORIDE SERPL-SCNC: 98 MMOL/L — SIGNIFICANT CHANGE UP (ref 96–108)
CO2 SERPL-SCNC: 28 MMOL/L — SIGNIFICANT CHANGE UP (ref 22–29)
CREAT SERPL-MCNC: 1.38 MG/DL — HIGH (ref 0.5–1.3)
EGFR: 58 ML/MIN/1.73M2 — LOW
GLUCOSE BLDC GLUCOMTR-MCNC: 242 MG/DL — HIGH (ref 70–99)
GLUCOSE BLDC GLUCOMTR-MCNC: 252 MG/DL — HIGH (ref 70–99)
GLUCOSE BLDC GLUCOMTR-MCNC: 273 MG/DL — HIGH (ref 70–99)
GLUCOSE BLDC GLUCOMTR-MCNC: 273 MG/DL — HIGH (ref 70–99)
GLUCOSE BLDC GLUCOMTR-MCNC: 307 MG/DL — HIGH (ref 70–99)
GLUCOSE BLDC GLUCOMTR-MCNC: 370 MG/DL — HIGH (ref 70–99)
GLUCOSE SERPL-MCNC: 259 MG/DL — HIGH (ref 70–99)
HCT VFR BLD CALC: 38.2 % — LOW (ref 39–50)
HGB BLD-MCNC: 12.7 G/DL — LOW (ref 13–17)
MAGNESIUM SERPL-MCNC: 2.4 MG/DL — SIGNIFICANT CHANGE UP (ref 1.8–2.6)
MCHC RBC-ENTMCNC: 29.3 PG — SIGNIFICANT CHANGE UP (ref 27–34)
MCHC RBC-ENTMCNC: 33.2 GM/DL — SIGNIFICANT CHANGE UP (ref 32–36)
MCV RBC AUTO: 88 FL — SIGNIFICANT CHANGE UP (ref 80–100)
PLATELET # BLD AUTO: 163 K/UL — SIGNIFICANT CHANGE UP (ref 150–400)
POTASSIUM SERPL-MCNC: 5.2 MMOL/L — SIGNIFICANT CHANGE UP (ref 3.5–5.3)
POTASSIUM SERPL-SCNC: 5.2 MMOL/L — SIGNIFICANT CHANGE UP (ref 3.5–5.3)
PROT SERPL-MCNC: 8 G/DL — SIGNIFICANT CHANGE UP (ref 6.6–8.7)
RBC # BLD: 4.34 M/UL — SIGNIFICANT CHANGE UP (ref 4.2–5.8)
RBC # FLD: 13.8 % — SIGNIFICANT CHANGE UP (ref 10.3–14.5)
SODIUM SERPL-SCNC: 135 MMOL/L — SIGNIFICANT CHANGE UP (ref 135–145)
WBC # BLD: 7.85 K/UL — SIGNIFICANT CHANGE UP (ref 3.8–10.5)
WBC # FLD AUTO: 7.85 K/UL — SIGNIFICANT CHANGE UP (ref 3.8–10.5)

## 2024-04-23 PROCEDURE — 99232 SBSQ HOSP IP/OBS MODERATE 35: CPT

## 2024-04-23 PROCEDURE — 99233 SBSQ HOSP IP/OBS HIGH 50: CPT

## 2024-04-23 RX ORDER — SACUBITRIL AND VALSARTAN 24; 26 MG/1; MG/1
1 TABLET, FILM COATED ORAL
Refills: 0 | Status: DISCONTINUED | OUTPATIENT
Start: 2024-04-23 | End: 2024-04-25

## 2024-04-23 RX ADMIN — Medication 5 UNIT(S): at 12:12

## 2024-04-23 RX ADMIN — GABAPENTIN 300 MILLIGRAM(S): 400 CAPSULE ORAL at 13:44

## 2024-04-23 RX ADMIN — Medication 8: at 12:13

## 2024-04-23 RX ADMIN — Medication 10: at 17:22

## 2024-04-23 RX ADMIN — INSULIN GLARGINE 30 UNIT(S): 100 INJECTION, SOLUTION SUBCUTANEOUS at 22:19

## 2024-04-23 RX ADMIN — GABAPENTIN 300 MILLIGRAM(S): 400 CAPSULE ORAL at 06:24

## 2024-04-23 RX ADMIN — SACUBITRIL AND VALSARTAN 1 TABLET(S): 24; 26 TABLET, FILM COATED ORAL at 06:24

## 2024-04-23 RX ADMIN — Medication 40 MILLIGRAM(S): at 22:18

## 2024-04-23 RX ADMIN — GABAPENTIN 300 MILLIGRAM(S): 400 CAPSULE ORAL at 22:19

## 2024-04-23 RX ADMIN — Medication 2: at 22:20

## 2024-04-23 RX ADMIN — Medication 25 MILLIGRAM(S): at 06:25

## 2024-04-23 RX ADMIN — Medication 40 MILLIGRAM(S): at 06:25

## 2024-04-23 RX ADMIN — SACUBITRIL AND VALSARTAN 1 TABLET(S): 24; 26 TABLET, FILM COATED ORAL at 17:29

## 2024-04-23 RX ADMIN — Medication 40 MILLIGRAM(S): at 09:28

## 2024-04-23 RX ADMIN — Medication 6: at 09:28

## 2024-04-23 RX ADMIN — Medication 5 UNIT(S): at 17:21

## 2024-04-23 RX ADMIN — Medication 150 MICROGRAM(S): at 06:24

## 2024-04-23 RX ADMIN — Medication 5 UNIT(S): at 09:27

## 2024-04-23 RX ADMIN — ATORVASTATIN CALCIUM 40 MILLIGRAM(S): 80 TABLET, FILM COATED ORAL at 22:19

## 2024-04-23 NOTE — PROGRESS NOTE ADULT - ASSESSMENT
HF: Dr. Kirkland  Primary Cards: Dr. Negrete  EP: Dr. Hutchinson     62 y/o M with PMH of CAD s/p 1v CABG with MV repair, KOFI clip and MAZE (2003), s/p DARYA to ramus (4/2021), HFmrEF (LVEF 40-45%), AFib (on Eliquis), chronotropic incompetence, s/p dual chamber ICD (given suspected sarcoidosis with LV dysfunction, Dr. Aguirre at [place] 8/2021), VF s/p ICD shock, IDDM2 (A1C 8.5% 4/21/24), Grave's disease s/p radioactive iodine with subsequent hypothyroidism, untreated MAEGAN, COPD, PAD s/p leg stent and s/p right carotid endarterectomy, who presented to the ED with complaints of leg swelling, shortness of breath, and orthopnea.     Patient states that for the last month he has been experiencing worsening dyspnea on exertion and leg swelling. Patient was seen by Dr. Kirkland 04/03/24 and referred to [facility] for worsening symptoms, he was diagnosed with PNA there, and discharged home the following day. Patient states that ever since then his symptoms have gotten worse despite taking his lasix 40mg daily. Patient called Dr. Kirkland's office and increased his lasix to 60mg for the last 2 days, but when he had some chest pressure and dyspnea at rest he came to the ER to be evaluated.     Cardiac Studies:  4/19/24 TTE: LVEF 35-40%, LVIDd 5.1cm, mild LVH, mod RVE with mod RV dysfunction, mild MR, mod TR, mild VT, PASP 36 mmHg.    3/7/23 TTE: LVEF 40-45%, LVIDd 5.38cm, mild LVH, mild MR, mild-mod TR, mod PH (PASP 52 mmHg).    4/22/21 LHC: LM normal, pLAD 40%, D1 95%, supplied by patent bypass graft, Cx minor luminal irregularities, RCA mild luminal irregularities, Mckay 80% s/p DARYA, Ao 162/70/87, LVEDP 30

## 2024-04-23 NOTE — PROGRESS NOTE ADULT - ASSESSMENT
Patient is a 64 y/o Male who was under ER observation but now admitted to hospitalist team , pt. with acute chf with reduced EF. pt. 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 PAD s/p LE stent, right carotid endartectomy, small bowel AVM (no longer on plavix) who presented to the ED with complaints of leg swelling, shortness of breath, and orthopnea    Acute on chronic heart failure with reduced EF (40-45%)  - cont IV lasix 40 mg bid  - cont entresto  - cont metoprolol ER 25  - monitor lytes and I+O, daily weights  - HF team is following, plan for Cath todayt    COPD exacerbation  -  - given 1 dose iv steroids and cont with oral steroids tomorro  - nebs      History of CABG, PCI stent  History of MV repair  History of KOFI clip  Chronic Atrial Fibrillation   PVD stent  - hold eliquis for cath  - lipitor, metoprolol    Hypothyroidism (s/p grave's disease)  - cont levothyroxine    DVT PPx  -scds    Dispo - active Patient is a 62 y/o Male who was under ER observation but now admitted to hospitalist team , pt. with acute chf with reduced EF. pt. 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 PAD s/p LE stent, right carotid endartectomy, small bowel AVM (no longer on plavix) who presented to the ED with complaints of leg swelling, shortness of breath, and orthopnea    Acute on chronic systolic heart failure   -Telemetry  -   - cont IV lasix 40 mg bid, Entresto  - cont metoprolol ER 25  -LEON EF 35-40%,MOR TR  - monitor lytes and I+O, daily weights  - HF team is following, plan for Cath tomorrow.(needs to off eliquis 48 hr)    COPD exacerbation  -  - given 1 dose iv steroids and cont with oral steroids  - nebs      History of CABG, PCI stent  History of MV repair  History of KOFI clip  Chronic Atrial Fibrillation   PVD stent  - hold eliquis for cath  - lipitor, metoprolol  - scd's    Hypothyroidism (s/p grave's disease)  - cont levothyroxine    DVT PPx  -scds    Dispo - active    Plan of care chidi pt

## 2024-04-23 NOTE — PROGRESS NOTE ADULT - SUBJECTIVE AND OBJECTIVE BOX
Interval History: no acute events    Medications:  albuterol    0.083% 2.5 milliGRAM(s) Nebulizer every 6 hours  albuterol    0.083% 2.5 milliGRAM(s) Nebulizer every 4 hours PRN  albuterol    90 MICROgram(s) HFA Inhaler 1 Puff(s) Inhalation every 4 hours  atorvastatin 40 milliGRAM(s) Oral at bedtime  dextrose 10% Bolus 125 milliLiter(s) IV Bolus once  dextrose 5%. 1000 milliLiter(s) IV Continuous <Continuous>  dextrose 5%. 1000 milliLiter(s) IV Continuous <Continuous>  dextrose 50% Injectable 25 Gram(s) IV Push once  dextrose 50% Injectable 12.5 Gram(s) IV Push once  dextrose Oral Gel 15 Gram(s) Oral once PRN  furosemide   Injectable 40 milliGRAM(s) IV Push two times a day  gabapentin 300 milliGRAM(s) Oral every 8 hours  glucagon  Injectable 1 milliGRAM(s) IntraMuscular once  insulin glargine Injectable (LANTUS) 30 Unit(s) SubCutaneous at bedtime  insulin lispro (ADMELOG) corrective regimen sliding scale   SubCutaneous three times a day before meals  insulin lispro (ADMELOG) corrective regimen sliding scale   SubCutaneous at bedtime  insulin lispro Injectable (ADMELOG) 5 Unit(s) SubCutaneous three times a day before meals  levothyroxine 150 MICROGram(s) Oral daily  metoprolol succinate ER 25 milliGRAM(s) Oral daily  predniSONE   Tablet 40 milliGRAM(s) Oral daily  sacubitril 49 mG/valsartan 51 mG 1 Tablet(s) Oral two times a day      Vitals:  T(C): 36.7 (24 @ 08:07), Max: 36.8 (24 @ 06:20)  HR: 66 (24 @ 08:07) (64 - 91)  BP: 143/70 (24 @ 08:07) (132/71 - 161/73)  BP(mean): 102 (24 @ 16:41) (102 - 102)  RR: 18 (24 @ 08:07) (16 - 18)  SpO2: 94% (24 @ 08:07) (91% - 94%)    Daily     Daily Weight in k.3 (2024 06:10)        I&O's Summary    2024 07:01  -  2024 07:00  --------------------------------------------------------  IN: 450 mL / OUT: 1300 mL / NET: -850 mL    2024 07:01  -  2024 13:24  --------------------------------------------------------  IN: 480 mL / OUT: 800 mL / NET: -320 mL        Physical Exam:  Appearance: No Acute Distress  HEENT: PERRL  Neck: No JVD  Cardiovascular: Normal S1 S2, No murmurs/rubs/gallops  Respiratory: Clear to auscultation bilaterally  Gastrointestinal: Soft, Non-tender	  Skin: No cyanosis	  Neurologic: Non-focal  Extremities: No LE edema  Psychiatry: A & O x 3, Mood & affect appropriate    Labs:                        12.7   7.85  )-----------( 163      ( 2024 09:09 )             38.2         135  |  98  |  52.7<H>  ----------------------------<  259<H>  5.2   |  28.0  |  1.38<H>    Ca    9.2      2024 09:09  Mg     2.4     04-    TPro  8.0  /  Alb  4.1  /  TBili  0.5  /  DBili  x   /  AST  24  /  ALT  25  /  AlkPhos  84

## 2024-04-23 NOTE — PROGRESS NOTE ADULT - SUBJECTIVE AND OBJECTIVE BOX
Patient is a 63y old  Male who presents with a chief complaint of acute chf (23 Apr 2024 07:19)      pt is c/o exertional sob.denies dizziness,chest pain  REVIEW OF SYSTEMS: All systems are reviewed and found to be negative except above    MEDICATIONS  (STANDING):  albuterol    0.083% 2.5 milliGRAM(s) Nebulizer every 6 hours  albuterol    90 MICROgram(s) HFA Inhaler 1 Puff(s) Inhalation every 4 hours  atorvastatin 40 milliGRAM(s) Oral at bedtime  dextrose 10% Bolus 125 milliLiter(s) IV Bolus once  dextrose 5%. 1000 milliLiter(s) (50 mL/Hr) IV Continuous <Continuous>  dextrose 5%. 1000 milliLiter(s) (100 mL/Hr) IV Continuous <Continuous>  dextrose 50% Injectable 25 Gram(s) IV Push once  dextrose 50% Injectable 12.5 Gram(s) IV Push once  furosemide   Injectable 40 milliGRAM(s) IV Push two times a day  gabapentin 300 milliGRAM(s) Oral every 8 hours  glucagon  Injectable 1 milliGRAM(s) IntraMuscular once  insulin glargine Injectable (LANTUS) 30 Unit(s) SubCutaneous at bedtime  insulin lispro (ADMELOG) corrective regimen sliding scale   SubCutaneous three times a day before meals  insulin lispro (ADMELOG) corrective regimen sliding scale   SubCutaneous at bedtime  insulin lispro Injectable (ADMELOG) 5 Unit(s) SubCutaneous three times a day before meals  levothyroxine 150 MICROGram(s) Oral daily  metoprolol succinate ER 25 milliGRAM(s) Oral daily  predniSONE   Tablet 40 milliGRAM(s) Oral daily  sacubitril 49 mG/valsartan 51 mG 1 Tablet(s) Oral two times a day    MEDICATIONS  (PRN):  albuterol    0.083% 2.5 milliGRAM(s) Nebulizer every 4 hours PRN Shortness of Breath and/or Wheezing  dextrose Oral Gel 15 Gram(s) Oral once PRN Blood Glucose LESS THAN 70 milliGRAM(s)/deciliter      CAPILLARY BLOOD GLUCOSE      POCT Blood Glucose.: 252 mg/dL (23 Apr 2024 09:25)  POCT Blood Glucose.: 242 mg/dL (23 Apr 2024 07:56)  POCT Blood Glucose.: 394 mg/dL (22 Apr 2024 21:09)  POCT Blood Glucose.: 232 mg/dL (22 Apr 2024 16:48)    I&O's Summary    22 Apr 2024 07:01  -  23 Apr 2024 07:00  --------------------------------------------------------  IN: 450 mL / OUT: 1300 mL / NET: -850 mL        PHYSICAL EXAM:  Vital Signs Last 24 Hrs  T(C): 36.7 (23 Apr 2024 08:07), Max: 36.8 (23 Apr 2024 06:20)  T(F): 98.1 (23 Apr 2024 08:07), Max: 98.2 (23 Apr 2024 06:20)  HR: 66 (23 Apr 2024 08:07) (64 - 91)  BP: 143/70 (23 Apr 2024 08:07) (132/71 - 161/73)  BP(mean): 102 (22 Apr 2024 16:41) (102 - 102)  RR: 18 (23 Apr 2024 08:07) (16 - 18)  SpO2: 94% (23 Apr 2024 08:07) (91% - 94%)    Parameters below as of 23 Apr 2024 08:07  Patient On (Oxygen Delivery Method): room air        CONSTITUTIONAL: NAD,  EYES: PERRLA; conjunctiva and sclera clear  ENMT: Moist oral mucosa,   RESPIRATORY: Normal respiratory effort; mild cracakles to auscultation bilaterally  CARDIOVASCULAR:  normal S1 and S2, no murmur   EXTS: trace lower extremity edema; Peripheral pulses are 2+ bilaterally  ABDOMEN: Nontender to palpation, normoactive bowel sounds, no rebound/guarding;   MUSCLOSKELETAL: no joint swelling or tenderness to palpation  PSYCH: affect appropriate  NEUROLOGY: A+O to person, place, and time; CN 2-12 are intact and symmetric; no gross sensory deficits;       LABS:                        12.7   7.85  )-----------( 163      ( 23 Apr 2024 09:09 )             38.2     04-23    135  |  98  |  52.7<H>  ----------------------------<  259<H>  5.2   |  28.0  |  1.38<H>    Ca    9.2      23 Apr 2024 09:09  Mg     2.4     04-23    TPro  8.0  /  Alb  4.1  /  TBili  0.5  /  DBili  x   /  AST  24  /  ALT  25  /  AlkPhos  84  04-23          Urinalysis Basic - ( 23 Apr 2024 09:09 )    Color: x / Appearance: x / SG: x / pH: x  Gluc: 259 mg/dL / Ketone: x  / Bili: x / Urobili: x   Blood: x / Protein: x / Nitrite: x   Leuk Esterase: x / RBC: x / WBC x   Sq Epi: x / Non Sq Epi: x / Bacteria: x          RADIOLOGY & ADDITIONAL TESTS:  Results Reviewed:   < from: TTE W or WO Ultrasound Enhancing Agent (04.19.24 @ 19:00) >    CONCLUSIONS:      1. Septal motion is abnormal consistent with previous cardiac surgery. Left ventricular systolic function is moderately decreased with an ejection fraction visually estimated at 35 to 40 %.   2. Mild left ventricular hypertrophy.   3. The left ventricular diastolic function is indeterminate, with indeterminant filling pressure.   4. Moderately enlarged right ventricular cavity size and moderately reduced systolic function.   5. Estimated pulmonary artery systolic pressure is 36 mmHg.   6. Moderate tricuspid regurgitation.   7. Mitral annuloplasty ring present with mild residual regurgitation.   8. Mild pulmonic regurgitation.   9. The left atrium is normal in size.  10. The right atrium is mildly dilated.  11. No pericardial effusion seen.  12. Compared to the transthoracic echocardiogram performed on 3/2023, prior LV EF reportedly of 40-45%.    < end of copied text >  < from: TTE W or WO Ultrasound Enhancing Agent (04.19.24 @ 19:00) >    CONCLUSIONS:      1. Septal motion is abnormal consistent with previous cardiac surgery. Left ventricular systolic function is moderately decreased with an ejection fraction visually estimated at 35 to 40 %.   2. Mild left ventricular hypertrophy.   3. The left ventricular diastolic function is indeterminate, with indeterminant filling pressure.   4. Moderately enlarged right ventricular cavity size and moderately reduced systolic function.   5. Estimated pulmonary artery systolic pressure is 36 mmHg.   6. Moderate tricuspid regurgitation.   7. Mitral annuloplasty ring present with mild residual regurgitation.   8. Mild pulmonic regurgitation.   9. The left atrium is normal in size.  10. The right atrium is mildly dilated.  11. No pericardial effusion seen.  12. Compared to the transthoracic echocardiogram performed on 3/2023, prior LV EF reportedly of 40-45%.    < end of copied text >  :

## 2024-04-24 ENCOUNTER — TRANSCRIPTION ENCOUNTER (OUTPATIENT)
Age: 64
End: 2024-04-24

## 2024-04-24 DIAGNOSIS — I25.10 ATHEROSCLEROTIC HEART DISEASE OF NATIVE CORONARY ARTERY WITHOUT ANGINA PECTORIS: ICD-10-CM

## 2024-04-24 LAB
ANION GAP SERPL CALC-SCNC: 10 MMOL/L — SIGNIFICANT CHANGE UP (ref 5–17)
BUN SERPL-MCNC: 53.9 MG/DL — HIGH (ref 8–20)
CALCIUM SERPL-MCNC: 9.7 MG/DL — SIGNIFICANT CHANGE UP (ref 8.4–10.5)
CHLORIDE SERPL-SCNC: 98 MMOL/L — SIGNIFICANT CHANGE UP (ref 96–108)
CO2 SERPL-SCNC: 30 MMOL/L — HIGH (ref 22–29)
CREAT SERPL-MCNC: 1.53 MG/DL — HIGH (ref 0.5–1.3)
EGFR: 51 ML/MIN/1.73M2 — LOW
GLUCOSE BLDC GLUCOMTR-MCNC: 201 MG/DL — HIGH (ref 70–99)
GLUCOSE BLDC GLUCOMTR-MCNC: 211 MG/DL — HIGH (ref 70–99)
GLUCOSE BLDC GLUCOMTR-MCNC: 213 MG/DL — HIGH (ref 70–99)
GLUCOSE BLDC GLUCOMTR-MCNC: 346 MG/DL — HIGH (ref 70–99)
GLUCOSE SERPL-MCNC: 188 MG/DL — HIGH (ref 70–99)
HCT VFR BLD CALC: 40.3 % — SIGNIFICANT CHANGE UP (ref 39–50)
HGB BLD-MCNC: 12.8 G/DL — LOW (ref 13–17)
MCHC RBC-ENTMCNC: 28.6 PG — SIGNIFICANT CHANGE UP (ref 27–34)
MCHC RBC-ENTMCNC: 31.8 GM/DL — LOW (ref 32–36)
MCV RBC AUTO: 90 FL — SIGNIFICANT CHANGE UP (ref 80–100)
PLATELET # BLD AUTO: 189 K/UL — SIGNIFICANT CHANGE UP (ref 150–400)
POTASSIUM SERPL-MCNC: 5.2 MMOL/L — SIGNIFICANT CHANGE UP (ref 3.5–5.3)
POTASSIUM SERPL-SCNC: 5.2 MMOL/L — SIGNIFICANT CHANGE UP (ref 3.5–5.3)
RBC # BLD: 4.48 M/UL — SIGNIFICANT CHANGE UP (ref 4.2–5.8)
RBC # FLD: 14 % — SIGNIFICANT CHANGE UP (ref 10.3–14.5)
SODIUM SERPL-SCNC: 138 MMOL/L — SIGNIFICANT CHANGE UP (ref 135–145)
WBC # BLD: 9.47 K/UL — SIGNIFICANT CHANGE UP (ref 3.8–10.5)
WBC # FLD AUTO: 9.47 K/UL — SIGNIFICANT CHANGE UP (ref 3.8–10.5)

## 2024-04-24 PROCEDURE — 92928 PRQ TCAT PLMT NTRAC ST 1 LES: CPT | Mod: LD

## 2024-04-24 PROCEDURE — 99232 SBSQ HOSP IP/OBS MODERATE 35: CPT

## 2024-04-24 PROCEDURE — 93010 ELECTROCARDIOGRAM REPORT: CPT

## 2024-04-24 PROCEDURE — 92978 ENDOLUMINL IVUS OCT C 1ST: CPT | Mod: 26,LD

## 2024-04-24 PROCEDURE — 99152 MOD SED SAME PHYS/QHP 5/>YRS: CPT

## 2024-04-24 PROCEDURE — 93459 L HRT ART/GRFT ANGIO: CPT | Mod: 26,59

## 2024-04-24 RX ORDER — ASPIRIN/CALCIUM CARB/MAGNESIUM 324 MG
81 TABLET ORAL ONCE
Refills: 0 | Status: COMPLETED | OUTPATIENT
Start: 2024-04-24 | End: 2024-04-24

## 2024-04-24 RX ORDER — BUMETANIDE 0.25 MG/ML
2 INJECTION INTRAMUSCULAR; INTRAVENOUS EVERY 12 HOURS
Refills: 0 | Status: DISCONTINUED | OUTPATIENT
Start: 2024-04-25 | End: 2024-04-25

## 2024-04-24 RX ORDER — FUROSEMIDE 40 MG
40 TABLET ORAL ONCE
Refills: 0 | Status: COMPLETED | OUTPATIENT
Start: 2024-04-24 | End: 2024-04-24

## 2024-04-24 RX ORDER — ASPIRIN/CALCIUM CARB/MAGNESIUM 324 MG
81 TABLET ORAL DAILY
Refills: 0 | Status: DISCONTINUED | OUTPATIENT
Start: 2024-04-25 | End: 2024-04-25

## 2024-04-24 RX ORDER — CLOPIDOGREL BISULFATE 75 MG/1
75 TABLET, FILM COATED ORAL DAILY
Refills: 0 | Status: DISCONTINUED | OUTPATIENT
Start: 2024-04-25 | End: 2024-04-25

## 2024-04-24 RX ORDER — APIXABAN 2.5 MG/1
5 TABLET, FILM COATED ORAL
Refills: 0 | Status: DISCONTINUED | OUTPATIENT
Start: 2024-04-24 | End: 2024-04-25

## 2024-04-24 RX ADMIN — ATORVASTATIN CALCIUM 40 MILLIGRAM(S): 80 TABLET, FILM COATED ORAL at 21:05

## 2024-04-24 RX ADMIN — Medication 25 MILLIGRAM(S): at 06:21

## 2024-04-24 RX ADMIN — INSULIN GLARGINE 30 UNIT(S): 100 INJECTION, SOLUTION SUBCUTANEOUS at 21:42

## 2024-04-24 RX ADMIN — Medication 4: at 21:46

## 2024-04-24 RX ADMIN — Medication 5 UNIT(S): at 17:04

## 2024-04-24 RX ADMIN — Medication 4: at 08:45

## 2024-04-24 RX ADMIN — Medication 81 MILLIGRAM(S): at 17:05

## 2024-04-24 RX ADMIN — Medication 40 MILLIGRAM(S): at 06:21

## 2024-04-24 RX ADMIN — GABAPENTIN 300 MILLIGRAM(S): 400 CAPSULE ORAL at 21:05

## 2024-04-24 RX ADMIN — Medication 40 MILLIGRAM(S): at 17:05

## 2024-04-24 RX ADMIN — GABAPENTIN 300 MILLIGRAM(S): 400 CAPSULE ORAL at 06:20

## 2024-04-24 RX ADMIN — Medication 150 MICROGRAM(S): at 06:21

## 2024-04-24 RX ADMIN — SACUBITRIL AND VALSARTAN 1 TABLET(S): 24; 26 TABLET, FILM COATED ORAL at 06:20

## 2024-04-24 RX ADMIN — Medication 40 MILLIGRAM(S): at 06:20

## 2024-04-24 RX ADMIN — Medication 4: at 17:04

## 2024-04-24 RX ADMIN — APIXABAN 5 MILLIGRAM(S): 2.5 TABLET, FILM COATED ORAL at 21:42

## 2024-04-24 RX ADMIN — SACUBITRIL AND VALSARTAN 1 TABLET(S): 24; 26 TABLET, FILM COATED ORAL at 17:05

## 2024-04-24 NOTE — DISCHARGE NOTE PROVIDER - NSDCCPCAREPLAN_GEN_ALL_CORE_FT
PRINCIPAL DISCHARGE DIAGNOSIS  Diagnosis: Acute exacerbation of CHF (congestive heart failure)  Assessment and Plan of Treatment:      PRINCIPAL DISCHARGE DIAGNOSIS  Diagnosis: Acute on chronic systolic congestive heart failure  Assessment and Plan of Treatment:       SECONDARY DISCHARGE DIAGNOSES  Diagnosis: CAD (coronary artery disease)  Assessment and Plan of Treatment:     Diagnosis: DM (diabetes mellitus)  Assessment and Plan of Treatment:     Diagnosis: Acute kidney injury superimposed on CKD  Assessment and Plan of Treatment:     Diagnosis: Chronic atrial fibrillation  Assessment and Plan of Treatment:

## 2024-04-24 NOTE — DISCHARGE NOTE PROVIDER - ATTENDING DISCHARGE PHYSICAL EXAMINATION:
No acute issues    T(C): 36.4 (04-25-24 @ 07:44), Max: 36.8 (04-24-24 @ 17:40)  HR: 80 (04-25-24 @ 07:44) (64 - 99)  BP: 149/77 (04-25-24 @ 07:44) (99/53 - 157/70)  RR: 17 (04-25-24 @ 07:44) (10 - 18)  SpO2: 97% (04-25-24 @ 07:44) (95% - 98%)RA    GEN - NAD  HEENT - NCAT, EOMI, NADIYA,   RESP - CTA BL, no wheeze/stridor/rhonchi/crackles.  CARDIO - NS1S2, . No murmurs  ABD - Soft/Non tender/Non distended. Normal BS x4 quadrants.   Ext - trace PALMA.   MSK - full ROM of BL upper and lower extremities without pain or restriction. BL 5/5 strength on upper and lower extremities.   Neuro - cn 2-12 grossly intact. cerebellar function intact. no visible seizure activity appreciated. no tremor. gait not observed.   Psych- AAOx3.  attentive. normal affect.

## 2024-04-24 NOTE — DISCHARGE NOTE PROVIDER - NSDCFUSCHEDAPPT_GEN_ALL_CORE_FT
Brandi Negrete  [facility] Physician [facility]  HEARTVASC [address]  Scheduled Appointment: 05/02/2024    Nikky Kirkland  [facility] Physician [facility]  HEARTFAIL [address]  Scheduled Appointment: 05/03/2024    Marissa Jerome  [facility]  NEUROLOGY [address]  Scheduled Appointment: 05/08/2024    [facility]  HEARTVASC [address]  Scheduled Appointment: 05/17/2024     Nikky Kirkland  [facility] Physician [facility]  HEARTFAIL [address]  Scheduled Appointment: 04/26/2024    Brandi Negrete  [facility]  HEARTVASC [address]  Scheduled Appointment: 05/02/2024    Marisas Jerome  [facility]  NEUROLOGY [address]  Scheduled Appointment: 05/08/2024    [facility]  HEARTVASC [address]  Scheduled Appointment: 05/17/2024     Brandi Negrete  [facility] Physician [facility]  HEARTVASC [address]  Scheduled Appointment: 05/02/2024    Marissa Jerome  [facility] Physician [facility]  NEUROLOGY [address]  Scheduled Appointment: 05/08/2024    [facility]  HEARTVASC [address]  Scheduled Appointment: 05/17/2024

## 2024-04-24 NOTE — PROGRESS NOTE ADULT - SUBJECTIVE AND OBJECTIVE BOX
Patient is a 63y old  Male who presents with a chief complaint of acute chf (24 Apr 2024 07:31)      c/o sob on exertio,legs swelling. denies cp,dizziness  REVIEW OF SYSTEMS: All systems are reviewed and found to be negative except above    MEDICATIONS  (STANDING):  albuterol    0.083% 2.5 milliGRAM(s) Nebulizer every 6 hours  albuterol    90 MICROgram(s) HFA Inhaler 1 Puff(s) Inhalation every 4 hours  atorvastatin 40 milliGRAM(s) Oral at bedtime  dextrose 10% Bolus 125 milliLiter(s) IV Bolus once  dextrose 5%. 1000 milliLiter(s) (100 mL/Hr) IV Continuous <Continuous>  dextrose 5%. 1000 milliLiter(s) (50 mL/Hr) IV Continuous <Continuous>  dextrose 50% Injectable 25 Gram(s) IV Push once  dextrose 50% Injectable 12.5 Gram(s) IV Push once  furosemide   Injectable 40 milliGRAM(s) IV Push two times a day  gabapentin 300 milliGRAM(s) Oral every 8 hours  glucagon  Injectable 1 milliGRAM(s) IntraMuscular once  insulin glargine Injectable (LANTUS) 30 Unit(s) SubCutaneous at bedtime  insulin lispro (ADMELOG) corrective regimen sliding scale   SubCutaneous at bedtime  insulin lispro (ADMELOG) corrective regimen sliding scale   SubCutaneous three times a day before meals  insulin lispro Injectable (ADMELOG) 5 Unit(s) SubCutaneous three times a day before meals  levothyroxine 150 MICROGram(s) Oral daily  metoprolol succinate ER 25 milliGRAM(s) Oral daily  predniSONE   Tablet 40 milliGRAM(s) Oral daily  sacubitril 49 mG/valsartan 51 mG 1 Tablet(s) Oral two times a day    MEDICATIONS  (PRN):  albuterol    0.083% 2.5 milliGRAM(s) Nebulizer every 4 hours PRN Shortness of Breath and/or Wheezing  dextrose Oral Gel 15 Gram(s) Oral once PRN Blood Glucose LESS THAN 70 milliGRAM(s)/deciliter      CAPILLARY BLOOD GLUCOSE      POCT Blood Glucose.: 211 mg/dL (24 Apr 2024 12:33)  POCT Blood Glucose.: 201 mg/dL (24 Apr 2024 07:56)  POCT Blood Glucose.: 273 mg/dL (23 Apr 2024 22:16)  POCT Blood Glucose.: 273 mg/dL (23 Apr 2024 21:13)  POCT Blood Glucose.: 370 mg/dL (23 Apr 2024 17:10)    I&O's Summary    23 Apr 2024 07:01  -  24 Apr 2024 07:00  --------------------------------------------------------  IN: 960 mL / OUT: 2300 mL / NET: -1340 mL        PHYSICAL EXAM:  Vital Signs Last 24 Hrs  T(C): 36.3 (24 Apr 2024 11:33), Max: 36.6 (23 Apr 2024 16:10)  T(F): 97.4 (24 Apr 2024 11:33), Max: 97.9 (23 Apr 2024 16:10)  HR: 69 (24 Apr 2024 11:33) (69 - 83)  BP: 153/78 (24 Apr 2024 11:33) (129/69 - 153/78)  BP(mean): 93 (23 Apr 2024 16:10) (93 - 93)  RR: 17 (24 Apr 2024 11:33) (17 - 18)  SpO2: 97% (24 Apr 2024 11:33) (94% - 98%)    Parameters below as of 24 Apr 2024 11:00  Patient On (Oxygen Delivery Method): room air        CONSTITUTIONAL: NAD,  EYES: PERRLA; conjunctiva and sclera clear  ENMT: Moist oral mucosa,   RESPIRATORY: Normal respiratory effort; crackles  to auscultation bilaterally  CARDIOVASCULAR: normal S1 and S2, no murmur   EXTS: + lower extremity edema; Peripheral pulses are 2+ bilaterally  ABDOMEN: Nontender to palpation, normoactive bowel sounds, no rebound/guarding;   MUSCLOSKELETAL:  no joint swelling or tenderness to palpation  PSYCH: affect appropriate  NEUROLOGY: A+O to person, place, and time; CN 2-12 are intact and symmetric; no gross sensory deficits;       LABS:                        12.8   9.47  )-----------( 189      ( 24 Apr 2024 08:30 )             40.3     04-24    138  |  98  |  53.9<H>  ----------------------------<  188<H>  5.2   |  30.0<H>  |  1.53<H>    Ca    9.7      24 Apr 2024 08:30  Mg     2.4     04-23    TPro  8.0  /  Alb  4.1  /  TBili  0.5  /  DBili  x   /  AST  24  /  ALT  25  /  AlkPhos  84  04-23          Urinalysis Basic - ( 24 Apr 2024 08:30 )    Color: x / Appearance: x / SG: x / pH: x  Gluc: 188 mg/dL / Ketone: x  / Bili: x / Urobili: x   Blood: x / Protein: x / Nitrite: x   Leuk Esterase: x / RBC: x / WBC x   Sq Epi: x / Non Sq Epi: x / Bacteria: x          RADIOLOGY & ADDITIONAL TESTS:  Results Reviewed:   < from: US Abdomen Limited (04.22.24 @ 13:03) >  FINDINGS/  IMPRESSION:  1.  No intra-abdominal ascites observed on ultrasound.  2.  Etiology of abdominal distention should be determined by another means    < end of copied text >

## 2024-04-24 NOTE — PROGRESS NOTE ADULT - ASSESSMENT
64 y/o M with PMH of CAD s/p 1v CABG with MV repair, KOFI clip and MAZE (2003), s/p DARYA to ramus (4/2021), HFmrEF (LVEF 40-45%), AFib (on Eliquis), chronotropic incompetence, s/p dual chamber ICD (given suspected sarcoidosis with LV dysfunction, Dr. Aguirre at [place] 8/2021), VF s/p ICD shock, IDDM2 (A1C 8.5% 4/21/24), Grave's disease s/p radioactive iodine with subsequent hypothyroidism, untreated MAEGAN, COPD, PAD s/p leg stent and s/p right carotid endarterectomy, who presented to the ED with complaints of leg swelling, shortness of breath, and orthopnea.   Patient states that for the last month he has been experiencing worsening dyspnea on exertion and leg swelling. Patient was seen by Dr. Kirkland 04/03/24 and referred to [facility] for worsening symptoms, he was diagnosed with PNA there, and discharged home the following day. Patient states that ever since then his symptoms have gotten worse despite taking his lasix 40mg daily. Patient called Dr. Kirkland's office and increased his lasix to 60mg for the last 2 days, but when he had some chest pressure and dyspnea at rest he came to the ER to be evaluated.   -LVEF now 35-40% from 40-45%  4/19/24 TTE: LVEF 35-40%, LVIDd 5.1cm, mild LVH, mod RVE with mod RV dysfunction, mild MR, mod TR, mild ID, PASP 36 mmHg.  Patient currently with c/o OBRIEN with mild activity, denies HA, dizziness, CP, pnd  Acute kidney injury superimposed on CKD., Cr: 1.53, fluctuating around baseline  -Plan for R/LHC with CardioMEMS implant to prevent recurrent HF hospitalizations.    Patient now s/p Right and left heart catheterization via RFV and RFA approach (RFA sealed with angioseal, S/P manual pull of RFV sheath)  with Dr. Frey, tolerated procedure well without complications.    Intraprocedurally: Patient received IV sedation IV midazolam and fentanyl, and IV heparin 10,000 units     Omniupaque: 110 ml     post procedure, patient received plavix 600mg po x1 loading dose and aspirin 81mg po x 4 tablets    Findings:   Preliminary cath  report as below:  S/P R/LHC  80% pLAD  DESX1 TO pLAD    LVEDP: 40    Right heart cath not performed    Official cath report pending     Post Cath EKG: Accelerated junctional rhyuthm, No acute ST/T changes     # S/P LHC/80% PLAD Lesion/ DESX1 TO pLAD     -ADMIT due to: / Pt is already in-patient sec to HF/anginal symtoms  -post cardiac cath management per protocol  -Right groin  precautions  -bedrest x 3 hours post procedure, patient can sit up after 2 hours, oOB after 3 hours if tolerated  -labs and EKG in am  -NS 0.9% 250ml/hr x 1 bolus: post procedure SILVA ppx held as he has high LVEDP  -continue current medical therapy INCLUDING  -Triple therapy  with aspirin/ plavix/Eliquis  , reinforced importance of strict adherence to triple therapy for a month, and then DC aspirin after a month, C/W ASA/Plavix  -Resume  eLIQUIS TONIGHT AT 10 PM IF rt groin benign  -statin therapy: C/W Atorvastatin, titration of statin per cardiology  -beta blocker: C/W Toprol  -C/W rest of the current medication regimen   -pLAN FOR Cardiomems aborted as patient with pLAD lesion as culprit lesion and PCI to pLAD  -follow up outpt in 1 week with DR Frey, and in 2 weeks with Cardiologist DR Sher (HF TEAM), DR Ngerete ([facility] cardiologist)   - [facility], and HF team  following during hospitalization  -Lifestyle modifications discussed to reduce cardiovascular risk factors including weight reduction, smoking cessation, medication compliance, and routine follow up with Cardiologist to track your BMI, cholesterol, and glucose levels.   - cardiac rehab info provided/referral and communication to cardiac rehab completed  -Further Management per Hospitalist / Cardiology/HF   --transfer patient to tele unit once criteria met  -Discussed with DR Frey    64 y/o M with PMH of CAD s/p 1v CABG with MV repair, KOFI clip and MAZE (2003), s/p DARYA to ramus (4/2021), HFmrEF (LVEF 40-45%), AFib (on Eliquis), chronotropic incompetence, s/p dual chamber ICD (given suspected sarcoidosis with LV dysfunction, Dr. Aguirre at [place] 8/2021), VF s/p ICD shock, IDDM2 (A1C 8.5% 4/21/24), Grave's disease s/p radioactive iodine with subsequent hypothyroidism, untreated MAEGAN, COPD, PAD s/p leg stent and s/p right carotid endarterectomy, who presented to the ED with complaints of leg swelling, shortness of breath, and orthopnea.   Patient states that for the last month he has been experiencing worsening dyspnea on exertion and leg swelling. Patient was seen by Dr. Kirkland 04/03/24 and referred to [facility] for worsening symptoms, he was diagnosed with PNA there, and discharged home the following day. Patient states that ever since then his symptoms have gotten worse despite taking his lasix 40mg daily. Patient called Dr. Kirkland's office and increased his lasix to 60mg for the last 2 days, but when he had some chest pressure and dyspnea at rest he came to the ER to be evaluated.   -LVEF now 35-40% from 40-45%  4/19/24 TTE: LVEF 35-40%, LVIDd 5.1cm, mild LVH, mod RVE with mod RV dysfunction, mild MR, mod TR, mild [place], PASP 36 mmHg.  Patient currently with c/o OBRIEN with mild activity, denies HA, dizziness, CP, pnd  Acute kidney injury superimposed on CKD., Cr: 1.53, fluctuating around baseline  -Plan for R/LHC with CardioMEMS implant to prevent recurrent HF hospitalizations.    Patient now s/p Right and left heart catheterization via RFV and RFA approach (RFA sealed with angioseal, S/P manual pull of RFV sheath)  with Dr. Frey, tolerated procedure well without complications.    Intraprocedurally: Patient received IV sedation IV midazolam and fentanyl, and IV heparin 10,000 units     Omniupaque: 110 ml     post procedure, patient received plavix 600mg po x1 loading dose and aspirin 81mg po x 4 tablets    Findings:   Preliminary cath  report as below:  S/P R/LHC  80% pLAD  DESX1 TO pLAD    LVEDP: 40    Right heart cath not performed    Official cath report pending     Post Cath EKG: Accelerated junctional rhyuthm, No acute ST/T changes     # S/P LHC/80% PLAD Lesion/ DESX1 TO pLAD     -ADMIT due to: / Pt is already in-patient sec to HF/anginal symtoms  -post cardiac cath management per protocol  -Right groin  precautions  -bedrest x 3 hours post procedure, patient can sit up after 2 hours, oOB after 3 hours if tolerated  -labs and EKG in am  -NS 0.9% 250ml/hr x 1 bolus: post procedure SILVA ppx held as he has high LVEDP  -continue current medical therapy INCLUDING  -Triple therapy  with aspirin/ plavix/Eliquis  , reinforced importance of strict adherence to triple therapy for a month, and then DC aspirin after a month, C/W ASA/Plavix  -Resume  eLIQUIS TONIGHT AT 10 PM IF rt groin benign  -statin therapy: C/W Atorvastatin, titration of statin per cardiology  -beta blocker: C/W Toprol  -C/W rest of the current medication regimen   -pLAN FOR Cardiomems aborted as patient with pLAD lesion as culprit lesion and PCI to pLAD  -PATIENT NEED RENAL CONSULT IN SETTING OF LUPE ON CKD, S/P IV CONTRAST NOW, UNABLE TO HYDRATE DUE TO HF, ELEVATED LVEDP  -follow up outpt in 1 week with DR Frey, and in 2 weeks with Cardiologist DR Sher (HF TEAM), DR Negrete ([facility] cardiologist)   - [facility] Cardiology, and HF team  following during hospitalization  -Lifestyle modifications discussed to reduce cardiovascular risk factors including weight reduction, smoking cessation, medication compliance, and routine follow up with Cardiologist to track your BMI, cholesterol, and glucose levels.   - cardiac rehab info provided/referral and communication to cardiac rehab completed  -Further Management per Hospitalist / Cardiology/HF   --transfer patient to tele unit once criteria met  -Discussed with DR Frey    62 y/o M with PMH of CAD s/p 1v CABG with MV repair, KOFI clip and MAZE (2003), s/p DARYA to ramus (4/2021), HFmrEF (LVEF 40-45%), AFib (on Eliquis), chronotropic incompetence, s/p dual chamber ICD (given suspected sarcoidosis with LV dysfunction, Dr. Aguirre at [place] 8/2021), VF s/p ICD shock, IDDM2 (A1C 8.5% 4/21/24), Grave's disease s/p radioactive iodine with subsequent hypothyroidism, untreated MAEGAN, COPD, PAD s/p leg stent and s/p right carotid endarterectomy, who presented to the ED with complaints of leg swelling, shortness of breath, and orthopnea.   Patient states that for the last month he has been experiencing worsening dyspnea on exertion and leg swelling. Patient was seen by Dr. Kirkland 04/03/24 and referred to [facility] for worsening symptoms, he was diagnosed with PNA there, and discharged home the following day. Patient states that ever since then his symptoms have gotten worse despite taking his lasix 40mg daily. Patient called Dr. Kirkland's office and increased his lasix to 60mg for the last 2 days, but when he had some chest pressure and dyspnea at rest he came to the ER to be evaluated.   -LVEF now 35-40% from 40-45%  4/19/24 TTE: LVEF 35-40%, LVIDd 5.1cm, mild LVH, mod RVE with mod RV dysfunction, mild MR, mod TR, mild [place], PASP 36 mmHg.  Patient currently with c/o OBRIEN with mild activity, denies HA, dizziness, CP, pnd  Acute kidney injury superimposed on CKD., Cr: 1.53, fluctuating around baseline  -Plan for R/LHC with CardioMEMS implant to prevent recurrent HF hospitalizations.    Patient now s/p Right and left heart catheterization via RFV and RFA approach (RFA sealed with angioseal, S/P manual pull of RFV sheath)  with Dr. Frey, tolerated procedure well without complications.    Intraprocedurally: Patient received IV sedation IV midazolam and fentanyl, and IV heparin 10,000 units     Omniupaque: 110 ml     post procedure, patient received plavix 600mg po x1 loading dose and aspirin 81mg po x 4 tablets    Findings:   Preliminary cath  report as below:  S/P R/LHC  80% pLAD  DESX1 TO pLAD    LVEDP: 40    Right heart cath not performed    Official cath report pending     Post Cath EKG Afib, non specific ST/T changes,  No acute ST/T changes     # S/P LHC/80% PLAD Lesion/ DESX1 TO pLAD     -ADMIT due to: / Pt is already in-patient sec to HF/anginal symtoms  -post cardiac cath management per protocol  -Right groin  precautions  -bedrest x 3 hours post procedure, patient can sit up after 2 hours, oOB after 3 hours if tolerated  -labs and EKG in am  -NS 0.9% 250ml/hr x 1 bolus: post procedure SILVA ppx held as he has high LVEDP  -continue current medical therapy INCLUDING  -Triple therapy  with aspirin/ plavix/Eliquis  , reinforced importance of strict adherence to triple therapy for a month, and then DC aspirin after a month, C/W ASA/Plavix  -Resume  eLIQUIS TONIGHT AT 10 PM IF rt groin benign  -statin therapy: C/W Atorvastatin, titration of statin per cardiology  -beta blocker: C/W Toprol  -C/W rest of the current medication regimen   -pLAN FOR Cardiomems aborted as patient with pLAD lesion as culprit lesion and PCI to pLAD  -PATIENT NEED RENAL CONSULT IN SETTING OF LUPE ON CKD, S/P IV CONTRAST NOW, UNABLE TO HYDRATE DUE TO HF, ELEVATED LVEDP  -follow up outpt in 1 week with DR Frey, and in 2 weeks with Cardiologist DR Sher (HF TEAM), DR Nergete ([facility] cardiologist)   - [facility] Cardiology, and HF team  following during hospitalization  -Lifestyle modifications discussed to reduce cardiovascular risk factors including weight reduction, smoking cessation, medication compliance, and routine follow up with Cardiologist to track your BMI, cholesterol, and glucose levels.   - cardiac rehab info provided/referral and communication to cardiac rehab completed  -Further Management per Hospitalist / Cardiology/HF   --transfer patient to tele unit once criteria met  -Discussed with DR Frey

## 2024-04-24 NOTE — PROGRESS NOTE ADULT - ASSESSMENT
Patient is a 62 y/o Male who was under ER observation but now admitted to hospitalist team , pt. with acute chf with reduced EF. pt. 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 PAD s/p LE stent, right carotid endartectomy, small bowel AVM (no longer on plavix) who presented to the ED with complaints of leg swelling, shortness of breath, and orthopnea    Acute on chronic systolic heart failure   -Telemetry  -   - cont IV lasix 40 mg bid, increased Entresto doses  - cont metoprolol ER 25  -LEON EF 35-40%,MOR TR  - monitor lytes and I+O, daily weights  - HF team is following, plan for Cath /cardiomems today    -Social History: Is  from his wife, denies smoking, social alcohol use. Smokes marijuana for sleep.  -Abd US to r/o ascite    COPD exacerbation  -  - given 1 dose iv steroids and cont with oral steroids  - nebs      History of CABG, PCI stent  History of MV repair  History of KOFI clip  Chronic Atrial Fibrillation   PVD stent  - hold eliquis for cath  - lipitor, metoprolol  - scd's    Hypothyroidism (s/p grave's disease)  - cont levothyroxine    MAEGAN:   -Pt states he has a mask but does not use it.   -Discussed the  importance of sleep apnea treatment.   -F/U  Pulmonology out pt.    DVT PPx  -scds    Dispo - active    Plan of care dw pt Patient is a 62 y/o Male who was under ER observation but now admitted to hospitalist team , pt. with acute chf with reduced EF. pt. 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 PAD s/p LE stent, right carotid endartectomy, small bowel AVM (no longer on plavix) who presented to the ED with complaints of leg swelling, shortness of breath, and orthopnea    Acute on chronic systolic heart failure   -Telemetry  -   - cont IV lasix 40 mg bid, increased Entresto doses  - cont metoprolol ER 25  -LEON EF 35-40%,MOR TR  - monitor lytes and I+O, daily weights  - HF team is following, plan for Cath /cardiomems today  -Social History: Is  from his wife, denies smoking, social alcohol use. Smokes marijuana for sleep.  -Abd US stable    COPD exacerbation  -  - given 1 dose iv steroids and cont with oral steroids  - nebs      History of CABG, PCI stent  History of MV repair  History of KOFI clip  Chronic Atrial Fibrillation   PVD stent  - hold eliquis for cath  - lipitor, metoprolol  - scd's    Hypothyroidism (s/p grave's disease)  - cont levothyroxine    MAEGAN:   -Pt states he has a mask but does not use it.   -Discussed the  importance of sleep apnea treatment.   -F/U  Pulmonology out pt.    DVT PPx  -scds    Dispo - active    Plan of care dw pt

## 2024-04-24 NOTE — PROGRESS NOTE ADULT - SUBJECTIVE AND OBJECTIVE BOX
Interval History: no acute events    Medications:  albuterol    0.083% 2.5 milliGRAM(s) Nebulizer every 6 hours  albuterol    0.083% 2.5 milliGRAM(s) Nebulizer every 4 hours PRN  albuterol    90 MICROgram(s) HFA Inhaler 1 Puff(s) Inhalation every 4 hours  apixaban 5 milliGRAM(s) Oral two times a day  aspirin  chewable 81 milliGRAM(s) Oral once  atorvastatin 40 milliGRAM(s) Oral at bedtime  dextrose 10% Bolus 125 milliLiter(s) IV Bolus once  dextrose 5%. 1000 milliLiter(s) IV Continuous <Continuous>  dextrose 5%. 1000 milliLiter(s) IV Continuous <Continuous>  dextrose 50% Injectable 12.5 Gram(s) IV Push once  dextrose 50% Injectable 25 Gram(s) IV Push once  dextrose Oral Gel 15 Gram(s) Oral once PRN  furosemide   Injectable 40 milliGRAM(s) IV Push two times a day  gabapentin 300 milliGRAM(s) Oral every 8 hours  glucagon  Injectable 1 milliGRAM(s) IntraMuscular once  insulin glargine Injectable (LANTUS) 30 Unit(s) SubCutaneous at bedtime  insulin lispro (ADMELOG) corrective regimen sliding scale   SubCutaneous at bedtime  insulin lispro (ADMELOG) corrective regimen sliding scale   SubCutaneous three times a day before meals  insulin lispro Injectable (ADMELOG) 5 Unit(s) SubCutaneous three times a day before meals  levothyroxine 150 MICROGram(s) Oral daily  metoprolol succinate ER 25 milliGRAM(s) Oral daily  predniSONE   Tablet 40 milliGRAM(s) Oral daily  sacubitril 49 mG/valsartan 51 mG 1 Tablet(s) Oral two times a day      Vitals:  T(C): 36.3 (24 @ 11:33), Max: 36.6 (24 @ 16:10)  HR: 64 (24 @ 15:55) (64 - 99)  BP: 123/71 (24 @ 15:55) (99/53 - 153/78)  BP(mean): 93 (24 @ 16:10) (93 - 93)  RR: 13 (24 @ 15:55) (10 - 18)  SpO2: 95% (24 @ 15:55) (94% - 98%)    Daily     Daily Weight in k.3 (2024 05:29)        I&O's Summary    2024 07:01  -  2024 07:00  --------------------------------------------------------  IN: 960 mL / OUT: 2300 mL / NET: -1340 mL    2024 07:01  -  2024 16:08  --------------------------------------------------------  IN: 0 mL / OUT: 400 mL / NET: -400 mL        Physical Exam:  Appearance: No Acute Distress  HEENT: PERRL  Neck: No JVD  Cardiovascular: Normal S1 S2, No murmurs/rubs/gallops  Respiratory: Clear to auscultation bilaterally  Gastrointestinal: Soft, Non-tender	  Skin: No cyanosis	  Neurologic: Non-focal  Extremities: No LE edema  Psychiatry: A & O x 3, Mood & affect appropriate    Labs:                        12.8   9.47  )-----------( 189      ( 2024 08:30 )             40.3         138  |  98  |  53.9<H>  ----------------------------<  188<H>  5.2   |  30.0<H>  |  1.53<H>    Ca    9.7      2024 08:30  Mg     2.4         TPro  8.0  /  Alb  4.1  /  TBili  0.5  /  DBili  x   /  AST  24  /  ALT  25  /  AlkPhos  84

## 2024-04-24 NOTE — DISCHARGE NOTE PROVIDER - PROVIDER TOKENS
PROVIDER:[TOKEN:[[id number]],FOLLOWUP:[2 weeks]],PROVIDER:[TOKEN:[[id number]],FOLLOWUP:[2 weeks]],PROVIDER:[TOKEN:[[id number]],FOLLOWUP:[1 week]] PROVIDER:[TOKEN:[[id number]],FOLLOWUP:[2 weeks]],PROVIDER:[TOKEN:[[id number]],FOLLOWUP:[2 weeks]],PROVIDER:[TOKEN:[[id number]],FOLLOWUP:[1 week]],PROVIDER:[TOKEN:[[id number]],FOLLOWUP:[1 week]],PROVIDER:[TOKEN:[[id number]],FOLLOWUP:[1 month]]

## 2024-04-24 NOTE — PROGRESS NOTE ADULT - ASSESSMENT
HF: Dr. Kirkland  Primary Cards: Dr. Negrete  EP: Dr. Hutchinson     62 y/o M with PMH of CAD s/p 1v CABG with MV repair, KOFI clip and MAZE (2003), s/p DARYA to ramus (4/2021), HFmrEF (LVEF 40-45%), AFib (on Eliquis), chronotropic incompetence, s/p dual chamber ICD (given suspected sarcoidosis with LV dysfunction, Dr. Aguirre at [place] 8/2021), VF s/p ICD shock, IDDM2 (A1C 8.5% 4/21/24), Grave's disease s/p radioactive iodine with subsequent hypothyroidism, untreated MAEGAN, COPD, PAD s/p leg stent and s/p right carotid endarterectomy, who presented to the ED with complaints of leg swelling, shortness of breath, and orthopnea.     Patient states that for the last month he has been experiencing worsening dyspnea on exertion and leg swelling. Patient was seen by Dr. Kirkland 04/03/24 and referred to [facility] for worsening symptoms, he was diagnosed with PNA there, and discharged home the following day. Patient states that ever since then his symptoms have gotten worse despite taking his lasix 40mg daily. Patient called Dr. Kirkland's office and increased his lasix to 60mg for the last 2 days, but when he had some chest pressure and dyspnea at rest he came to the ER to be evaluated.     Cardiac Studies:  4/19/24 TTE: LVEF 35-40%, LVIDd 5.1cm, mild LVH, mod RVE with mod RV dysfunction, mild MR, mod TR, mild TN, PASP 36 mmHg.    3/7/23 TTE: LVEF 40-45%, LVIDd 5.38cm, mild LVH, mild MR, mild-mod TR, mod PH (PASP 52 mmHg).    4/22/21 LHC: LM normal, pLAD 40%, D1 95%, supplied by patent bypass graft, Cx minor luminal irregularities, RCA mild luminal irregularities, Mckay 80% s/p DARYA, Ao 162/70/87, LVEDP 30

## 2024-04-24 NOTE — DISCHARGE NOTE PROVIDER - NSDCCPTREATMENT_GEN_ALL_CORE_FT
PRINCIPAL PROCEDURE  Procedure: Left heart catheterization  Findings and Treatment: No heavy lifting, driving, sex, tub baths, swimming, or any activity that submerges the lower half of the body in water for 48 hours.  Limited walking and stairs for 48 hours.    Change the bandaid after 24 hours and every 24 hours after that.  Keep the puncture site dry and covered with a bandaid until a scab forms.    Observe the site frequently.  If bleeding or a large lump (the size of a golf ball or bigger) occurs lie flat, apply continuous direct pressure just above the puncture site for at least 10 minutes, and notify your physician immediately.  If the bleeding cannot be controlled, call 911 immediately for assistance.  Notify your physician of pain, swelling or any drainage.    Notify your physician immediately if coldness, numbness, discoloration or pain in your foot occurs.        SECONDARY PROCEDURE  Procedure: Insertion of coronary artery stent  Findings and Treatment: Patient teaching done about DAPT  patient  is made aware to take  antiplatelet therapy as prescribed ( Statin and Aspirin and Plavix , ELIQUIS  as they are needed to keep your stent/s OPEN  discontinue aspirin after a month and continue eliquis and plavix   patient is aware to take them every day, do not miss or double up on any doses  these medications can only be discontinued by your cardiologist   TEACH BACK used to verify patient's understanding; pateint verbalizes understanding and gives positive feedback .

## 2024-04-24 NOTE — DISCHARGE NOTE PROVIDER - NSDCFUADDINST_GEN_ALL_CORE_FT
No heavy lifting, driving, sex, tub baths, swimming, or any activity that submerges the lower half of the body in water for 48 hours.  Limited walking and stairs for 48 hours.    Change the bandaid after 24 hours and every 24 hours after that.  Keep the puncture site dry and covered with a bandaid until a scab forms.    Observe the site frequently.  If bleeding or a large lump (the size of a golf ball or bigger) occurs lie flat, apply continuous direct pressure just above the puncture site for at least 10 minutes, and notify your physician immediately.  If the bleeding cannot be controlled, call 911 immediately for assistance.  Notify your physician of pain, swelling or any drainage.    Notify your physician immediately if coldness, numbness, discoloration or pain in your foot occurs.   No heavy lifting, driving, sex, tub baths, swimming, or any activity that submerges the lower half of the body in water for 48 hours.  Limited walking and stairs for 48 hours.    Change the bandaid after 24 hours and every 24 hours after that.  Keep the puncture site dry and covered with a bandaid until a scab forms.    Observe the site frequently.  If bleeding or a large lump (the size of a golf ball or bigger) occurs lie flat, apply continuous direct pressure just above the puncture site for at least 10 minutes, and notify your physician immediately.  If the bleeding cannot be controlled, call 911 immediately for assistance.  Notify your physician of pain, swelling or any drainage.    Notify your physician immediately if coldness, numbness, discoloration or pain in your foot occurs.    fluid restriction 1000 cc/day

## 2024-04-24 NOTE — DISCHARGE NOTE PROVIDER - CARE PROVIDER_API CALL
Nikky Kirkland  Adv Heart Fail [facility] Cardio  [address] [place] [address]  Phone: [phone]  Fax: [phone]  Follow Up Time: 2 weeks    Brandi Negrete  Cardiovascular Disease  [address] [place] [address]  Phone: [phone]  Fax: [phone]  Follow Up Time: 2 weeks    Hamlet Frey  Interventional Cardiology  [address] [facility], [address]  [place] [address]  Phone: [phone]  Fax: [phone]  Follow Up Time: 1 week   Nikky Kirkland  Adv [facility] Cardio  [address] [place] [address]  Phone: [phone]  Fax: [phone]  Follow Up Time: 2 weeks    Brandi Negrete  Cardiovascular Disease  [address] [place] [address]  Phone: [phone]  Fax: [phone]  Follow Up Time: 2 weeks    Hamlet Frey  Interventional Cardiology  [address] [facility], [address]  [place] [address]  Phone: [phone]  Fax: [phone]  Follow Up Time: 1 week    Mazin Light  Pulmonary Disease  [address] [facility], [address]  [place] [address]  Phone: [phone]  Fax: [phone]  Follow Up Time: 1 week    Alec Baldwin  Nephrology  [address] [facility], [place] [address]  Phone: [phone]  Fax: [phone]  Follow Up Time: 1 month

## 2024-04-24 NOTE — DISCHARGE NOTE PROVIDER - NPI NUMBER (FOR SYSADMIN USE ONLY) :
[[id number]],[[id number]],[[id number]] [[id number]]

## 2024-04-24 NOTE — DISCHARGE NOTE PROVIDER - CARE PROVIDERS DIRECT ADDRESSES
,[email or web address],DirectAddress_Unknown

## 2024-04-24 NOTE — DISCHARGE NOTE PROVIDER - HOSPITAL COURSE
Patient is a 62 y/o Male who was under ER observation but now admitted to hospitalist team , pt. with acute chf with reduced EF. pt. 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 PAD s/p LE stent, right carotid endartectomy, small bowel AVM (no longer on plavix) who presented to the ED with complaints of leg swelling, shortness of breath, and orthopnea. admitted for Acute on chronic systolic heart failure s/p IV lasix 40  increased Entresto doses, cont metoprolol .LEON EF 35-40% from 40-45%.,MOR TR. He had a LHC which showed a 70% prox LAD lesion which was stented. he will go home on aspirin, plavix and eliquis. After 1 month he we will stop the aspirins/p cardiac cath pci stent.seen by HF team.adjusted meds. Continue Toprol-XL 25mg nightly and entresto 49/51mg BID. Can resume home eplerenone on discharge. He did not tolerate SGLT2i as an outpatient Start bumex 2mg BID PO BID. per HF team.There was a concern for sarcoid on cardiac MRI and h/o VT. Will reorder PET as an outpatient.    COPD exacerbation. given 1 dose iv steroids and cont with taper oral steroids. f/u pulmonary for maegan. Need PFT. rec to use Cpap at home.    Acute kidney injury superimposed on CKD. Improve. at baseline. Recent baseline Cr ~1.4-1.59, no improved. f/u nephrology out pt.  BG high.on steroids. a1c 8.5. pt will check blood glucose. will take januvia with insulin. has appointment with endocrine.

## 2024-04-24 NOTE — PROGRESS NOTE ADULT - SUBJECTIVE AND OBJECTIVE BOX
Department of Cardiology                                                                  [facility] [address] [address] [facility] [place]                                                            Telephone: [phone]. Fax:[phone]                                                    Post- Procedure Note: Left Heart Cardiac Catheterization       Narrative:   Patient now s/p eft heart catheterization via RFA approach (RFA sealed with angioseal, RFV sheath placed, Right heart cath not performed, S/P manual pull of RFV sheath)  with Dr. Frey, tolerated procedure well without complications.  Arrived to recovery room NAD and hemodynamically stable, distal pulse +, neurovascular status intact            PAST MEDICAL & SURGICAL HISTORY:  Hypertension      Hyperlipidemia      Stage 2 chronic kidney disease      Atrial fibrillation      CAD (coronary artery disease)      H/O mitral valve stenosis      Hypothyroidism      H/O erectile dysfunction      PAD (peripheral artery disease)      MAEGAN on CPAP      Chronotropic incompetence with sinus node dysfunction      CAD (coronary artery disease)      DM (diabetes mellitus)      AF (paroxysmal atrial fibrillation)      PAD (peripheral artery disease)      H/O CHF      Hypothyroidism      S/P CABG x 1      H/O salivary gland disease  S/p excision      S/P carotid endarterectomy      S/P CABG (coronary artery bypass graft)      H/O mitral valve repair      History of implantable cardiac defibrillator (ICD)        Home Medications:  acetaminophen 325 mg oral tablet: 2 tab(s) orally every 6 hours, As needed, Moderate Pain (4 - 6) (06 Aug 2021 16:03)  Albuterol (Eqv-Proventil HFA) 90 mcg/inh inhalation aerosol: 2 puff(s) inhaled 4 times a day as needed for  bronchospasm (22 Apr 2024 14:10)  amLODIPine 10 mg oral tablet: 1 tab(s) orally once a day (22 Apr 2024 14:10)  amlodipine-valsartan 5 mg-160 mg oral tablet: HOLD THIS MEDICATION UNTIL YOU SEE YOUR PRIMARY CARE PROVIDER   1 tab(s) orally once a day (07 Aug 2021 12:16)  apixaban 5 mg oral tablet: 1 tab(s) orally 2 times a day, HOLD, RESTART ON TUESDAY  (07 Aug 2021 11:05)  atorvastatin 40 mg oral tablet: 1 tab(s) orally once a day (at bedtime) (22 Apr 2024 14:10)  clopidogrel 75 mg oral tablet: 1 tab(s) orally once a day (05 Aug 2021 12:38)  Eliquis 5 mg oral tablet: 1 tab(s) orally 2 times a day (22 Apr 2024 14:10)  Entresto 24 mg-26 mg oral tablet: 1 tab(s) orally 2 times a day (22 Apr 2024 14:10)  eplerenone 25 mg oral tablet: 1 tab(s) orally once a day (05 Aug 2021 12:38)  furosemide 40 mg oral tablet: 1 tab(s) orally once a day (05 Aug 2021 12:38)  gabapentin 300 mg oral capsule: 1 cap(s) orally 3 times a day (22 Apr 2024 14:10)  insulin detemir 100 units/mL subcutaneous solution: 32 unit(s) subcutaneous once a day in the morning and 36 units in the evening  (07 Aug 2021 11:34)  Januvia 100 mg oral tablet: 1 tab(s) orally once a day (05 Aug 2021 12:38)  Lantus 100 units/mL subcutaneous solution: 30 subcutaneous 2 times a day (22 Apr 2024 14:10)  levothyroxine 150 mcg (0.15 mg) oral tablet: 1 tab(s) orally once a day (05 Aug 2021 12:38)  levothyroxine 150 mcg (0.15 mg) oral tablet: 1 tab(s) orally once a day (22 Apr 2024 14:10)  Lipitor 80 mg oral tablet: 1 tab(s) orally once a day (05 Aug 2021 12:38)  metFORMIN 500 mg oral tablet, extended release: 1 tab(s) orally 2 times a day (05 Aug 2021 12:38)  NovoLOG 100 units/mL subcutaneous solution: 5 unit(s) subcutaneous 3 times a day (05 Aug 2021 12:38)  novolog pre meal per sliding scale.:  (22 Apr 2024 14:10)  tadalafil 5 mg oral tablet: 1 tab(s) orally once a day (05 Aug 2021 12:38)        lisinopril (Unknown)  penicillin (Unknown)  penicillins (Hives)      Objective:  Vital Signs Last 24 Hrs  T(C): 36.3 (24 Apr 2024 11:33), Max: 36.6 (23 Apr 2024 16:10)  T(F): 97.4 (24 Apr 2024 11:33), Max: 97.9 (23 Apr 2024 16:10)  HR: 66 (24 Apr 2024 14:25) (66 - 83)  BP: 99/53 (24 Apr 2024 14:25) (99/53 - 153/78)  BP(mean): 93 (23 Apr 2024 16:10) (93 - 93)  RR: 17 (24 Apr 2024 14:25) (17 - 18)  SpO2: 98% (24 Apr 2024 14:25) (94% - 98%)    Parameters below as of 24 Apr 2024 14:25  Patient On (Oxygen Delivery Method): room air        GENERAL: Pt lying comfortably, NAD.  ENMT: PERRL, +EOMI.  NECK: soft, Supple, No JVD,   CHEST/LUNG: Clear to auscultatation bilaterally; No wheezing.  HEART: S1S2+, Regular rate and rhythm; No murmurs.  ABDOMEN: Soft, Nontender, Nondistended; Bowel sounds present.  MUSCULOSKELETAL: Normal range of motion.  SKIN: No rashes or lesions.  NEURO: AAOX3, no focal deficits, no motor r sensory loss.  PSYCH: normal mood.  Procedure site: RFV (Right heart cath not performed) , RFA, no signs of bleeding or hematoma, neurovascular intact   VASCULAR:   Radial +2 R/+2 L  Femoral +2 R/+2 L  PT +2 R/+2 L  DP +2 R/+2 L                          12.8   9.47  )-----------( 189      ( 24 Apr 2024 08:30 )             40.3     04-24    138  |  98  |  53.9<H>  ----------------------------<  188<H>  5.2   |  30.0<H>  |  1.53<H>    Ca    9.7      24 Apr 2024 08:30  Mg     2.4     04-23    TPro  8.0  /  Alb  4.1  /  TBili  0.5  /  DBili  x   /  AST  24  /  ALT  25  /  AlkPhos  84  04-23

## 2024-04-24 NOTE — CONSULT NOTE ADULT - SUBJECTIVE AND OBJECTIVE BOX
ADVANCED HEART FAILURE - CONSULT NOTE  [address] [place] [address]  Office Phone: [phone]/Fax: [phone]  Service/On Call Phone [phone]  _______________________________________________________________________________________________________    HPI:  64 y/o M with PMH of CAD s/p 1v CABG with MV repair, KOFI clip and MAZE (2003), s/p DARYA to ramus (4/2021), HFmrEF (LVEF 40-45%), AFib (on Eliquis), chronotropic incompetence, s/p dual chamber ICD (given suspected sarcoidosis with LV dysfunction, Dr. Aguirre at [place] 8/2021), VF s/p ICD shock, IDDM2 (A1C 8.5% 4/21/24), Grave's disease s/p radioactive iodine with subsequent hypothyroidism, untreated MAEGAN, COPD, PAD s/p leg stent and s/p right carotid endarterectomy, who presented to the ED with complaints of leg swelling, shortness of breath, and orthopnea.     Patient states that for the last month he has been experiencing worsening dyspnea on exertion and leg swelling. Patient was seen by Dr. Kirkland 04/03/24 and referred to [facility] for worsening symptoms, he was diagnosed with PNA there, and discharged home the following day. Patient states that ever since then his symptoms have gotten worse despite taking his lasix 40mg daily. Patient called Dr. Kirkland's office and increased his lasix to 60mg for the last 2 days, but when he had some chest pressure and dyspnea at rest he came to the ER to be evaluated.       PAST MEDICAL & SURGICAL HISTORY:  CAD (coronary artery disease)      DM (diabetes mellitus)      AF (paroxysmal atrial fibrillation)      PAD (peripheral artery disease)      H/O CHF      Hypothyroidism      S/P CABG (coronary artery bypass graft)      H/O mitral valve repair      History of implantable cardiac defibrillator (ICD)        REVIEW OF SYSTEMS:  14 point ROS negative in detail apart from as documented in HPI.    MEDICATIONS  (STANDING):  albuterol    0.083% 2.5 milliGRAM(s) Nebulizer every 6 hours  albuterol    90 MICROgram(s) HFA Inhaler 1 Puff(s) Inhalation every 4 hours  apixaban 5 milliGRAM(s) Oral every 12 hours  atorvastatin 40 milliGRAM(s) Oral at bedtime  dextrose 10% Bolus 125 milliLiter(s) IV Bolus once  dextrose 5%. 1000 milliLiter(s) (50 mL/Hr) IV Continuous <Continuous>  dextrose 5%. 1000 milliLiter(s) (100 mL/Hr) IV Continuous <Continuous>  dextrose 50% Injectable 25 Gram(s) IV Push once  dextrose 50% Injectable 12.5 Gram(s) IV Push once  furosemide   Injectable 40 milliGRAM(s) IV Push two times a day  gabapentin 300 milliGRAM(s) Oral every 8 hours  glucagon  Injectable 1 milliGRAM(s) IntraMuscular once  insulin glargine Injectable (LANTUS) 30 Unit(s) SubCutaneous at bedtime  insulin lispro (ADMELOG) corrective regimen sliding scale   SubCutaneous three times a day before meals  insulin lispro (ADMELOG) corrective regimen sliding scale   SubCutaneous at bedtime  insulin lispro Injectable (ADMELOG) 5 Unit(s) SubCutaneous three times a day before meals  levothyroxine 150 MICROGram(s) Oral daily  metoprolol succinate ER 25 milliGRAM(s) Oral daily  sacubitril 24 mG/valsartan 26 mG 1 Tablet(s) Oral two times a day    MEDICATIONS  (PRN):  albuterol    0.083% 2.5 milliGRAM(s) Nebulizer every 4 hours PRN Shortness of Breath and/or Wheezing  dextrose Oral Gel 15 Gram(s) Oral once PRN Blood Glucose LESS THAN 70 milliGRAM(s)/deciliter    Home Medications:  Albuterol (Eqv-Proventil HFA) 90 mcg/inh inhalation aerosol: 2 puff(s) inhaled 4 times a day as needed for  bronchospasm (20 Apr 2024 23:28)  amLODIPine 10 mg oral tablet: 1 tab(s) orally once a day (20 Apr 2024 22:58)  atorvastatin 40 mg oral tablet: 1 tab(s) orally once a day (at bedtime) (20 Apr 2024 22:57)  Eliquis 5 mg oral tablet: 1 tab(s) orally 2 times a day (20 Apr 2024 22:56)  Entresto 24 mg-26 mg oral tablet: 1 tab(s) orally 2 times a day (20 Apr 2024 22:55)  gabapentin 300 mg oral capsule: 1 cap(s) orally 3 times a day (20 Apr 2024 22:56)  Lantus 100 units/mL subcutaneous solution: 30 subcutaneous 2 times a day (20 Apr 2024 23:00)  levothyroxine 150 mcg (0.15 mg) oral tablet: 1 tab(s) orally once a day (20 Apr 2024 22:59)  novolog pre meal per sliding scale.:  (20 Apr 2024 23:14)    Allergies  penicillins (Hives)    Social History:  stopped  cig. smoking 2004. (20 Apr 2024 22:48)    FAMILY HISTORY:  FH: CHF (congestive heart failure) (Mother, Sibling)    Family history of diabetes mellitus (DM) (Sibling)      ICU Vital Signs Last 24 Hrs  T(C): 36.4, Max: 36.8 (04-21 @ 16:07)  HR: 71 (64 - 76)  BP: 132/61 (116/54 - 150/69)  BP(mean): --  ABP: --  ABP(mean): --  RR: 16 (16 - 18)  SpO2: 92% (90% - 96%)        I&O's Summary Last 24 Hrs    IN: 0 mL / OUT: 800 mL / NET: -800 mL      Tele: SR 70-80s    Physical Exam:    General: No distress. Comfortable.  Neck: JVP not elevated.  Respiratory: Clear to auscultation bilaterally  CV: RRR. Normal S1 and S2. No murmurs, rub, or gallops. Radial pulses normal.  Abdomen: Soft, non-distended, non-tender  Extremities: Warm, no edema  Neurology: Non-focal, alert and oriented times three.   Psych: Affect normal    Labs:    ( 04-21-24 @ 03:44 )               11.7   7.07  )--------( 175                  35.7     ( 04-22-24 @ 05:10 )     139  |  101  |  55.0  ---------------------<  155  4.9  |  27.0  |  1.69    Ca 9.0  Phos x   Mg 2.3    ( 04-21-24 @ 03:44 )  TPro  7.3  /  Alb  3.8  /  TBili  0.5  /  DBili  x   /  AST  22  /  ALT  20  /  AlkPhos  84  ( 04-20-24 @ 09:23 )  TPro  7.6  /  Alb  3.9  /  TBili  0.6  /  DBili  x   /  AST  23  /  ALT  20  /  AlkPhos  87    ( 04-19-24 @ 18:45 )  TropHS 28    / CK x     / CKMB x      ( 04-19-24 @ 17:29 )  TropHS 29    / CK x     / CKMB x      
                                                                                Department of Cardiology                                                                  [facility] [address] [address] [facility]                                                            Telephone: [phone]. Fax:[phone]                                                                                      Cardiac Cath NP Note           Patient is a 63y old  Male who presents with a chief complaint of acute chf (2024 07:31)    Cardiology consulting for : HF symptoms, for R/LHC with cardiomems placement.  Overnight events: none  Plan: R/LHC with cardiomems palcement    HPI:    62 y/o M with PMH of CAD s/p one vessel  CABG IN , with MV repair, KOFI clip and MAZE (), s/p DARYA to ramus (2021), HFmrEF (LVEF 40-45%), AFib (on Eliquis), chronotropic incompetence, s/p dual chamber ICD (given suspected sarcoidosis with LV dysfunction, Dr. Aguirre at [place] 2021), VF s/p ICD shock, IDDM2 (A1C 8.5% 24), Grave's disease s/p radioactive iodine with subsequent hypothyroidism, untreated MAEGAN, COPD, PAD s/p leg stent and s/p right carotid endarterectomy, who presented to the ED with complaints of leg swelling, shortness of breath, and orthopnea.   Patient states that for the last month he has been experiencing worsening dyspnea on exertion and leg swelling. Patient was seen by Dr. Kirkland 24 and referred to [facility] for worsening symptoms, he was diagnosed with PNA there, and discharged home the following day. Patient states that ever since then his symptoms have gotten worse despite taking his lasix 40mg daily. Patient called Dr. Kirkland's office and increased his lasix to 60mg for the last 2 days, but when he had some chest pressure and dyspnea at rest he came to the ER to be evaluated.   -LVEF now 35-40% from 40-45%  24 TTE: LVEF 35-40%, LVIDd 5.1cm, mild LVH, mod RVE with mod RV dysfunction, mild MR, mod TR, mild MT, PASP 36 mmHg.  Patient currently with c/o OBRIEN with mild activity, denies HA, dizziness, CP, pnd  Acute kidney injury superimposed on CKD., Cr: 1.53, fluctuating around baseline  -Plan for R/LHC with CardioMEMS implant to prevent recurrent HF hospitalizations.    PAST MEDICAL & SURGICAL HISTORY:  Hypertension      Hyperlipidemia      Stage 2 chronic kidney disease      Atrial fibrillation      CAD (coronary artery disease)      H/O mitral valve stenosis      Hypothyroidism      H/O erectile dysfunction      PAD (peripheral artery disease)      MAEGAN on CPAP      Chronotropic incompetence with sinus node dysfunction      CAD (coronary artery disease)      DM (diabetes mellitus)      AF (paroxysmal atrial fibrillation)      PAD (peripheral artery disease)      H/O CHF      Hypothyroidism      S/P CABG x 1      H/O salivary gland disease  S/p excision      S/P carotid endarterectomy      S/P CABG (coronary artery bypass graft)      H/O mitral valve repair      History of implantable cardiac defibrillator (ICD)          RADIOLOGY & ADDITIONAL STUDIES/DIAGNOSTIC TESTING:      ECHO  FINDINGS:    < from: TTE W or WO Ultrasound Enhancing Agent (24 @ 19:00) >   1. Septal motion is abnormal consistent with previous cardiac surgery. Left ventricular systolic function is moderately decreased with an ejection fraction visually estimated at 35 to 40 %.   2. Mild left ventricular hypertrophy.   3. The left ventricular diastolic function is indeterminate, with indeterminant filling pressure.   4. Moderately enlarged right ventricular cavity size and moderately reduced systolic function.   5. Estimated pulmonary artery systolic pressure is 36 mmHg.   6. Moderate tricuspid regurgitation.   7. Mitral annuloplasty ring present with mild residual regurgitation.   8. Mild pulmonic regurgitation.   9. The left atrium is normal in size.  10. The right atrium is mildly dilated.  11. No pericardial effusion seen.  12. Compared to the transthoracic echocardiogram performed on 3/2023, prior LV EF reportedly of 40-45%.      < end of copied text >      STRESS  FINDINGS: NA    CATHETERIZATION  FINDINGS:    < from: Cardiac Cath Lab - Adult (21 @ 14:05) >    significant ramus diseasesuccessful intervention of ramus with    DESx1.Continue with triple therapy for 2 weeks after which    aspirin can be discontinued and continue with Plavix and Eliquis for a    minimum of 1 year.    Continue with post procedure hydration per protocol.     Acute complication:    No complications       < end of copied text >        Allergies    lisinopril (Unknown)  penicillin (Unknown)  penicillins (Hives)    Intolerances        MEDICATIONS  (STANDING):  albuterol    0.083% 2.5 milliGRAM(s) Nebulizer every 6 hours  albuterol    90 MICROgram(s) HFA Inhaler 1 Puff(s) Inhalation every 4 hours  atorvastatin 40 milliGRAM(s) Oral at bedtime  dextrose 10% Bolus 125 milliLiter(s) IV Bolus once  dextrose 5%. 1000 milliLiter(s) (100 mL/Hr) IV Continuous <Continuous>  dextrose 5%. 1000 milliLiter(s) (50 mL/Hr) IV Continuous <Continuous>  dextrose 50% Injectable 12.5 Gram(s) IV Push once  dextrose 50% Injectable 25 Gram(s) IV Push once  furosemide   Injectable 40 milliGRAM(s) IV Push two times a day  gabapentin 300 milliGRAM(s) Oral every 8 hours  glucagon  Injectable 1 milliGRAM(s) IntraMuscular once  insulin glargine Injectable (LANTUS) 30 Unit(s) SubCutaneous at bedtime  insulin lispro (ADMELOG) corrective regimen sliding scale   SubCutaneous at bedtime  insulin lispro (ADMELOG) corrective regimen sliding scale   SubCutaneous three times a day before meals  insulin lispro Injectable (ADMELOG) 5 Unit(s) SubCutaneous three times a day before meals  levothyroxine 150 MICROGram(s) Oral daily  metoprolol succinate ER 25 milliGRAM(s) Oral daily  predniSONE   Tablet 40 milliGRAM(s) Oral daily  sacubitril 49 mG/valsartan 51 mG 1 Tablet(s) Oral two times a day    MEDICATIONS  (PRN):  albuterol    0.083% 2.5 milliGRAM(s) Nebulizer every 4 hours PRN Shortness of Breath and/or Wheezing  dextrose Oral Gel 15 Gram(s) Oral once PRN Blood Glucose LESS THAN 70 milliGRAM(s)/deciliter      FAMILY HISTORY:  FH: CAD (coronary artery disease)  Parents, siblings    FH: CHF (congestive heart failure) (Mother, Sibling)    Family history of diabetes mellitus (DM) (Sibling)        SOCIAL HISTORY:    CIGARETTES:Former smoker, Quit many years ago     ALCOHOL occasional Beer     REVIEW OF SYSTEMS:  General: No fevers/chills, or fatigue  HEENT: No visual disturbances, no hearing loss, no headaches, no epistaxis.  CV: No chest pain,no palpitations, no edema, no orthopnea, no PND, or OBRIEN  Respiratory: No dyspnea, no wheeze, no cough.  GI: no nausea/vomiting, no black/bloody stools.  : No hematuria  Musculoskeletal: No myalgias, no arthralgias, no back pain.    Vital Signs Last 24 Hrs  T(C): 36.3 (2024 11:33), Max: 36.6 (2024 16:10)  T(F): 97.4 (2024 11:33), Max: 97.9 (2024 16:10)  HR: 69 (2024 11:33) (69 - 83)  BP: 153/78 (2024 11:33) (129/69 - 153/78)  BP(mean): 93 (2024 16:10) (93 - 93)  RR: 17 (2024 11:33) (17 - 18)  SpO2: 97% (2024 11:33) (94% - 98%)    Parameters below as of 2024 11:00  Patient On (Oxygen Delivery Method): room air        Daily     Daily Weight in k.3 (2024 05:29)    I&O's Detail    2024 07:01  -  2024 07:00  --------------------------------------------------------  IN:    Oral Fluid: 960 mL  Total IN: 960 mL    OUT:    Voided (mL): 2300 mL  Total OUT: 2300 mL    Total NET: -1340 mL          PHYSICAL EXAM:   GENERAL: Pt lying comfortably, NAD.  ENMT: PERRL, +EOMI.  NECK: soft, Supple, No JVD,   CHEST/LUNG: Clear to auscultatation bilaterally; No wheezing.  HEART: S1S2+, Regular rate and rhythm; No murmurs.  ABDOMEN: Soft, Nontender, Nondistended; Bowel sounds present.  MUSCULOSKELETAL: Normal range of motion.  SKIN: No rashes or lesions.  NEURO: AAOX3, no focal deficits, no motor r sensory loss.  PSYCH: normal mood.  VASCULAR:   Radial +2 R/+2 L  Femoral +2 R/+2 L  PT +2 R/+2 L  DP +2 R/+2 L      INTERPRETATION OF TELEMETRY:    ECG:    LABS:                        12.8   9.47  )-----------( 189      ( 2024 08:30 )             40.3     04-24    138  |  98  |  53.9<H>  ----------------------------<  188<H>  5.2   |  30.0<H>  |  1.53<H>    Ca    9.7      2024 08:30  Mg     2.4         TPro  8.0  /  Alb  4.1  /  TBili  0.5  /  DBili  x   /  AST  24  /  ALT  25  /  AlkPhos  84            Urinalysis Basic - ( 2024 08:30 )    Color: x / Appearance: x / SG: x / pH: x  Gluc: 188 mg/dL / Ketone: x  / Bili: x / Urobili: x   Blood: x / Protein: x / Nitrite: x   Leuk Esterase: x / RBC: x / WBC x   Sq Epi: x / Non Sq Epi: x / Bacteria: x      I&O's Summary    2024 07:01  -  2024 07:00  --------------------------------------------------------  IN: 960 mL / OUT: 2300 mL / NET: -1340 mL      BNP: 1071            
Patient is a 63y old  Male who presents with a chief complaint of acute chf (2024 16:08)      HPI:  Patient is a 62 y/o Male who was under ER observation but now admitted to hospitalist team , pt. with acute chf with reduced EF. pt. 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 PAD s/p LE stent, right carotid endartectomy, small bowel AVM (no longer on plavix) who presented to the ED with complaints of leg swelling, shortness of breath, and orthopnea. Patient states that for the last month he has been experiencing worsening dyspnea on exertion and leg swelling. Patient was seen by Dr. Kirkland 24 and referred to [facility] for worsening symptoms, he was diagnosed with PNA there, and discharged home the following day. Patient states that ever since then his symptoms have gotten worse despite taking his lasix 40mg daily. Patient called Dr. Kirkland's office and increased his lasix to 60mg for the last 2 days, but when he had some chest pressure and dyspnea at rest he came to the ER to be evaluated. Patient currently denies fevers, chills, CP, syncope, near syncope, headache. abd. pain, n/v/d.    (2024 22:48)    Called to see patient after his cardiac cath today re . his CKD   In  - creat was 1.7  He does not follow with Nephrology   Periodic NSAIDS   No voiding issues   Cardiac intervention  and prep measures noted       PAST MEDICAL & SURGICAL HISTORY:  Hypertension      Hyperlipidemia      Stage 2 chronic kidney disease      Atrial fibrillation      CAD (coronary artery disease)      H/O mitral valve stenosis      Hypothyroidism      H/O erectile dysfunction      PAD (peripheral artery disease)      MAEGAN on CPAP      Chronotropic incompetence with sinus node dysfunction      CAD (coronary artery disease)      DM (diabetes mellitus)      AF (paroxysmal atrial fibrillation)      PAD (peripheral artery disease)      H/O CHF      Hypothyroidism      S/P CABG x 1      H/O salivary gland disease  S/p excision      S/P carotid endarterectomy      S/P CABG (coronary artery bypass graft)      H/O mitral valve repair      History of implantable cardiac defibrillator (ICD)          FAMILY HISTORY:  FH: CAD (coronary artery disease)  Parents, siblings    FH: CHF (congestive heart failure) (Mother, Sibling)    Family history of diabetes mellitus (DM) (Sibling)        Social History:    MEDICATIONS  (STANDING):  albuterol    0.083% 2.5 milliGRAM(s) Nebulizer every 6 hours  albuterol    90 MICROgram(s) HFA Inhaler 1 Puff(s) Inhalation every 4 hours  apixaban 5 milliGRAM(s) Oral two times a day  atorvastatin 40 milliGRAM(s) Oral at bedtime  dextrose 10% Bolus 125 milliLiter(s) IV Bolus once  dextrose 5%. 1000 milliLiter(s) (100 mL/Hr) IV Continuous <Continuous>  dextrose 5%. 1000 milliLiter(s) (50 mL/Hr) IV Continuous <Continuous>  dextrose 50% Injectable 12.5 Gram(s) IV Push once  dextrose 50% Injectable 25 Gram(s) IV Push once  gabapentin 300 milliGRAM(s) Oral every 8 hours  glucagon  Injectable 1 milliGRAM(s) IntraMuscular once  insulin glargine Injectable (LANTUS) 30 Unit(s) SubCutaneous at bedtime  insulin lispro (ADMELOG) corrective regimen sliding scale   SubCutaneous at bedtime  insulin lispro (ADMELOG) corrective regimen sliding scale   SubCutaneous three times a day before meals  insulin lispro Injectable (ADMELOG) 5 Unit(s) SubCutaneous three times a day before meals  levothyroxine 150 MICROGram(s) Oral daily  metoprolol succinate ER 25 milliGRAM(s) Oral daily  predniSONE   Tablet 40 milliGRAM(s) Oral daily  sacubitril 49 mG/valsartan 51 mG 1 Tablet(s) Oral two times a day    MEDICATIONS  (PRN):  albuterol    0.083% 2.5 milliGRAM(s) Nebulizer every 4 hours PRN Shortness of Breath and/or Wheezing  dextrose Oral Gel 15 Gram(s) Oral once PRN Blood Glucose LESS THAN 70 milliGRAM(s)/deciliter      Allergies    lisinopril (Unknown)  penicillin (Unknown)  penicillins (Hives)    Intolerances            Vital Signs Last 24 Hrs  T(C): 36.3 (2024 11:33), Max: 36.6 (2024 22:09)  T(F): 97.4 (2024 11:33), Max: 97.8 (2024 22:09)  HR: 64 (2024 15:55) (64 - 99)  BP: 123/71 (2024 15:55) (99/53 - 153/78)  BP(mean): --  RR: 13 (2024 15:55) (10 - 18)  SpO2: 95% (2024 15:55) (94% - 98%)    Parameters below as of 2024 15:55  Patient On (Oxygen Delivery Method): room air      Daily     Daily Weight in k.3 (2024 05:29)  I&O's Detail    2024 07:  -  2024 07:00  --------------------------------------------------------  IN:    Oral Fluid: 960 mL  Total IN: 960 mL    OUT:    Voided (mL): 2300 mL  Total OUT: 2300 mL    Total NET: -1340 mL      2024 07:  -  2024 17:59  --------------------------------------------------------  IN:  Total IN: 0 mL    OUT:    Voided (mL): 400 mL  Total OUT: 400 mL    Total NET: -400 mL        I&O's Summary    2024 07:  -  2024 07:00  --------------------------------------------------------  IN: 960 mL / OUT: 2300 mL / NET: -1340 mL    2024 07:  -  2024 17:59  --------------------------------------------------------  IN: 0 mL / OUT: 400 mL / NET: -400 mL        PHYSICAL EXAM:    GENERAL: NAD, laying flat   HEAD:  Atraumatic, Normocephalic  NECK: Supple, No JVD, Normal thyroid  NERVOUS SYSTEM:  Alert & Oriented X3,   CHEST/LUNG: Clear / EAE . No wheeze   HEART: Regular rate and rhythm; No murmurs, rubs, or gallops  ABDOMEN: Soft, Nontender, Nondistended; Bowel sounds present  EXTREMITIES:  Min edema     LABS:                        12.8   9.47  )-----------( 189      ( 2024 08:30 )             40.3     04    138  |  98  |  53.9<H>  ----------------------------<  188<H>  5.2   |  30.0<H>  |  1.53<H>    Ca    9.7      2024 08:30  Mg     2.4     04-    TPro  8.0  /  Alb  4.1  /  TBili  0.5  /  DBili  x   /  AST  24  /  ALT  25  /  AlkPhos  84  04      Urinalysis Basic - ( 2024 08:30 )    Color: x / Appearance: x / SG: x / pH: x  Gluc: 188 mg/dL / Ketone: x  / Bili: x / Urobili: x   Blood: x / Protein: x / Nitrite: x   Leuk Esterase: x / RBC: x / WBC x   Sq Epi: x / Non Sq Epi: x / Bacteria: x          < from: US Abdomen Limited (24 @ 13:03) >    ACC: [id number] EXAM:  US ABDOMEN LIMITED   ORDERED BY: RITA CROWDER     PROCEDURE DATE:  2024          INTERPRETATION:  CLINICAL INFORMATION: Abdominal ascites. Abdominal   distention  COMPARISON: None available.  TECHNIQUE: Limited four-quadrant abdominal ultrasound performed to assess   for ascites.    FINDINGS/  IMPRESSION:  1.  No intra-abdominal ascites observed on ultrasound.  2.  Etiology of abdominal distention should be determined by another means    --- End of Report ---            CHIQUITA MORAN MD; Attending Radiologist  This docum    < end of copied text >      RADIOLOGY & ADDITIONAL TESTS:  
                                             [facility]                                              CARDIOLOGY AT [address], [place] [address]                                             Telephone: [phone]. Fax:[phone]                                                       CARDIOLOGY CONSULTATION NOTE                                                                                             History obtained by: Patient and medical record  Community Cardiologist: Dr. Kirkland   obtained: Yes [  ] No [ X ]  Reason for Consultation: Acute HFrEF  Available out pt records reviewed: Yes [ X ] No [  ]    Chief complaint:    Patient is a 63y old  Male who presents with a chief complaint of shortness of breath.    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 Patient states that for the last month he has been experiencing worsening dyspnea on exertion and leg swelling. Patient was seen by Dr. Kirkland 04/03/24 and referred to [facility] for worsening symptoms, he was diagnosed with PNA there, and discharged home the following day. Patient states that ever since then his symptoms have gotten worse despite taking his lasix 40mg daily. Patient called Dr. Kirkland's office and increased his lasix to 60mg for the last 2 days, but when he also had some chest pressure and dyspnea at rest he came to the ER to be evaluated. Patient currently denies fevers, chills, CP, syncope, near syncope, N/V/D, headache, or dizziness.      CARDIAC TESTING   ECHO:  Echo 03/23:   EF 40-45%, mild LVH, mild MR, mild to mod TR, mod PH.    STRESS:    CATH:   [facility] 2021  LM: Normal   D1: 95% s/p graft patent  pLAD 40%  LCx: MLI  RCA: MLI  RI 80% s/p DARYA x 1    PAST MEDICAL HISTORY      PAST SURGICAL HISTORY      SOCIAL HISTORY:  CIGARETTES: Former smoker, quit 20 years ago    ALCOHOL: Social EtOH  DRUGS: Denies    FAMILY HISTORY:    Family History of Cardiovascular Disease:  Yes [ X ] No [  ]  Coronary Artery Disease in first degree relative: Yes [ X ] No [  ]  Sudden Cardiac Death in First degree relative: Yes [  ] No [  ]    HOME MEDICATIONS:      CURRENT CARDIAC MEDICATIONS:  furosemide   Injectable 80 milliGRAM(s) IV Push Once, Stop order after: 1 Doses      CURRENT OTHER MEDICATIONS:      ALLERGIES:   penicillins (Hives)      REVIEW OF SYMPTOMS:   CONSTITUTIONAL: No fever, no chills, no weight loss, no weight gain, no fatigue   ENMT:  No vertigo; No sinus or throat pain  NECK: No pain or stiffness  CARDIOVASCULAR: AS PER HPI  RESPIRATORY: AS PER HPI  : No dysuria, no hematuria   GI: No dark color stool, no nausea, no diarrhea, no constipation, no abdominal pain   NEURO: No headache, no slurred speech   MUSCULOSKELETAL: No joint pain or swelling; No muscle, back, or extremity pain  PSYCH: No agitation, no anxiety.    ALL OTHER REVIEW OF SYSTEMS ARE NEGATIVE.    VITAL SIGNS:  T(C): 36.4 (04-19-24 @ 15:14), Max: 36.4 (04-19-24 @ 15:14)  T(F): 97.6 (04-19-24 @ 15:14), Max: 97.6 (04-19-24 @ 15:14)  HR: 98 (04-19-24 @ 15:14) (98 - 98)  BP: 136/71 (04-19-24 @ 15:14) (136/71 - 136/71)  RR: 22 (04-19-24 @ 15:14) (22 - 22)  SpO2: 95% (04-19-24 @ 15:14) (95% - 95%)    INTAKE AND OUTPUT:       PHYSICAL EXAM:  Constitutional: Comfortable . No acute distress.   HEENT: Atraumatic and normocephalic , neck is supple . no JVD. No carotid bruit.  CNS: A&Ox3. No focal deficits.   Respiratory: Trace bibasilar rales  Cardiovascular: RRR normal s1 s2. No rubs or gallop. III/VI systolic murmur lsb  Gastrointestinal: Soft, non-tender. +Bowel sounds.   Extremities: 2+ Peripheral Pulses, No clubbing, cyanosis, 2+ b/l LE edema  Psychiatric: Calm . no agitation.   Skin: Warm and dry, no ulcers on extremities     LABS:                            11.5   7.28  )-----------( 164      ( 19 Apr 2024 15:50 )             36.5     04-19    140  |  104  |  37.9<H>  ----------------------------<  141<H>  5.0   |  24.0  |  1.52<H>    Ca    8.9      19 Apr 2024 15:50    TPro  7.5  /  Alb  3.8  /  TBili  0.4  /  DBili  x   /  AST  25  /  ALT  20  /  AlkPhos  78  04-19      Urinalysis Basic - ( 19 Apr 2024 15:50 )    Color: x / Appearance: x / SG: x / pH: x  Gluc: 141 mg/dL / Ketone: x  / Bili: x / Urobili: x   Blood: x / Protein: x / Nitrite: x   Leuk Esterase: x / RBC: x / WBC x   Sq Epi: x / Non Sq Epi: x / Bacteria: x          INTERPRETATION OF TELEMETRY:     ECG: Atrial fibrillation, PRWP, NSST/T wave abnormalities   Prior ECG: Yes [  ] No [  ]    RADIOLOGY & ADDITIONAL STUDIES:    X-ray:      CT scan:     MRI:   US:

## 2024-04-24 NOTE — CONSULT NOTE ADULT - CONSULT REASON
CKD -- post cardiac cath
recurrent hf symptoms  for R/LHC with cardiomems placement
Acute HFrEF
Acute on chronic systolic heart failure

## 2024-04-24 NOTE — DISCHARGE NOTE PROVIDER - NSDCMRMEDTOKEN_GEN_ALL_CORE_FT
acetaminophen 325 mg oral tablet: 2 tab(s) orally every 6 hours, As needed, Moderate Pain (4 - 6)  Albuterol (Eqv-Proventil HFA) 90 mcg/inh inhalation aerosol: 2 puff(s) inhaled 4 times a day as needed for  bronchospasm  amLODIPine 10 mg oral tablet: 1 tab(s) orally once a day  amlodipine-valsartan 5 mg-160 mg oral tablet: HOLD THIS MEDICATION UNTIL YOU SEE YOUR PRIMARY CARE PROVIDER   1 tab(s) orally once a day  apixaban 5 mg oral tablet: 1 tab(s) orally 2 times a day, HOLD, RESTART ON TUESDAY   atorvastatin 40 mg oral tablet: 1 tab(s) orally once a day (at bedtime)  clopidogrel 75 mg oral tablet: 1 tab(s) orally once a day  Eliquis 5 mg oral tablet: 1 tab(s) orally 2 times a day  Entresto 24 mg-26 mg oral tablet: 1 tab(s) orally 2 times a day  eplerenone 25 mg oral tablet: 1 tab(s) orally once a day  furosemide 40 mg oral tablet: 1 tab(s) orally once a day  gabapentin 300 mg oral capsule: 1 cap(s) orally 3 times a day  insulin detemir 100 units/mL subcutaneous solution: 32 unit(s) subcutaneous once a day in the morning and 36 units in the evening   Januvia 100 mg oral tablet: 1 tab(s) orally once a day  Lantus 100 units/mL subcutaneous solution: 30 subcutaneous 2 times a day  levothyroxine 150 mcg (0.15 mg) oral tablet: 1 tab(s) orally once a day  levothyroxine 150 mcg (0.15 mg) oral tablet: 1 tab(s) orally once a day  Lipitor 80 mg oral tablet: 1 tab(s) orally once a day  metFORMIN 500 mg oral tablet, extended release: 1 tab(s) orally 2 times a day  metoprolol succinate 25 mg oral tablet, extended release: 1 tab(s) orally once a day (at bedtime)   NovoLOG 100 units/mL subcutaneous solution: 5 unit(s) subcutaneous 3 times a day  novolog pre meal per sliding scale.:   tadalafil 5 mg oral tablet: 1 tab(s) orally once a day   acetaminophen 325 mg oral tablet: 2 tab(s) orally every 6 hours, As needed, Moderate Pain (4 - 6)  Albuterol (Eqv-Proventil HFA) 90 mcg/inh inhalation aerosol: 2 puff(s) inhaled 4 times a day as needed for  bronchospasm  aspirin 81 mg oral delayed release tablet: 1 tab(s) orally once a day  atorvastatin 40 mg oral tablet: 1 tab(s) orally once a day (at bedtime)  bumetanide 2 mg oral tablet: 1 tab(s) orally every 12 hours  clopidogrel 75 mg oral tablet: 1 tab(s) orally once a day  Eliquis 5 mg oral tablet: 1 tab(s) orally 2 times a day  eplerenone 25 mg oral tablet: 1 tab(s) orally once a day  gabapentin 300 mg oral capsule: 1 cap(s) orally 3 times a day  Januvia 100 mg oral tablet: 1 tab(s) orally once a day  Lantus 100 units/mL subcutaneous solution: 30 subcutaneous 2 times a day  levothyroxine 150 mcg (0.15 mg) oral tablet: 1 tab(s) orally once a day  metoprolol succinate 25 mg oral tablet, extended release: 1 tab(s) orally once a day (at bedtime)   NovoLOG 100 units/mL subcutaneous solution: 5 unit(s) subcutaneous 3 times a day  predniSONE 10 mg oral tablet: 1 tab(s) orally once a day 3 tab for 1day then 2 tab 1day then 1 tab one day  sacubitril-valsartan 49 mg-51 mg oral tablet: 1 tab(s) orally 2 times a day

## 2024-04-24 NOTE — CONSULT NOTE ADULT - ASSESSMENT
62 y/o Male who was under ER observation but now admitted to hospitalist team , pt. with acute chf with reduced EF. pt. 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 PAD s/p LE stent, right carotid endartectomy, small bowel AVM (no longer on plavix) who presented to the ED with complaints of leg swelling, shortness of breath, and orthopnea  Today ( 4/24)  S/P LHC/80% PLAD Lesion/ DESX1 TO pLAD    CKD III  Comorbidities as above   Volume status better   Watch on the current meds   Renal sonogram   UA , Urine P/C     Labs in am , will follow   
    62 y/o M with PMH of CAD s/p 1v CABG with MV repair, KOFI clip and MAZE (2003), s/p DARYA to ramus (4/2021), HFmrEF (LVEF 40-45%), AFib (on Eliquis), chronotropic incompetence, s/p dual chamber ICD (given suspected sarcoidosis with LV dysfunction, Dr. Aguirre at [place] 8/2021), VF s/p ICD shock, IDDM2 (A1C 8.5% 4/21/24), Grave's disease s/p radioactive iodine with subsequent hypothyroidism, untreated MAEGAN, COPD, PAD s/p leg stent and s/p right carotid endarterectomy, who presented to the ED with complaints of leg swelling, shortness of breath, and orthopnea.   Patient states that for the last month he has been experiencing worsening dyspnea on exertion and leg swelling. Patient was seen by Dr. Kirkland 04/03/24 and referred to [facility] for worsening symptoms, he was diagnosed with PNA there, and discharged home the following day. Patient states that ever since then his symptoms have gotten worse despite taking his lasix 40mg daily. Patient called Dr. Kirkland's office and increased his lasix to 60mg for the last 2 days, but when he had some chest pressure and dyspnea at rest he came to the ER to be evaluated.   -LVEF now 35-40% from 40-45%  4/19/24 TTE: LVEF 35-40%, LVIDd 5.1cm, mild LVH, mod RVE with mod RV dysfunction, mild MR, mod TR, mild OR, PASP 36 mmHg.  Patient currently with c/o OBRIEN with mild activity, denies HA, dizziness, CP, pnd  Acute kidney injury superimposed on CKD., Cr: 1.53, fluctuating around baseline  -Plan for R/LHC with CardioMEMS implant to prevent recurrent HF hospitalizations.    Risk Assessments:  ASA: 3  Mallampati: 2  Creat: 1.53  GFR: 51  BRA:1.3%      Indication:     Coronary Anatomy: as above    Plan:    -plan for R/LHC with cardiomems placement  -preferred access: RFA  -confirmed appropriate NPO duration  -ECG and Labs reviewed  -NS 0.9% 250ml/hr x 1 bolus; pre procedure SILVA ppx held in setting of HF, will reevaluate for post cath hydration based on rt heart pressures  -procedure discussed with patient; risks and benefits explained, questions answered    # LUPE ON CKD  Cr fluctuating around baseline now  Explained the patient regarding potential complications of iv contrast injection for cath, include, but not limited to Renal failure, potential need for HD, and pt verbalized undersatnding    -Discussed with DR Frey  -consent obtained by attending DR frey
HF: Dr. Kirkland  Primary Cards: Dr. Negrete  EP: Dr. Hutchinson     62 y/o M with PMH of CAD s/p 1v CABG with MV repair, KOFI clip and MAZE (2003), s/p DARYA to ramus (4/2021), HFmrEF (LVEF 40-45%), AFib (on Eliquis), chronotropic incompetence, s/p dual chamber ICD (given suspected sarcoidosis with LV dysfunction, Dr. Aguirre at [place] 8/2021), VF s/p ICD shock, IDDM2 (A1C 8.5% 4/21/24), Grave's disease s/p radioactive iodine with subsequent hypothyroidism, untreated MAEGAN, COPD, PAD s/p leg stent and s/p right carotid endarterectomy, who presented to the ED with complaints of leg swelling, shortness of breath, and orthopnea.     Patient states that for the last month he has been experiencing worsening dyspnea on exertion and leg swelling. Patient was seen by Dr. Kirkland 04/03/24 and referred to [facility] for worsening symptoms, he was diagnosed with PNA there, and discharged home the following day. Patient states that ever since then his symptoms have gotten worse despite taking his lasix 40mg daily. Patient called Dr. Kirkland's office and increased his lasix to 60mg for the last 2 days, but when he had some chest pressure and dyspnea at rest he came to the ER to be evaluated.     Cardiac Studies:  4/19/24 TTE: LVEF 35-40%, LVIDd 5.1cm, mild LVH, mod RVE with mod RV dysfunction, mild MR, mod TR, mild RI, PASP 36 mmHg.    3/7/23 TTE: LVEF 40-45%, LVIDd 5.38cm, mild LVH, mild MR, mild-mod TR, mod PH (PASP 52 mmHg).    4/22/21 LHC: LM normal, pLAD 40%, D1 95%, supplied by patent bypass graft, Cx minor luminal irregularities, RCA mild luminal irregularities, Mckay 80% s/p DARYA, Ao 162/70/87, LVEDP 30
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 Patient states that for the last month he has been experiencing worsening dyspnea on exertion and leg swelling. Patient was seen by Dr. Kirkland 04/03/24 and referred to [facility] for worsening symptoms, he was diagnosed with PNA there, and discharged home the following day. Patient states that ever since then his symptoms have gotten worse despite taking his lasix 40mg daily. Patient called Dr. Kirkland's office and increased his lasix to 60mg for the last 2 days, but when he also had some chest pressure and dyspnea at rest he came to the ER to be evaluated. Patient currently denies fevers, chills, CP, syncope, near syncope, N/V/D, headache, or dizziness.  Troponin 28  pBNP 1072

## 2024-04-25 ENCOUNTER — TRANSCRIPTION ENCOUNTER (OUTPATIENT)
Age: 64
End: 2024-04-25

## 2024-04-25 VITALS
HEART RATE: 70 BPM | DIASTOLIC BLOOD PRESSURE: 61 MMHG | SYSTOLIC BLOOD PRESSURE: 121 MMHG | TEMPERATURE: 98 F | RESPIRATION RATE: 18 BRPM | OXYGEN SATURATION: 96 %

## 2024-04-25 LAB
ANION GAP SERPL CALC-SCNC: 10 MMOL/L — SIGNIFICANT CHANGE UP (ref 5–17)
APPEARANCE UR: CLEAR — SIGNIFICANT CHANGE UP
BILIRUB UR-MCNC: NEGATIVE — SIGNIFICANT CHANGE UP
BUN SERPL-MCNC: 61.3 MG/DL — HIGH (ref 8–20)
CALCIUM SERPL-MCNC: 8.7 MG/DL — SIGNIFICANT CHANGE UP (ref 8.4–10.5)
CHLORIDE SERPL-SCNC: 96 MMOL/L — SIGNIFICANT CHANGE UP (ref 96–108)
CO2 SERPL-SCNC: 28 MMOL/L — SIGNIFICANT CHANGE UP (ref 22–29)
COLOR SPEC: YELLOW — SIGNIFICANT CHANGE UP
CREAT ?TM UR-MCNC: 26 MG/DL — SIGNIFICANT CHANGE UP
CREAT SERPL-MCNC: 1.41 MG/DL — HIGH (ref 0.5–1.3)
DIFF PNL FLD: NEGATIVE — SIGNIFICANT CHANGE UP
EGFR: 56 ML/MIN/1.73M2 — LOW
GLUCOSE BLDC GLUCOMTR-MCNC: 279 MG/DL — HIGH (ref 70–99)
GLUCOSE BLDC GLUCOMTR-MCNC: 319 MG/DL — HIGH (ref 70–99)
GLUCOSE BLDC GLUCOMTR-MCNC: 360 MG/DL — HIGH (ref 70–99)
GLUCOSE SERPL-MCNC: 329 MG/DL — HIGH (ref 70–99)
GLUCOSE UR QL: 500 MG/DL
HCT VFR BLD CALC: 37.7 % — LOW (ref 39–50)
HGB BLD-MCNC: 11.9 G/DL — LOW (ref 13–17)
KETONES UR-MCNC: NEGATIVE MG/DL — SIGNIFICANT CHANGE UP
LEUKOCYTE ESTERASE UR-ACNC: NEGATIVE — SIGNIFICANT CHANGE UP
MAGNESIUM SERPL-MCNC: 2.2 MG/DL — SIGNIFICANT CHANGE UP (ref 1.6–2.6)
MCHC RBC-ENTMCNC: 28.2 PG — SIGNIFICANT CHANGE UP (ref 27–34)
MCHC RBC-ENTMCNC: 31.6 GM/DL — LOW (ref 32–36)
MCV RBC AUTO: 89.3 FL — SIGNIFICANT CHANGE UP (ref 80–100)
NITRITE UR-MCNC: NEGATIVE — SIGNIFICANT CHANGE UP
PH UR: 6 — SIGNIFICANT CHANGE UP (ref 5–8)
PLATELET # BLD AUTO: 178 K/UL — SIGNIFICANT CHANGE UP (ref 150–400)
POTASSIUM SERPL-MCNC: 4.6 MMOL/L — SIGNIFICANT CHANGE UP (ref 3.5–5.3)
POTASSIUM SERPL-SCNC: 4.6 MMOL/L — SIGNIFICANT CHANGE UP (ref 3.5–5.3)
PROT ?TM UR-MCNC: <4 MG/DL — SIGNIFICANT CHANGE UP (ref 0–12)
PROT UR-MCNC: NEGATIVE MG/DL — SIGNIFICANT CHANGE UP
PROT/CREAT UR-RTO: <0.2 RATIO — SIGNIFICANT CHANGE UP
RBC # BLD: 4.22 M/UL — SIGNIFICANT CHANGE UP (ref 4.2–5.8)
RBC # FLD: 14.1 % — SIGNIFICANT CHANGE UP (ref 10.3–14.5)
SODIUM SERPL-SCNC: 134 MMOL/L — LOW (ref 135–145)
SP GR SPEC: 1.01 — SIGNIFICANT CHANGE UP (ref 1–1.03)
UROBILINOGEN FLD QL: 1 MG/DL — SIGNIFICANT CHANGE UP (ref 0.2–1)
WBC # BLD: 8.82 K/UL — SIGNIFICANT CHANGE UP (ref 3.8–10.5)
WBC # FLD AUTO: 8.82 K/UL — SIGNIFICANT CHANGE UP (ref 3.8–10.5)

## 2024-04-25 PROCEDURE — 85025 COMPLETE CBC W/AUTO DIFF WBC: CPT

## 2024-04-25 PROCEDURE — 99239 HOSP IP/OBS DSCHRG MGMT >30: CPT

## 2024-04-25 PROCEDURE — 76705 ECHO EXAM OF ABDOMEN: CPT

## 2024-04-25 PROCEDURE — 83880 ASSAY OF NATRIURETIC PEPTIDE: CPT

## 2024-04-25 PROCEDURE — C1753: CPT

## 2024-04-25 PROCEDURE — 82962 GLUCOSE BLOOD TEST: CPT

## 2024-04-25 PROCEDURE — 93306 TTE W/DOPPLER COMPLETE: CPT

## 2024-04-25 PROCEDURE — 76775 US EXAM ABDO BACK WALL LIM: CPT

## 2024-04-25 PROCEDURE — 71045 X-RAY EXAM CHEST 1 VIEW: CPT

## 2024-04-25 PROCEDURE — C1887: CPT

## 2024-04-25 PROCEDURE — 96374 THER/PROPH/DIAG INJ IV PUSH: CPT

## 2024-04-25 PROCEDURE — 92978 ENDOLUMINL IVUS OCT C 1ST: CPT | Mod: LD

## 2024-04-25 PROCEDURE — C1760: CPT

## 2024-04-25 PROCEDURE — 80048 BASIC METABOLIC PNL TOTAL CA: CPT

## 2024-04-25 PROCEDURE — 93005 ELECTROCARDIOGRAM TRACING: CPT

## 2024-04-25 PROCEDURE — 83735 ASSAY OF MAGNESIUM: CPT

## 2024-04-25 PROCEDURE — 85027 COMPLETE CBC AUTOMATED: CPT

## 2024-04-25 PROCEDURE — G0378: CPT

## 2024-04-25 PROCEDURE — C1725: CPT

## 2024-04-25 PROCEDURE — 36415 COLL VENOUS BLD VENIPUNCTURE: CPT

## 2024-04-25 PROCEDURE — 93010 ELECTROCARDIOGRAM REPORT: CPT

## 2024-04-25 PROCEDURE — 76775 US EXAM ABDO BACK WALL LIM: CPT | Mod: 26

## 2024-04-25 PROCEDURE — 94640 AIRWAY INHALATION TREATMENT: CPT

## 2024-04-25 PROCEDURE — 80053 COMPREHEN METABOLIC PANEL: CPT

## 2024-04-25 PROCEDURE — 86803 HEPATITIS C AB TEST: CPT

## 2024-04-25 PROCEDURE — 99232 SBSQ HOSP IP/OBS MODERATE 35: CPT

## 2024-04-25 PROCEDURE — 99285 EMERGENCY DEPT VISIT HI MDM: CPT | Mod: 25

## 2024-04-25 PROCEDURE — C1769: CPT

## 2024-04-25 PROCEDURE — 83036 HEMOGLOBIN GLYCOSYLATED A1C: CPT

## 2024-04-25 PROCEDURE — C1874: CPT

## 2024-04-25 PROCEDURE — C1894: CPT

## 2024-04-25 PROCEDURE — 81003 URINALYSIS AUTO W/O SCOPE: CPT

## 2024-04-25 PROCEDURE — 80061 LIPID PANEL: CPT

## 2024-04-25 PROCEDURE — 93459 L HRT ART/GRFT ANGIO: CPT | Mod: 59

## 2024-04-25 PROCEDURE — 84156 ASSAY OF PROTEIN URINE: CPT

## 2024-04-25 PROCEDURE — 84484 ASSAY OF TROPONIN QUANT: CPT

## 2024-04-25 PROCEDURE — C1889: CPT

## 2024-04-25 PROCEDURE — C9600: CPT | Mod: LD

## 2024-04-25 PROCEDURE — 82570 ASSAY OF URINE CREATININE: CPT

## 2024-04-25 PROCEDURE — 96376 TX/PRO/DX INJ SAME DRUG ADON: CPT

## 2024-04-25 RX ORDER — SACUBITRIL AND VALSARTAN 24; 26 MG/1; MG/1
1 TABLET, FILM COATED ORAL
Qty: 60 | Refills: 0
Start: 2024-04-25 | End: 2024-05-24

## 2024-04-25 RX ORDER — AMLODIPINE BESYLATE 2.5 MG/1
1 TABLET ORAL
Refills: 0 | DISCHARGE

## 2024-04-25 RX ORDER — ASPIRIN/CALCIUM CARB/MAGNESIUM 324 MG
1 TABLET ORAL
Qty: 30 | Refills: 0
Start: 2024-04-25 | End: 2024-05-24

## 2024-04-25 RX ORDER — ATORVASTATIN CALCIUM 80 MG/1
1 TABLET, FILM COATED ORAL
Qty: 0 | Refills: 0 | DISCHARGE
Start: 2024-04-25

## 2024-04-25 RX ORDER — AMLODIPINE AND VALSARTAN 5; 320 MG/1; MG/1
1 TABLET, FILM COATED ORAL
Qty: 0 | Refills: 0 | DISCHARGE

## 2024-04-25 RX ORDER — ATORVASTATIN CALCIUM 80 MG/1
1 TABLET, FILM COATED ORAL
Refills: 0 | DISCHARGE

## 2024-04-25 RX ORDER — BUMETANIDE 0.25 MG/ML
1 INJECTION INTRAMUSCULAR; INTRAVENOUS
Qty: 60 | Refills: 0
Start: 2024-04-25 | End: 2024-05-24

## 2024-04-25 RX ORDER — METFORMIN HYDROCHLORIDE 850 MG/1
1 TABLET ORAL
Qty: 0 | Refills: 0 | DISCHARGE

## 2024-04-25 RX ORDER — LEVOTHYROXINE SODIUM 125 MCG
1 TABLET ORAL
Qty: 0 | Refills: 0 | DISCHARGE

## 2024-04-25 RX ORDER — LEVOTHYROXINE SODIUM 125 MCG
1 TABLET ORAL
Qty: 0 | Refills: 0 | DISCHARGE
Start: 2024-04-25

## 2024-04-25 RX ORDER — INSULIN LISPRO 100/ML
8 VIAL (ML) SUBCUTANEOUS
Refills: 0 | Status: DISCONTINUED | OUTPATIENT
Start: 2024-04-25 | End: 2024-04-25

## 2024-04-25 RX ORDER — LEVOTHYROXINE SODIUM 125 MCG
1 TABLET ORAL
Refills: 0 | DISCHARGE

## 2024-04-25 RX ORDER — FUROSEMIDE 40 MG
1 TABLET ORAL
Qty: 0 | Refills: 0 | DISCHARGE

## 2024-04-25 RX ORDER — ATORVASTATIN CALCIUM 80 MG/1
1 TABLET, FILM COATED ORAL
Qty: 0 | Refills: 0 | DISCHARGE

## 2024-04-25 RX ORDER — CARVEDILOL PHOSPHATE 80 MG/1
3.12 CAPSULE, EXTENDED RELEASE ORAL EVERY 12 HOURS
Refills: 0 | Status: DISCONTINUED | OUTPATIENT
Start: 2024-04-25 | End: 2024-04-25

## 2024-04-25 RX ORDER — SACUBITRIL AND VALSARTAN 24; 26 MG/1; MG/1
1 TABLET, FILM COATED ORAL
Refills: 0 | DISCHARGE

## 2024-04-25 RX ORDER — TADALAFIL 10 MG/1
1 TABLET, FILM COATED ORAL
Qty: 0 | Refills: 0 | DISCHARGE

## 2024-04-25 RX ADMIN — Medication 25 MILLIGRAM(S): at 05:11

## 2024-04-25 RX ADMIN — SACUBITRIL AND VALSARTAN 1 TABLET(S): 24; 26 TABLET, FILM COATED ORAL at 05:13

## 2024-04-25 RX ADMIN — Medication 10: at 12:21

## 2024-04-25 RX ADMIN — Medication 40 MILLIGRAM(S): at 05:11

## 2024-04-25 RX ADMIN — Medication 8 UNIT(S): at 08:48

## 2024-04-25 RX ADMIN — Medication 150 MICROGRAM(S): at 05:11

## 2024-04-25 RX ADMIN — APIXABAN 5 MILLIGRAM(S): 2.5 TABLET, FILM COATED ORAL at 05:12

## 2024-04-25 RX ADMIN — Medication 8 UNIT(S): at 12:21

## 2024-04-25 RX ADMIN — Medication 8: at 09:19

## 2024-04-25 RX ADMIN — Medication 81 MILLIGRAM(S): at 13:54

## 2024-04-25 RX ADMIN — BUMETANIDE 2 MILLIGRAM(S): 0.25 INJECTION INTRAMUSCULAR; INTRAVENOUS at 05:12

## 2024-04-25 RX ADMIN — GABAPENTIN 300 MILLIGRAM(S): 400 CAPSULE ORAL at 13:54

## 2024-04-25 RX ADMIN — CLOPIDOGREL BISULFATE 75 MILLIGRAM(S): 75 TABLET, FILM COATED ORAL at 13:54

## 2024-04-25 RX ADMIN — GABAPENTIN 300 MILLIGRAM(S): 400 CAPSULE ORAL at 05:11

## 2024-04-25 NOTE — PROGRESS NOTE ADULT - PROBLEM SELECTOR PROBLEM 2
CAD (coronary artery disease)
Chronic atrial fibrillation
Chronic atrial fibrillation
CAD (coronary artery disease)

## 2024-04-25 NOTE — PROGRESS NOTE ADULT - PROBLEM/PLAN-3
Satisfactory
DISPLAY PLAN FREE TEXT

## 2024-04-25 NOTE — PROGRESS NOTE ADULT - PROBLEM SELECTOR PLAN 4
-Currently in SR  -c/w BB as above  - c/w eliquis
-Currently in SR  -c/w BB as above  -AC: eliquis
-Currently in SR  -c/w BB as above  -Will hold his AC in anticipation for cath, possibly tomorrow.

## 2024-04-25 NOTE — PROGRESS NOTE ADULT - SUBJECTIVE AND OBJECTIVE BOX
NEPHROLOGY INTERVAL HPI/OVERNIGHT EVENTS:    Feels well   No events   UA bland   Cardio follow up noted     MEDICATIONS  (STANDING):  albuterol    0.083% 2.5 milliGRAM(s) Nebulizer every 6 hours  albuterol    90 MICROgram(s) HFA Inhaler 1 Puff(s) Inhalation every 4 hours  apixaban 5 milliGRAM(s) Oral two times a day  aspirin enteric coated 81 milliGRAM(s) Oral daily  atorvastatin 40 milliGRAM(s) Oral at bedtime  buMETAnide 2 milliGRAM(s) Oral every 12 hours  carvedilol 3.125 milliGRAM(s) Oral every 12 hours  clopidogrel Tablet 75 milliGRAM(s) Oral daily  dextrose 10% Bolus 125 milliLiter(s) IV Bolus once  dextrose 5%. 1000 milliLiter(s) (50 mL/Hr) IV Continuous <Continuous>  dextrose 5%. 1000 milliLiter(s) (100 mL/Hr) IV Continuous <Continuous>  dextrose 50% Injectable 25 Gram(s) IV Push once  dextrose 50% Injectable 12.5 Gram(s) IV Push once  gabapentin 300 milliGRAM(s) Oral every 8 hours  glucagon  Injectable 1 milliGRAM(s) IntraMuscular once  insulin glargine Injectable (LANTUS) 30 Unit(s) SubCutaneous at bedtime  insulin lispro (ADMELOG) corrective regimen sliding scale   SubCutaneous at bedtime  insulin lispro (ADMELOG) corrective regimen sliding scale   SubCutaneous three times a day before meals  insulin lispro Injectable (ADMELOG) 8 Unit(s) SubCutaneous three times a day before meals  levothyroxine 150 MICROGram(s) Oral daily  sacubitril 49 mG/valsartan 51 mG 1 Tablet(s) Oral two times a day    MEDICATIONS  (PRN):  albuterol    0.083% 2.5 milliGRAM(s) Nebulizer every 4 hours PRN Shortness of Breath and/or Wheezing  dextrose Oral Gel 15 Gram(s) Oral once PRN Blood Glucose LESS THAN 70 milliGRAM(s)/deciliter      Allergies    lisinopril (Unknown)  penicillin (Unknown)  penicillins (Hives)    Intolerances                Vital Signs Last 24 Hrs  T(C): 36.4 (2024 07:44), Max: 36.8 (2024 17:40)  T(F): 97.6 (2024 07:44), Max: 98.2 (2024 17:40)  HR: 80 (2024 07:44) (64 - 99)  BP: 149/77 (2024 07:44) (99/53 - 157/70)  BP(mean): --  RR: 17 (2024 07:44) (10 - 18)  SpO2: 97% (2024 07:44) (95% - 98%)    Parameters below as of 2024 08:00  Patient On (Oxygen Delivery Method): room air      Daily     Daily   I&O's Detail    2024 07:01  -  2024 07:00  --------------------------------------------------------  IN:  Total IN: 0 mL    OUT:    Voided (mL): 400 mL  Total OUT: 400 mL    Total NET: -400 mL        I&O's Summary    2024 07:01  -  2024 07:00  --------------------------------------------------------  IN: 0 mL / OUT: 400 mL / NET: -400 mL        PHYSICAL EXAM:    GENERAL: NAD, laying flat   HEAD:  Atraumatic, Normocephalic  NECK: Supple, No JVD, Normal thyroid  NERVOUS SYSTEM:  Alert & Oriented X3,   CHEST/LUNG: Clear / EAE . No wheeze   HEART: Regular rate and rhythm; No murmurs, rubs, or gallops  ABDOMEN: Soft, Nontender, Nondistended; Bowel sounds present  EXTREMITIES:  Min edema   LABS:                        11.9   8.82  )-----------( 178      ( 2024 05:00 )             37.7     04-25    134<L>  |  96  |  61.3<H>  ----------------------------<  329<H>  4.6   |  28.0  |  1.41<H>    Ca    8.7      2024 05:00  Mg     2.2             Urinalysis Basic - ( 2024 09:53 )    Color: Yellow / Appearance: Clear / S.011 / pH: x  Gluc: x / Ketone: Negative mg/dL  / Bili: Negative / Urobili: 1.0 mg/dL   Blood: x / Protein: Negative mg/dL / Nitrite: Negative   Leuk Esterase: Negative / RBC: x / WBC x   Sq Epi: x / Non Sq Epi: x / Bacteria: x      Magnesium: 2.2 mg/dL ( @ 05:00)          < from: US Renal (24 @ 10:44) >    ACC: [id number] EXAM:  US KIDNEY(S)   ORDERED BY: BRITT KRAUSE DATE:  2024          INTERPRETATION:  CLINICAL INFORMATION: Renal failure    COMPARISON: None available.    TECHNIQUE: Sonography of the kidneys and bladder.    FINDINGS:  Right kidney: 10.9 cm. No renal mass, hydronephrosis or calculi.    Left kidney: 12.7 cm. No renal mass, hydronephrosis or calculi.    Urinary bladder: Empty    IMPRESSION:  No hydronephrosis bilaterally        --- End of Report ---            DEVANG WALSH MD; Attending Radiologist  Thi    < end of copied text >  RADIOLOGY & ADDITIONAL TESTS:

## 2024-04-25 NOTE — PROGRESS NOTE ADULT - PROBLEM SELECTOR PLAN 2
- Hx of CAD s/p CABG x 1 (Graft to D1) and PCI with DARYA x 1 to pRamus 2021.   - Not on aspirin due to GI bleed but questionable if still on Eliquis.   - Records showed on plavix as of 03/23?  - Will need records from primary Cardiologist Dr. Brandi Perez on 04/22/24.   - Continue Entresto 24/26 mg BID PO.   - Recommend check fasting lipid panel, not on statin.   - Repeat TTE 4/19: EF 35-40%, mild LVH, moderately enlarged RV size and moderately reduced RV function. Mod TR. PASP 36.    - Ischemic evaluation on Monday pending aforementioned workup
-H/o CABG  -S/p [facility] yesterday which showed a 70% prox LAD lesion which was stented  -He will go out on aspirin, plavix and eliquis. After 1 month he we will stop the aspirin.  -C/w statin, BB.
-s/p CABG, last angiogram was 4/2021 but given worsening symptoms and slight decline in LVEF will repeat [facility]  -He was taken off antiplatelet therapy following admission for GIB at [place].  -C/w statinDENY.
- Hx of CAD s/p CABG x 1 (Graft to D1) and PCI with DARYA x 1 to pRamus 2021.   - taken off antiplatelet therapy following admission for GIB at [place].  - Continue Entresto 24/26 mg BID PO.   - c/w atorvastatin 40 mg PO daily.   - Repeat TTE 4/19: EF 35-40%, mild LVH, moderately enlarged RV size and moderately reduced RV function. Mod TR. PASP 36.    - Heart failure to follow tomorrow.
-s/p CABG, last angiogram was 4/2021 but given worsening symptoms and slight decline in LVEF will repeat LHC  - He had a LHC today which showed a 70% prox LAD lesion which was stented  - he will go out on aspirin, plavix and eliquis. After 1 month he we will stop the aspirin  -C/w statin, BB.

## 2024-04-25 NOTE — PROGRESS NOTE ADULT - ASSESSMENT
HF: Dr. Kirkland  Primary Cards: Dr. Negrete  EP: Dr. Hutchinson     64 y/o M with PMH of CAD s/p 1v CABG with MV repair, KOFI clip and MAZE (2003), s/p DARYA to ramus (4/2021), HFmrEF (LVEF 40-45%), AFib (on Eliquis), chronotropic incompetence, s/p dual chamber ICD (given suspected sarcoidosis with LV dysfunction, Dr. Aguirre at [place] 8/2021), VF s/p ICD shock, IDDM2 (A1C 8.5% 4/21/24), Grave's disease s/p radioactive iodine with subsequent hypothyroidism, untreated MAEGAN, COPD, PAD s/p leg stent and s/p right carotid endarterectomy, who presented to the ED with complaints of leg swelling, shortness of breath, and orthopnea.     Patient states that for the last month he has been experiencing worsening dyspnea on exertion and leg swelling. Patient was seen by Dr. Kirkland 04/03/24 and referred to [facility] for worsening symptoms, he was diagnosed with PNA there, and discharged home the following day. Patient states that ever since then his symptoms have gotten worse despite taking his lasix 40mg daily. Patient called Dr. Kirkland's office and increased his lasix to 60mg for the last 2 days, but when he had some chest pressure and dyspnea at rest he came to the ER to be evaluated.     He underwent LHC 4/24/24 with PCI and DARYA placement to the pLAD. LVEDP 40 (Full report pending).    Cardiac Studies:  4/19/24 TTE: LVEF 35-40%, LVIDd 5.1cm, mild LVH, mod RVE with mod RV dysfunction, mild MR, mod TR, mild RI, PASP 36 mmHg.    3/7/23 TTE: LVEF 40-45%, LVIDd 5.38cm, mild LVH, mild MR, mild-mod TR, mod PH (PASP 52 mmHg).    4/22/21 LHC: LM normal, pLAD 40%, D1 95%, supplied by patent bypass graft, Cx minor luminal irregularities, RCA mild luminal irregularities, Mckay 80% s/p DARYA, Ao 162/70/87, LVEDP 30

## 2024-04-25 NOTE — PROGRESS NOTE ADULT - ASSESSMENT
64 y/o Male who was under ER observation but now admitted to hospitalist team , pt. with acute chf with reduced EF. pt. 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 PAD s/p LE stent, right carotid endartectomy, small bowel AVM (no longer on plavix) who presented to the ED with complaints of leg swelling, shortness of breath, and orthopnea  Today ( 4/24)  S/P LHC/80% PLAD Lesion/ DESX1 TO pLAD    CKD III - stable renal function post cardiac intervention 4/24   Comorbidities as above   Volume status better   Watch on the current meds   Renal sonogram normal today   UA  bland     Cleared from a renal perspective for discharge   Will follow up as an outpatient

## 2024-04-25 NOTE — PROGRESS NOTE ADULT - PROBLEM SELECTOR PROBLEM 3
Acute kidney injury superimposed on CKD
Atrial fibrillation
CAD (coronary artery disease)
Acute kidney injury superimposed on CKD
Atrial fibrillation
CAD (coronary artery disease)
Acute kidney injury superimposed on CKD

## 2024-04-25 NOTE — DISCUSSION/SUMMARY
"  Ayla Dela Cruz presented for a  Medicare AWV and comprehensive Health Risk Assessment today. The following components were reviewed and updated:    Medical history  Family History  Social history  Allergies and Current Medications  Health Risk Assessment  Health Maintenance  Care Team         ** See Completed Assessments for Annual Wellness Visit within the encounter summary.**         The following assessments were completed:  Living Situation  CAGE  Depression Screening  Timed Get Up and Go  Whisper Test  Cognitive Function Screening  Nutrition Screening  ADL Screening  PAQ Screening    Clock in media   Opioid documentation:      Patient does not have a current opioid prescription.        Vitals:    04/25/24 1420   BP: (!) 107/55   Pulse: 71   Temp: 97.3 °F (36.3 °C)   TempSrc: Oral   SpO2: 99%   Weight: 51.4 kg (113 lb 5.1 oz)   Height: 5' 7" (1.702 m)     Body mass index is 17.75 kg/m².  Physical Exam  Constitutional:       Appearance: She is well-developed.   HENT:      Head: Normocephalic and atraumatic.      Nose: Nose normal.   Eyes:      General: Lids are normal.      Conjunctiva/sclera: Conjunctivae normal.   Cardiovascular:      Rate and Rhythm: Normal rate.   Pulmonary:      Effort: Pulmonary effort is normal.   Neurological:      Mental Status: She is alert and oriented to person, place, and time.   Psychiatric:         Speech: Speech normal.         Behavior: Behavior normal.               Diagnoses and health risks identified today and associated recommendations/orders:    1. Encounter for preventive health examination  Discussed health maintenance guidelines appropriate for age.        2. Primary hypertension  Controlled, continue current medication regimen  Low salt diet  Increase physical activity  Followed by pcp      3. Chronic diastolic heart failure, 2014  Stable, continue current medications   Followed by cardiology     4. Atherosclerosis of aorta  Stable, continue to monitor   Followed by " pcp     5. Underweight  Stable, continue to monitor       Provided Ayla with a 5-10 year written screening schedule and personal prevention plan. Recommendations were developed using the USPSTF age appropriate recommendations. Education, counseling, and referrals were provided as needed. After Visit Summary printed and given to patient which includes a list of additional screenings\tests needed.    Follow up for One year for Annual Wellness Visit.    Melba Ayers, NP      I offered to discuss advanced care planning, including how to pick a person who would make decisions for you if you were unable to make them for yourself, called a health care power of , and what kind of decisions you might make such as use of life sustaining treatments such as ventilators and tube feeding when faced with a life limiting illness recorded on a living will that they will need to know. (How you want to be cared for as you near the end of your natural life)     X  Patient has advanced directives on file, which we reviewed, and they do not wish to make changes.   [Patient] : the patient [FreeTextEntry1] : Mr. Jimenez is a 60 y/o M with NICM, LVEF 45%, CAD s/p 1v CABG with subsequent PCI, AFib, CKD stage 2, DM, PAD and hypertension who is overall doing well since implantation of dual chamber ICD. He is ACC/AHA Stage C, NYHA Class II HF, euvolemic on exam. I have recommended the following:\par \par 1. NICM - Stable and well compensated. Given patchy scarring noted on cardiac MRI will arrange for a PET/CT with Dr. Vidhya Cedillo at [facility] to evaluate for sarcoidosis. Will check labs today to reassess renal function and potassium with plan to change amlodipine-valsartan to Entresto given moderate systolic dysfunction. Additionally will discuss with Dr. Najera his endocrinologist resuming SGLT2i with reduction in loop diuretics to lasix 20mg daily. Will continue eplerenone 50mg daily. He's enrolled in TRANSFORM and ancillary clinical trials. I have ordered an abdominal ultrasound to assess for ascites.\par \par 2. CAD s/p CABG - Stable without s/s of ischemia. He was taken off antiplatelet therapy following admission for GIB at [place]. Should be resumed on beta blocker now s/p dual chamber ICD. Continue statin.\par \par 3. CKD II- Most recent labs in August show Cr 1.7 from 1.2-1.3 in April. Will reassess with labs today. Will discuss SGLT2i as above which may slow progression of CKD.\par \par 4. PAD s/p LE stent - Notes LE pain with ambulation. Recommended follow up with vascular for further evaluation.\par \par 5. HTN - Well controlled on current dose of valsartan-amlodipine. Plan to transition to Entresto as noted above.\par \par 6. DM2 - Not ideally controlled with A1C 8.2%. Follow up with Dr. Najera.\par \par 7. Follow up with Telehealth visit with nurse practitioner every 2 weeks and follow up with Dr. Castañeda in January 2022.\par

## 2024-04-25 NOTE — PROGRESS NOTE ADULT - SUBJECTIVE AND OBJECTIVE BOX
Pt had episode of aspiration this afternoon,  Will keep NPO    check CXR and aspiration precautions   Cont rocephin [facility] Advanced Heart Failure - Progress Note  [address] [place] [address]  Office Phone: [phone]/Fax: [phone]  Service/On Call Phone [phone]    Subjective/Objective: Pt. reports improvement in SOB, ambulated in hallway without significant symptoms.     Medications:  albuterol    0.083% 2.5 milliGRAM(s) Nebulizer every 4 hours PRN  albuterol    0.083% 2.5 milliGRAM(s) Nebulizer every 6 hours  albuterol    90 MICROgram(s) HFA Inhaler 1 Puff(s) Inhalation every 4 hours  apixaban 5 milliGRAM(s) Oral two times a day  aspirin enteric coated 81 milliGRAM(s) Oral daily  atorvastatin 40 milliGRAM(s) Oral at bedtime  buMETAnide 2 milliGRAM(s) Oral every 12 hours  carvedilol 3.125 milliGRAM(s) Oral every 12 hours  clopidogrel Tablet 75 milliGRAM(s) Oral daily  dextrose 10% Bolus 125 milliLiter(s) IV Bolus once  dextrose 5%. 1000 milliLiter(s) IV Continuous <Continuous>  dextrose 5%. 1000 milliLiter(s) IV Continuous <Continuous>  dextrose 50% Injectable 12.5 Gram(s) IV Push once  dextrose 50% Injectable 25 Gram(s) IV Push once  dextrose Oral Gel 15 Gram(s) Oral once PRN  gabapentin 300 milliGRAM(s) Oral every 8 hours  glucagon  Injectable 1 milliGRAM(s) IntraMuscular once  insulin glargine Injectable (LANTUS) 30 Unit(s) SubCutaneous at bedtime  insulin lispro (ADMELOG) corrective regimen sliding scale   SubCutaneous at bedtime  insulin lispro (ADMELOG) corrective regimen sliding scale   SubCutaneous three times a day before meals  insulin lispro Injectable (ADMELOG) 8 Unit(s) SubCutaneous three times a day before meals  levothyroxine 150 MICROGram(s) Oral daily  sacubitril 49 mG/valsartan 51 mG 1 Tablet(s) Oral two times a day    Vital Signs Last 24 Hours  T(C): 36.4 (04-25-24 @ 07:44), Max: 36.8 (04-24-24 @ 17:40)  HR: 80 (04-25-24 @ 07:44) (64 - 99)  BP: 149/77 (04-25-24 @ 07:44) (99/53 - 157/70)  RR: 17 (04-25-24 @ 07:44) (10 - 18)  SpO2: 97% (04-25-24 @ 07:44) (95% - 98%)    I&O's Summary  24 Apr 2024 07:01  -  25 Apr 2024 07:00  --------------------------------------------------------  IN: 0 mL / OUT: 400 mL / NET: -400 mL    Tele: SR, PVCs    Physical Exam:  General: In no distress.   HEENT: EOMs intact.  Neck: Neck supple. JVP not elevated.   Chest: Clear to auscultation bilaterally.  CV: RRR. Normal S1 and S2. No murmurs, rub, or gallops. Warm peripherally.  PV: No LE edema noted. Venous stasis discoloration noted bilaterally.  Abdomen: Softly distended.  Skin: warm, dry.  Neurology: Alert and oriented times three. Sensation intact.  Psych: Appropriate affect.    Labs:                        11.9   8.82  )-----------( 178      ( 25 Apr 2024 05:00 )             37.7     04-25    134<L>  |  96  |  61.3<H>  ----------------------------<  329<H>  4.6   |  28.0  |  1.41<H>    Ca    8.7      25 Apr 2024 05:00  Mg     2.2     04-25

## 2024-04-25 NOTE — PROGRESS NOTE ADULT - PROBLEM SELECTOR PROBLEM 1
CHF exacerbation
Acute HFrEF (heart failure with reduced ejection fraction)
CHF exacerbation
Acute HFrEF (heart failure with reduced ejection fraction)
CHF exacerbation

## 2024-04-25 NOTE — DISCHARGE NOTE NURSING/CASE MANAGEMENT/SOCIAL WORK - PATIENT PORTAL LINK FT
You can access the FollowMyHealth Patient Portal offered by [facility] by registering at the following website: [email or web address]. By joining [employer]’s FollowMyHealth portal, you will also be able to view your health information using other applications (apps) compatible with our system.

## 2024-04-25 NOTE — PROGRESS NOTE ADULT - NS ATTEND AMEND GEN_ALL_CORE FT
Patient seen and examined by me.    T(C): 36.3 (04-20-24 @ 19:41), Max: 36.9 (04-20-24 @ 05:30)  HR: 82 (04-20-24 @ 19:41) (67 - 82)  BP: 167/94 (04-20-24 @ 19:41) (119/68 - 167/94)  RR: 19 (04-20-24 @ 19:41) (18 - 94)  SpO2: 93% (04-20-24 @ 19:41) (93% - 97%)  Patient alert and awake.  Chest- Bilateral Clear BS  Cardiac- S1 and S2  Abdomen- Soft    Assessment/Plan:  1. CAD  2. CHF/Afib  I have discussed my recommendation with the PA which are outlined above.  Will follow.
Patient seen and examined by me.    Vital Signs Last 24 Hrs  T(C): 36.8 (04-21-24 @ 16:07), Max: 36.8 (04-21-24 @ 16:07)  T(F): 98.2 (04-21-24 @ 16:07), Max: 98.2 (04-21-24 @ 16:07)  HR: 64 (04-21-24 @ 17:07) (64 - 82)  BP: 116/54 (04-21-24 @ 17:07) (114/59 - 167/94)  BP(mean): 104 (04-20-24 @ 22:05) (77 - 104)  RR: 16 (04-21-24 @ 16:07) (16 - 19)  SpO2: 90% (04-21-24 @ 16:07) (90% - 94%)  Patient alert and awake.  Chest- Bilateral Clear BS  Cardiac- S1 and S2  Abdomen- Soft    Assessment/Plan:  1. CAD  2. CHF/Afib  I have discussed my recommendation with the PA which are outlined above.    Patient to be followed by Heart failure team on Monday
Had a stent placed in the prox LAD  Switch Toprol to Coreg 3.125 mg BID. Continue Entresto 49/51mg BID. Can plan to resume eplerenone as outpt if hyperkalemia improves. He did not tolerate SGLT2i as an outpatient.   Bumex 2mg BID PO BID   Can be discharged home today with followup in the CHF office

## 2024-04-25 NOTE — PROGRESS NOTE ADULT - PROBLEM SELECTOR PROBLEM 4
DM (diabetes mellitus)
Paroxysmal atrial fibrillation
Paroxysmal atrial fibrillation
DM (diabetes mellitus)
Paroxysmal atrial fibrillation

## 2024-04-25 NOTE — PROGRESS NOTE ADULT - PROBLEM SELECTOR PLAN 3
-Recent baseline Cr ~1.4-1.59 - stable  -Monitor renal function closely with diuresis  -Avoid nephrotoxins
-Recent baseline Cr ~1.4-1.59, no improved  -Monitor renal function closely with diuresis  -Avoid nephrotoxins.
- Currently rate controlled on telemetry   - Resume Toprol XL 25 mg po daily for rate control.   - [id number] 3   - Continue Eliquis 5mg PO BID.   - If any procedures are needed while in the hospital, can anticoagulate with lovenox or heparin gtt.   - Continue telemetry monitoring.
- Currently rate controlled on telemetry   - c/w Toprol XL 25 mg po daily for rate control.   - [id number] 3   - Continue Eliquis 5mg PO BID.   - If any procedures are needed while in the hospital, can anticoagulate with lovenox or heparin gtt.   - Continue telemetry monitoring.
-Recent baseline Cr ~1.4-1.59, no improved  -Monitor renal function closely with diuresis  -Avoid nephrotoxins.

## 2024-04-25 NOTE — PROGRESS NOTE ADULT - PROBLEM SELECTOR PLAN 1
- Acutely decompensated. Volume overloaded with warm extremities.   - Echo 03/23 with EF 40-45%, mild LVH, mild MR, mild to mod TR, moderate pHTN  - Repeat TTE 4/19: EF 35-40%, mild LVH, moderately enlarged RV size and moderately reduced RV function. Mod TR. PASP 36.   - GDMT: diurese with lasix 40 mg IV push BID, c/w entresto 24/26 mg BID PO.   - was on eplerenone at home. Can restart once d/c.  - May restart Toprol XL 25 mg PO daily.    - Monitor on telemetry.    - Strict i/o and daily weights.    - Keep K > 4, Mg > 2.   - Monitor renal function with ongoing diuresis.
-LVEF now 35-40% from 40-45%  -There was a concern for sarcoid on cardiac MRI and h/o VT. Will reorder PET as an outpatient.  -GDMT: Continue Toprol-XL 25mg nightly and entresto 49/51mg BID. Can resume home eplerenone on discharge tomorrow. He did not tolerate SGLT2i as an outpatient  -Diuretics: Start bumex 2mg BID PO BID starting tomorrow  -Device: s/p ICD, followed by Dr. Hutchinson  -Advanced Therapies: Will plan for CPET for risk stratification once GDMT optimized  -MAEGAN: Pt states he has a mask but does not use it. Educated regarding importance of sleep apnea treatment. Referred back to Pulmonology for f/u.  -Social History: Is  from his wife, denies smoking, social alcohol use. Smokes marijuana for sleep.
- Acutely decompensated. Volume overloaded with warm extremities.   - Echo 03/23 with EF 40-45%, mild LVH, mild MR, mild to mod TR, moderate pHTN  - Repeat TTE 4/19: EF 35-40%, mild LVH, moderately enlarged RV size and moderately reduced RV function. Mod TR. PASP 36.  - Heart failure to follow tomorrow.   - GDMT: diurese with lasix 40 mg IV push BID, c/w Entresto 24/26 mg BID PO and metoprolol xl 25 mg po daily.   - was on eplerenone at home. Can restart once d/c.  - Monitor on telemetry.    - Strict i/o and daily weights.    - Keep K > 4, Mg > 2.   - Monitor renal function with ongoing diuresis.
-LVEF now 35-40% from 40-45%  -There was a concern for sarcoid on cardiac MRI and h/o VT. Will reorder PET as an outpatient.  -GDMT: Switch Toprol to Coreg 3.125 mg BID. Continue Entresto 49/51mg BID. Can plan to resume eplerenone as outpt if hyperkalemia improves. He did not tolerate SGLT2i as an outpatient.  -Diuretics: Bumex 2mg BID PO BID   -Device: s/p ICD, followed by Dr. Hutchinson  -Will consider RHC/CardioMEMS placement as outpt   -Advanced Therapies: Will plan for CPET for risk stratification once GDMT optimized  -MAEGAN: Pt states he has a mask but does not use it. Educated regarding importance of sleep apnea treatment. Referred back to Pulmonology for f/u.  -Social History: Is  from his wife, denies smoking, social alcohol use. Smokes marijuana for sleep.  -DC planning underway. Pt should f/u in our [place] office next week-will call to schedule.
-LVEF now 35-40% from 40-45%  -There was a concern for sarcoid on cardiac MRI and h/o VT. Will reorder PET as an outpatient.  -GDMT: Continue Toprol-XL 25mg nightly (also on Eplerenone 25mg daily at home). Increase entresto to 49/51mg BID  -Diuretics: Continue Lasix 40 mg IV BID.  -Device: s/p ICD, followed by Dr. Hutchinson  -Advanced Therapies: Will plan for CPET for risk stratification once GDMT optimized  -MAEGAN: Pt states he has a mask but does not use it. Educated regarding importance of sleep apnea treatment. Referred back to Pulmonology for f/u.  -Social History: Is  from his wife, denies smoking, social alcohol use. Smokes marijuana for sleep.  -Abd US to r/o ascites  -Plan for R/LHC with CardioMEMS implant to prevent recurrent HF hospitalizations.

## 2024-04-26 ENCOUNTER — APPOINTMENT (OUTPATIENT)
Dept: HEART FAILURE | Facility: CLINIC | Age: 64
End: 2024-04-26

## 2024-05-02 ENCOUNTER — APPOINTMENT (OUTPATIENT)
Dept: HEART AND VASCULAR | Facility: CLINIC | Age: 64
End: 2024-05-02
Payer: MEDICAID

## 2024-05-02 VITALS
DIASTOLIC BLOOD PRESSURE: 73 MMHG | WEIGHT: 221 LBS | BODY MASS INDEX: 28.36 KG/M2 | HEART RATE: 81 BPM | SYSTOLIC BLOOD PRESSURE: 147 MMHG | OXYGEN SATURATION: 96 % | HEIGHT: 74 IN

## 2024-05-02 PROCEDURE — G2211 COMPLEX E/M VISIT ADD ON: CPT | Mod: NC,1L

## 2024-05-02 PROCEDURE — 99214 OFFICE O/P EST MOD 30 MIN: CPT

## 2024-05-02 RX ORDER — CLOPIDOGREL BISULFATE 75 MG/1
75 TABLET, FILM COATED ORAL DAILY
Qty: 90 | Refills: 3 | Status: ACTIVE | COMMUNITY
Start: 2024-05-02 | End: 1900-01-01

## 2024-05-02 RX ORDER — SACUBITRIL AND VALSARTAN 97; 103 MG/1; MG/1
97-103 TABLET, FILM COATED ORAL TWICE DAILY
Qty: 180 | Refills: 3 | Status: ACTIVE | COMMUNITY
Start: 2024-05-02 | End: 1900-01-01

## 2024-05-02 NOTE — PHYSICAL EXAM
[Well Developed] : well developed [Well Nourished] : well nourished [No Acute Distress] : no acute distress [Normal S1, S2] : normal S1, S2 [Clear Lung Fields] : clear lung fields [No Edema] : no edema [No Cyanosis] : no cyanosis [de-identified] : chronic PAD changes

## 2024-05-02 NOTE — DISCUSSION/SUMMARY
[Systolic Heart Failure] : systolic heart failure [Stable] : stable [NYHA Class III] : NYHA Class III [Ischemic Heart Disease] : ischemic heart disease [Outpatient Evaluation] : outpatient evaluation [Basic Metabolic Panel] : basic metabolic panel [BNP] : B-type natriuretic peptide [Medication Changes Per Orders] : Medication changes are as documented in orders [Sodium Restriction] : sodium restriction [Patient] : the patient [Risks] : risks [With Me] : with me [___ Month(s)] : in [unfilled] month(s) [de-identified] : refer to cardiac rehab

## 2024-05-02 NOTE — HISTORY OF PRESENT ILLNESS
[FreeTextEntry1] : 64 y/o M with PMH of CAD s/p 1v CABG with MV repair, KOFI clip and MAZE (2003), s/p DARYA to ramus (4/2021), HFmrEF (LVEF 40-45%), AFib (on Eliquis), chronotropic incompetence, s/p dual chamber ICD (given suspected sarcoidosis with LV dysfunction, Dr. Aguirre at [place] 8/2021), VF s/p ICD shock, who presents today for follow-up of his HF after recent hospitalization. His other comorbidities include IDDM2, Grave's disease s/p radioactive iodine with subsequent hypothyroidism, untreated MAEGAN, COPD, PAD s/p leg stent and s/p right carotid endarterectomy.  He was recently hospitalized from [facility] then admitted for [facility]. He underwent PCI to LAD. Since hospitalization he is feeling much better.  He can walk further.  He can walk 2 blocks, prior to hospitalization, he could walk 1/4 block.   Denies CP, palpitations, dizziness, edema.   + dry cough x Jan, improving.   He underwent EP study which did not show AFib but noted extensive scarring and chronotropic incompetence. Cardiac MRI done in 8/2021 showed biventricular systolic dysfunction with patchy epicardial and endocardial scar, concerning for sarcoidosis. Given above findings he underwent placement of dual chamber ICD in 8/2021.  Coronary Angiography 4/24 Left main artery: Angiography shows no disease. KAIA Flow 3.   Left anterior descending artery: Angiography shows severe atherosclerosis. 80% focal obstructive lesion in the proximal LAD. Stented to 0%. .   Circumflex: Angiography shows moderate atherosclerosis.   Right coronary artery: Moderate atherosclerosis in the distal RCA. .   Graft Angiography LIMA graft to First diagonal: patent.    Echo 4/24  1. Septal motion is abnormal consistent with previous cardiac surgery. Left ventricular systolic function is moderately decreased with an ejection fraction visually estimated at 35 to 40 %.  2. Mild left ventricular hypertrophy.  3. The left ventricular diastolic function is indeterminate, with indeterminant filling pressure.  4. Moderately enlarged right ventricular cavity size and moderately reduced systolic function.  5. Estimated pulmonary artery systolic pressure is 36 mmHg.  6. Moderate tricuspid regurgitation.  7. Mitral annuloplasty ring present with mild residual regurgitation.  8. Mild pulmonic regurgitation.  9. The left atrium is normal in size. 10. The right atrium is mildly dilated. 11. No pericardial effusion seen. 12. Compared to the transthoracic echocardiogram performed on 3/2023, prior LV EF reportedly of 40-45%.

## 2024-05-03 PROBLEM — I48.0 PAROXYSMAL ATRIAL FIBRILLATION: Chronic | Status: ACTIVE | Noted: 2024-04-21

## 2024-05-03 PROBLEM — E03.9 HYPOTHYROIDISM, UNSPECIFIED: Chronic | Status: ACTIVE | Noted: 2024-04-21

## 2024-05-03 PROBLEM — I25.10 ATHEROSCLEROTIC HEART DISEASE OF NATIVE CORONARY ARTERY WITHOUT ANGINA PECTORIS: Chronic | Status: ACTIVE | Noted: 2024-04-21

## 2024-05-03 PROBLEM — E11.9 TYPE 2 DIABETES MELLITUS WITHOUT COMPLICATIONS: Chronic | Status: ACTIVE | Noted: 2024-04-21

## 2024-05-03 PROBLEM — I73.9 PERIPHERAL VASCULAR DISEASE, UNSPECIFIED: Chronic | Status: ACTIVE | Noted: 2024-04-21

## 2024-05-03 PROBLEM — Z86.79 PERSONAL HISTORY OF OTHER DISEASES OF THE CIRCULATORY SYSTEM: Chronic | Status: ACTIVE | Noted: 2024-04-21

## 2024-05-08 ENCOUNTER — APPOINTMENT (OUTPATIENT)
Dept: NEUROLOGY | Facility: CLINIC | Age: 64
End: 2024-05-08

## 2024-05-15 ENCOUNTER — APPOINTMENT (OUTPATIENT)
Dept: HEART AND VASCULAR | Facility: CLINIC | Age: 64
End: 2024-05-15

## 2024-05-16 ENCOUNTER — NON-APPOINTMENT (OUTPATIENT)
Age: 64
End: 2024-05-16

## 2024-05-17 ENCOUNTER — APPOINTMENT (OUTPATIENT)
Dept: HEART AND VASCULAR | Facility: CLINIC | Age: 64
End: 2024-05-17
Payer: MEDICAID

## 2024-05-17 ENCOUNTER — NON-APPOINTMENT (OUTPATIENT)
Age: 64
End: 2024-05-17

## 2024-05-17 PROCEDURE — 93295 DEV INTERROG REMOTE 1/2/MLT: CPT

## 2024-05-17 PROCEDURE — 93296 REM INTERROG EVL PM/IDS: CPT

## 2024-05-20 ENCOUNTER — NON-APPOINTMENT (OUTPATIENT)
Age: 64
End: 2024-05-20

## 2024-06-20 ENCOUNTER — APPOINTMENT (OUTPATIENT)
Dept: HEART AND VASCULAR | Facility: CLINIC | Age: 64
End: 2024-06-20

## 2024-07-08 ENCOUNTER — RX RENEWAL (OUTPATIENT)
Age: 64
End: 2024-07-08

## 2024-07-10 RX ORDER — ATORVASTATIN CALCIUM 40 MG/1
40 TABLET, FILM COATED ORAL DAILY
Qty: 90 | Refills: 3 | Status: ACTIVE | COMMUNITY
Start: 2024-07-10 | End: 1900-01-01

## 2024-07-24 ENCOUNTER — APPOINTMENT (OUTPATIENT)
Dept: ENDOCRINOLOGY | Facility: CLINIC | Age: 64
End: 2024-07-24
Payer: MEDICAID

## 2024-07-24 VITALS
SYSTOLIC BLOOD PRESSURE: 122 MMHG | WEIGHT: 220 LBS | HEIGHT: 74 IN | HEART RATE: 82 BPM | DIASTOLIC BLOOD PRESSURE: 74 MMHG | OXYGEN SATURATION: 95 % | BODY MASS INDEX: 28.23 KG/M2

## 2024-07-24 DIAGNOSIS — N18.2 CHRONIC KIDNEY DISEASE, STAGE 2 (MILD): ICD-10-CM

## 2024-07-24 DIAGNOSIS — I10 ESSENTIAL (PRIMARY) HYPERTENSION: ICD-10-CM

## 2024-07-24 DIAGNOSIS — E78.5 HYPERLIPIDEMIA, UNSPECIFIED: ICD-10-CM

## 2024-07-24 DIAGNOSIS — E11.65 TYPE 2 DIABETES MELLITUS WITH HYPERGLYCEMIA: ICD-10-CM

## 2024-07-24 DIAGNOSIS — E53.8 DEFICIENCY OF OTHER SPECIFIED B GROUP VITAMINS: ICD-10-CM

## 2024-07-24 DIAGNOSIS — E66.3 OVERWEIGHT: ICD-10-CM

## 2024-07-24 DIAGNOSIS — E03.9 HYPOTHYROIDISM, UNSPECIFIED: ICD-10-CM

## 2024-07-24 DIAGNOSIS — E55.9 VITAMIN D DEFICIENCY, UNSPECIFIED: ICD-10-CM

## 2024-07-24 PROCEDURE — 99214 OFFICE O/P EST MOD 30 MIN: CPT

## 2024-07-24 PROCEDURE — 95251 CONT GLUC MNTR ANALYSIS I&R: CPT

## 2024-07-24 NOTE — PHYSICAL EXAM
[Alert] : alert [No Acute Distress] : no acute distress [No Accessory Muscle Use] : no accessory muscle use [Normal Rate and Effort] : normal respiratory rate and effort [Clear to Auscultation] : lungs were clear to auscultation bilaterally [Regular Rhythm] : with a regular rhythm [Normal Rate] : heart rate was normal [Not Tender] : non-tender [Not Distended] : not distended [Soft] : abdomen soft [Oriented x3] : oriented to person, place, and time [Normal Affect] : the affect was normal [Normal Mood] : the mood was normal

## 2024-07-24 NOTE — HISTORY OF PRESENT ILLNESS
[FreeTextEntry1] : Interval history: Under a lot of stress and financial problems recently- sometimes does not eat and when he does a lot of times not the best choices. + depression, on Zoloft.  History of DM: Diagnosed: ~ 40 years ago Severity: uncontrolled Home regimen: Lantus 32 units BID- reports not missing Lantus dosing Novolog 5-15 units TID AC - not taking consistently, forgets often and then injects after  Previous medications: SGLT2- stopped 2/2 severe polyuria No Metformin 2/2 CHF    SMBG: Dexcom Dexcom reviewed Av SD: 68 Very High: 23% High: 36% In Target: 40% Low: <1% Very Low: <1%  Eye doctor: Overdue Neuropathy: yes, does not follow with podiatry Kidney disease: CKD   Smoking: former- quit 2004 Exercise: starting to go to gym- 1-2x per week, cardio some weights, 45-60 minutes   Labs 2024: A1c- not drawn Cr 1.70, GFR 45 , TG 84, HDL 47,  TSH 1.27     All other ROS reviewed, and they are negative. Labs reviewed.

## 2024-08-15 ENCOUNTER — APPOINTMENT (OUTPATIENT)
Dept: OPHTHALMOLOGY | Facility: CLINIC | Age: 64
End: 2024-08-15

## 2024-08-15 ENCOUNTER — NON-APPOINTMENT (OUTPATIENT)
Age: 64
End: 2024-08-15

## 2024-08-16 ENCOUNTER — APPOINTMENT (OUTPATIENT)
Dept: HEART AND VASCULAR | Facility: CLINIC | Age: 64
End: 2024-08-16
Payer: MEDICAID

## 2024-08-16 PROCEDURE — 93295 DEV INTERROG REMOTE 1/2/MLT: CPT

## 2024-08-16 PROCEDURE — 93296 REM INTERROG EVL PM/IDS: CPT

## 2024-08-21 RX ORDER — SEMAGLUTIDE 0.68 MG/ML
2 INJECTION, SOLUTION SUBCUTANEOUS
Qty: 1 | Refills: 3 | Status: ACTIVE | COMMUNITY
Start: 2024-08-21 | End: 1900-01-01

## 2024-08-22 RX ORDER — BLOOD-GLUCOSE SENSOR
EACH MISCELLANEOUS
Qty: 3 | Refills: 5 | Status: ACTIVE | COMMUNITY
Start: 2024-08-22 | End: 1900-01-01

## 2024-10-22 NOTE — PATIENT PROFILE ADULT - NSPROHMSYMPCOND_GEN_A_NUR
Addended by: NATE BONNER on: 10/21/2024 03:43 PM     Modules accepted: Orders    
Addended by: NATE BONNER on: 10/21/2024 03:50 PM     Modules accepted: Orders    
Addended by: NATE BONNER on: 10/22/2024 05:31 PM     Modules accepted: Orders    
cardiovascular/diabetes

## 2024-11-10 NOTE — ASSESSMENT
[FreeTextEntry1] : T2DM- uncontrolled, goal A1C < 7 based on age - Likely due to dietary indiscretion and nature of disease - Discussed long term complications of diabetes at length including MI, CVA, ESRD, Dialysis, Blindness, Amputations, Infections, Erectile dysfunction - Check BG at least 4x per day using CGM - Educated on diet and exercise, goal 5x per week for 30 minutes each day - Discussed the goals of diabetes: A1C, Fasting FS and post meal 1 hour FS - Discussed the need to see Ophthalmology and Podiatry yearly- referrals given - POCT A1C in office 9.9%, patient reports a lot of stress recently- eating schedule is erratic from skipping meals to sometimes eating poor choices. Also frequently forgets to take Novolog prior to meals and takes after - Reinforced diet- needs to have more consistent meals as well as consistent carb/heart healthy meals- needs to make better choices - Needs to set alarms and start getting more organized with eating schedule and remembering to take Novolog BEFORE meals- pt agrees and will start setting alarms - BG today on dexcom all within range which tells me current dosing is okay when patient remembers dosing- patient needs to stay consistent with meals and proper dosing schedule - A1C above goal- will add Ozempic to help improve- 0.25 mg weekly x4 weeks, if tolerating well increase to 0.5 mg weekly-> sample pen given today, pt instructed to call office after 4 weeks to let us know how he's doing/tolerating medication so I then can prescribe to pharmacy- pt also instructed to be his own advocate with addition of Ozempic if he sees BG values decreasing and going low to call office for dosing changes - RTO in 3 months with NP, 6 months with MD   Hypothyroidism s/p GENAO for periodic Hyperthyroid paralysis - TSH wnl, pt clinically euthyroid - Continue LT4 150 mcg daily - Reinforced proper pill technique   HLD- goal LDL < 70 - LDL above target - Patient reports just restarting Statin recently (unclear why it was stopped), if remains high next visit will increase dose   HTN- BP stable this visit - C/w current regimen   Vitamin D Deficiency - No updated levels - Not on a daily supplement   Hx of Vitamin B12 Deficiency - No updated levels, not on a supplement   I have counseled the patient on the benefits, risks and side effects of this GLP-1 agonist. We discussed the benefits from cardiac standpoint and on glucose and weight reduction. We also discussed side effects with the patient including possible nausea, vomiting or other GI side effects. I have also discussed the risk of pancreatitis, including the symptoms to look out for, such as food intolerance, nausea/emesis and abdominal pain. The patient was also explained the link with medullary thyroid cancer and has denied any personal or family history of medullary thyroid cancer. I also counseled patient on the gallbladder inflammation risks as well as the risk for worsening gallbladder stones. As well as the risk of worsening retinopathy and the need to follow up with an eye doctor. Patient also instructed to hold the medication 1-2 weeks prior to having surgery. Patient currently denies any abdominal pain. Patient able to verbalize and teach back understanding of risks vs benefits of this GLP-1.    Glucose Sensor Necessity: This patient with diabetes performs 4 or more glucose checks per day utilizing a home blood glucose monitor. The patient is treated with insulin via 3 or more injections daily (or a subcutaneous insulin infusion pump). This patient requires frequent adjustments to their insulin treatment on the basis of therapeutic continuous glucose monitoring results. In addition, the patient has been to our office for an evaluation of their diabetes control within the past 6 months.
Communicate risk of Fall with Harm to all staff, patient, and family/Provide visual cue: red socks, yellow wristband, yellow gown, etc/Reinforce activity limits and safety measures with patient and family/Bed in lowest position, wheels locked, appropriate side rails in place/Call bell, personal items and telephone in reach/Instruct patient to call for assistance before getting out of bed/chair/stretcher/Non-slip footwear applied when patient is off stretcher/[place] to call system/Physically safe environment - no spills, clutter or unnecessary equipment/Purposeful Proactive Rounding/Room/bathroom lighting operational, light cord in reach

## 2024-11-19 ENCOUNTER — APPOINTMENT (OUTPATIENT)
Dept: ENDOCRINOLOGY | Facility: CLINIC | Age: 64
End: 2024-11-19

## 2024-12-03 ENCOUNTER — NON-APPOINTMENT (OUTPATIENT)
Age: 64
End: 2024-12-03

## 2024-12-04 ENCOUNTER — APPOINTMENT (OUTPATIENT)
Dept: HEART AND VASCULAR | Facility: CLINIC | Age: 64
End: 2024-12-04

## 2024-12-04 PROCEDURE — 93296 REM INTERROG EVL PM/IDS: CPT

## 2024-12-04 PROCEDURE — 93295 DEV INTERROG REMOTE 1/2/MLT: CPT

## 2024-12-06 RX ORDER — BLOOD-GLUCOSE SENSOR
EACH MISCELLANEOUS
Qty: 2 | Refills: 2 | Status: ACTIVE | COMMUNITY
Start: 2024-12-06 | End: 1900-01-01

## 2024-12-06 RX ORDER — BLOOD-GLUCOSE,RECEIVER,CONT
EACH MISCELLANEOUS
Qty: 1 | Refills: 0 | Status: ACTIVE | COMMUNITY
Start: 2024-12-06 | End: 1900-01-01

## 2024-12-19 ENCOUNTER — APPOINTMENT (OUTPATIENT)
Dept: HEART AND VASCULAR | Facility: CLINIC | Age: 64
End: 2024-12-19
Payer: COMMERCIAL

## 2024-12-19 ENCOUNTER — NON-APPOINTMENT (OUTPATIENT)
Age: 64
End: 2024-12-19

## 2024-12-19 VITALS
SYSTOLIC BLOOD PRESSURE: 162 MMHG | HEIGHT: 74 IN | OXYGEN SATURATION: 94 % | HEART RATE: 82 BPM | WEIGHT: 220 LBS | DIASTOLIC BLOOD PRESSURE: 72 MMHG | BODY MASS INDEX: 28.23 KG/M2

## 2024-12-19 DIAGNOSIS — I42.0 DILATED CARDIOMYOPATHY: ICD-10-CM

## 2024-12-19 PROCEDURE — 99215 OFFICE O/P EST HI 40 MIN: CPT

## 2024-12-19 PROCEDURE — G2211 COMPLEX E/M VISIT ADD ON: CPT | Mod: NC

## 2024-12-19 RX ORDER — EMPAGLIFLOZIN 10 MG/1
10 TABLET, FILM COATED ORAL DAILY
Qty: 90 | Refills: 3 | Status: ACTIVE | COMMUNITY
Start: 2024-12-19 | End: 1900-01-01

## 2024-12-22 LAB
ALBUMIN SERPL ELPH-MCNC: 4.3 G/DL
ALP BLD-CCNC: 102 U/L
ALT SERPL-CCNC: 13 U/L
ANION GAP SERPL CALC-SCNC: 13 MMOL/L
AST SERPL-CCNC: 15 U/L
BILIRUB DIRECT SERPL-MCNC: 0.2 MG/DL
BILIRUB INDIRECT SERPL-MCNC: 0.3 MG/DL
BILIRUB SERPL-MCNC: 0.5 MG/DL
BUN SERPL-MCNC: 26 MG/DL
CALCIUM SERPL-MCNC: 8.6 MG/DL
CHLORIDE SERPL-SCNC: 102 MMOL/L
CHOLEST SERPL-MCNC: 135 MG/DL
CO2 SERPL-SCNC: 25 MMOL/L
CREAT SERPL-MCNC: 1.39 MG/DL
EGFR: 57 ML/MIN/1.73M2
GLUCOSE SERPL-MCNC: 237 MG/DL
HCT VFR BLD CALC: 40.9 %
HDLC SERPL-MCNC: 48 MG/DL
HGB BLD-MCNC: 12.5 G/DL
LDLC SERPL CALC-MCNC: 71 MG/DL
MAGNESIUM SERPL-MCNC: 2 MG/DL
MCHC RBC-ENTMCNC: 28.5 PG
MCHC RBC-ENTMCNC: 30.6 G/DL
MCV RBC AUTO: 93.4 FL
NONHDLC SERPL-MCNC: 86 MG/DL
NT-PROBNP SERPL-MCNC: 704 PG/ML
PLATELET # BLD AUTO: 130 K/UL
POTASSIUM SERPL-SCNC: 4.2 MMOL/L
PROT SERPL-MCNC: 7.5 G/DL
RBC # BLD: 4.38 M/UL
RBC # FLD: 15.1 %
SODIUM SERPL-SCNC: 140 MMOL/L
TRIGL SERPL-MCNC: 77 MG/DL
WBC # FLD AUTO: 5.91 K/UL

## 2025-01-13 LAB
ANION GAP SERPL CALC-SCNC: 15 MMOL/L
BUN SERPL-MCNC: 28 MG/DL
CALCIUM SERPL-MCNC: 8.9 MG/DL
CHLORIDE SERPL-SCNC: 103 MMOL/L
CO2 SERPL-SCNC: 25 MMOL/L
CREAT SERPL-MCNC: 1.4 MG/DL
EGFR: 56 ML/MIN/1.73M2
GLUCOSE SERPL-MCNC: 184 MG/DL
NT-PROBNP SERPL-MCNC: 940 PG/ML
POTASSIUM SERPL-SCNC: 4.2 MMOL/L
SODIUM SERPL-SCNC: 143 MMOL/L

## 2025-01-16 ENCOUNTER — APPOINTMENT (OUTPATIENT)
Dept: HEART AND VASCULAR | Facility: CLINIC | Age: 65
End: 2025-01-16
Payer: COMMERCIAL

## 2025-01-16 VITALS
SYSTOLIC BLOOD PRESSURE: 100 MMHG | HEIGHT: 74 IN | HEART RATE: 84 BPM | OXYGEN SATURATION: 97 % | DIASTOLIC BLOOD PRESSURE: 60 MMHG | WEIGHT: 215 LBS | BODY MASS INDEX: 27.59 KG/M2

## 2025-01-16 PROCEDURE — 99215 OFFICE O/P EST HI 40 MIN: CPT

## 2025-01-16 PROCEDURE — G2211 COMPLEX E/M VISIT ADD ON: CPT | Mod: NC

## 2025-01-16 RX ORDER — EPLERENONE 25 MG/1
25 TABLET, COATED ORAL DAILY
Qty: 30 | Refills: 11 | Status: ACTIVE | COMMUNITY
Start: 2025-01-16 | End: 1900-01-01

## 2025-01-28 ENCOUNTER — APPOINTMENT (OUTPATIENT)
Dept: CARDIOLOGY | Facility: CLINIC | Age: 65
End: 2025-01-28

## 2025-01-28 VITALS
OXYGEN SATURATION: 96 % | HEIGHT: 74 IN | HEART RATE: 76 BPM | SYSTOLIC BLOOD PRESSURE: 100 MMHG | DIASTOLIC BLOOD PRESSURE: 58 MMHG | BODY MASS INDEX: 27.72 KG/M2 | WEIGHT: 216 LBS

## 2025-02-06 ENCOUNTER — APPOINTMENT (OUTPATIENT)
Dept: ENDOCRINOLOGY | Facility: CLINIC | Age: 65
End: 2025-02-06
Payer: COMMERCIAL

## 2025-02-06 VITALS
OXYGEN SATURATION: 95 % | HEART RATE: 81 BPM | HEIGHT: 74 IN | WEIGHT: 214 LBS | SYSTOLIC BLOOD PRESSURE: 128 MMHG | DIASTOLIC BLOOD PRESSURE: 72 MMHG | BODY MASS INDEX: 27.46 KG/M2

## 2025-02-06 DIAGNOSIS — I10 ESSENTIAL (PRIMARY) HYPERTENSION: ICD-10-CM

## 2025-02-06 DIAGNOSIS — E78.5 HYPERLIPIDEMIA, UNSPECIFIED: ICD-10-CM

## 2025-02-06 DIAGNOSIS — E11.65 TYPE 2 DIABETES MELLITUS WITH HYPERGLYCEMIA: ICD-10-CM

## 2025-02-06 DIAGNOSIS — E03.9 HYPOTHYROIDISM, UNSPECIFIED: ICD-10-CM

## 2025-02-06 PROCEDURE — 99215 OFFICE O/P EST HI 40 MIN: CPT

## 2025-02-06 PROCEDURE — 95251 CONT GLUC MNTR ANALYSIS I&R: CPT

## 2025-02-06 RX ORDER — GABAPENTIN 100 MG
100 TABLET ORAL 3 TIMES DAILY
Refills: 0 | Status: ACTIVE | COMMUNITY

## 2025-02-06 RX ORDER — SEMAGLUTIDE 1.34 MG/ML
4 INJECTION, SOLUTION SUBCUTANEOUS
Qty: 3 | Refills: 1 | Status: ACTIVE | COMMUNITY
Start: 2025-02-06 | End: 1900-01-01

## 2025-02-06 RX ORDER — PANTOPRAZOLE SODIUM 40 MG/1
40 TABLET, DELAYED RELEASE ORAL DAILY
Refills: 0 | Status: ACTIVE | COMMUNITY

## 2025-02-10 ENCOUNTER — RX RENEWAL (OUTPATIENT)
Age: 65
End: 2025-02-10

## 2025-03-05 ENCOUNTER — NON-APPOINTMENT (OUTPATIENT)
Age: 65
End: 2025-03-05

## 2025-03-05 ENCOUNTER — APPOINTMENT (OUTPATIENT)
Dept: HEART AND VASCULAR | Facility: CLINIC | Age: 65
End: 2025-03-05
Payer: COMMERCIAL

## 2025-03-05 PROCEDURE — 93295 DEV INTERROG REMOTE 1/2/MLT: CPT

## 2025-03-05 PROCEDURE — 93296 REM INTERROG EVL PM/IDS: CPT

## 2025-03-19 ENCOUNTER — OFFICE (OUTPATIENT)
Dept: URBAN - METROPOLITAN AREA CLINIC 1 | Facility: CLINIC | Age: 65
Setting detail: OPHTHALMOLOGY
End: 2025-03-19
Payer: COMMERCIAL

## 2025-03-19 DIAGNOSIS — H52.4: ICD-10-CM

## 2025-03-19 DIAGNOSIS — E11.3391: ICD-10-CM

## 2025-03-19 PROBLEM — E11.3292 DM TYPE 2; RIGHT MOD WITHOUT ME, LEFT MILD WITHOUT ME: Status: ACTIVE | Noted: 2025-03-19

## 2025-03-19 PROBLEM — H52.7 REFRACTIVE ERROR: Status: ACTIVE | Noted: 2025-03-19

## 2025-03-19 PROCEDURE — 92004 COMPRE OPH EXAM NEW PT 1/>: CPT

## 2025-03-19 PROCEDURE — 92250 FUNDUS PHOTOGRAPHY W/I&R: CPT

## 2025-03-19 PROCEDURE — 92015 DETERMINE REFRACTIVE STATE: CPT

## 2025-03-19 ASSESSMENT — KERATOMETRY
OS_K1POWER_DIOPTERS: 43.75
OS_AXISANGLE_DEGREES: 075
OD_AXISANGLE_DEGREES: 105
OD_K1POWER_DIOPTERS: 43.50
OD_K2POWER_DIOPTERS: 44.50
OS_K2POWER_DIOPTERS: 44.00

## 2025-03-19 ASSESSMENT — REFRACTION_AUTOREFRACTION
OS_SPHERE: +0.75
OS_AXIS: 100
OD_AXIS: 075
OS_CYLINDER: -0.75
OD_CYLINDER: -0.50
OD_SPHERE: +0.75

## 2025-03-19 ASSESSMENT — TONOMETRY
OD_IOP_MMHG: 15
OS_IOP_MMHG: 14

## 2025-03-19 ASSESSMENT — REFRACTION_MANIFEST
OS_CYLINDER: -0.50
OS_AXIS: 100
OD_SPHERE: +0.25
OD_VA1: 20/25
OS_ADD: +2.50
OD_CYLINDER: -0.50
OD_ADD: +2.50
OD_AXIS: 75
OS_SPHERE: +0.50
OS_VA1: 20/25+2

## 2025-03-19 ASSESSMENT — VISUAL ACUITY
OD_BCVA: 20/30-2
OS_BCVA: 20/30

## 2025-03-19 ASSESSMENT — REFRACTION_CURRENTRX
OS_ADD: +1.75
OD_OVR_VA: 20/
OS_VPRISM_DIRECTION: SV
OS_OVR_VA: 20/
OD_ADD: +1.75
OD_VPRISM_DIRECTION: SV

## 2025-03-19 ASSESSMENT — CONFRONTATIONAL VISUAL FIELD TEST (CVF)
OS_FINDINGS: FULL
OD_FINDINGS: FULL

## 2025-03-21 RX ORDER — INSULIN GLARGINE-YFGN 100 [IU]/ML
100 INJECTION, SOLUTION SUBCUTANEOUS
Qty: 4 | Refills: 0 | Status: ACTIVE | COMMUNITY
Start: 2025-03-21 | End: 1900-01-01

## 2025-03-24 ENCOUNTER — APPOINTMENT (OUTPATIENT)
Dept: CARDIOLOGY | Facility: CLINIC | Age: 65
End: 2025-03-24

## 2025-04-08 ENCOUNTER — APPOINTMENT (OUTPATIENT)
Dept: CARDIOLOGY | Facility: CLINIC | Age: 65
End: 2025-04-08

## 2025-05-05 ENCOUNTER — APPOINTMENT (OUTPATIENT)
Dept: ENDOCRINOLOGY | Facility: CLINIC | Age: 65
End: 2025-05-05

## 2025-06-02 ENCOUNTER — APPOINTMENT (OUTPATIENT)
Dept: ENDOCRINOLOGY | Facility: CLINIC | Age: 65
End: 2025-06-02
Payer: COMMERCIAL

## 2025-06-02 ENCOUNTER — NON-APPOINTMENT (OUTPATIENT)
Age: 65
End: 2025-06-02

## 2025-06-02 VITALS — HEART RATE: 82 BPM | OXYGEN SATURATION: 94 % | BODY MASS INDEX: 26.05 KG/M2 | HEIGHT: 74 IN | WEIGHT: 203 LBS

## 2025-06-02 VITALS — DIASTOLIC BLOOD PRESSURE: 70 MMHG | SYSTOLIC BLOOD PRESSURE: 114 MMHG

## 2025-06-02 DIAGNOSIS — E03.9 HYPOTHYROIDISM, UNSPECIFIED: ICD-10-CM

## 2025-06-02 DIAGNOSIS — E05.00 THYROTOXICOSIS WITH DIFFUSE GOITER W/OUT THYROTOXIC CRISIS OR STORM: ICD-10-CM

## 2025-06-02 DIAGNOSIS — E11.65 TYPE 2 DIABETES MELLITUS WITH HYPERGLYCEMIA: ICD-10-CM

## 2025-06-02 DIAGNOSIS — E78.5 HYPERLIPIDEMIA, UNSPECIFIED: ICD-10-CM

## 2025-06-02 DIAGNOSIS — I10 ESSENTIAL (PRIMARY) HYPERTENSION: ICD-10-CM

## 2025-06-02 DIAGNOSIS — E53.8 DEFICIENCY OF OTHER SPECIFIED B GROUP VITAMINS: ICD-10-CM

## 2025-06-02 DIAGNOSIS — E66.3 OVERWEIGHT: ICD-10-CM

## 2025-06-02 DIAGNOSIS — E55.9 VITAMIN D DEFICIENCY, UNSPECIFIED: ICD-10-CM

## 2025-06-02 PROCEDURE — G2211 COMPLEX E/M VISIT ADD ON: CPT | Mod: NC

## 2025-06-02 PROCEDURE — 95251 CONT GLUC MNTR ANALYSIS I&R: CPT

## 2025-06-02 PROCEDURE — 99214 OFFICE O/P EST MOD 30 MIN: CPT

## 2025-06-03 DIAGNOSIS — N18.2 CHRONIC KIDNEY DISEASE, STAGE 2 (MILD): ICD-10-CM

## 2025-06-03 LAB
25(OH)D3 SERPL-MCNC: 16.8 NG/ML
ALBUMIN SERPL ELPH-MCNC: 4.4 G/DL
ALP BLD-CCNC: 89 U/L
ALT SERPL-CCNC: 14 U/L
ANION GAP SERPL CALC-SCNC: 16 MMOL/L
AST SERPL-CCNC: 15 U/L
BASOPHILS # BLD AUTO: 0.04 K/UL
BASOPHILS NFR BLD AUTO: 0.6 %
BILIRUB SERPL-MCNC: 0.5 MG/DL
BUN SERPL-MCNC: 30 MG/DL
CALCIUM SERPL-MCNC: 9.1 MG/DL
CHLORIDE SERPL-SCNC: 102 MMOL/L
CHOLEST SERPL-MCNC: 129 MG/DL
CO2 SERPL-SCNC: 20 MMOL/L
CREAT SERPL-MCNC: 1.63 MG/DL
CREAT SPEC-SCNC: 98 MG/DL
EGFRCR SERPLBLD CKD-EPI 2021: 47 ML/MIN/1.73M2
EOSINOPHIL # BLD AUTO: 0.15 K/UL
EOSINOPHIL NFR BLD AUTO: 2.3 %
ESTIMATED AVERAGE GLUCOSE: 192 MG/DL
GLUCOSE SERPL-MCNC: 137 MG/DL
HBA1C MFR BLD HPLC: 8.3 %
HCT VFR BLD CALC: 46.8 %
HDLC SERPL-MCNC: 49 MG/DL
HGB BLD-MCNC: 14.3 G/DL
IMM GRANULOCYTES NFR BLD AUTO: 1.2 %
LDLC SERPL-MCNC: 66 MG/DL
LYMPHOCYTES # BLD AUTO: 0.74 K/UL
LYMPHOCYTES NFR BLD AUTO: 11.2 %
MAN DIFF?: NORMAL
MCHC RBC-ENTMCNC: 27 PG
MCHC RBC-ENTMCNC: 30.6 G/DL
MCV RBC AUTO: 88.5 FL
MICROALBUMIN 24H UR DL<=1MG/L-MCNC: 2.2 MG/DL
MICROALBUMIN/CREAT 24H UR-RTO: 23 MG/G
MONOCYTES # BLD AUTO: 0.71 K/UL
MONOCYTES NFR BLD AUTO: 10.8 %
NEUTROPHILS # BLD AUTO: 4.86 K/UL
NEUTROPHILS NFR BLD AUTO: 73.9 %
NONHDLC SERPL-MCNC: 79 MG/DL
PLATELET # BLD AUTO: 132 K/UL
POTASSIUM SERPL-SCNC: 5 MMOL/L
PROT SERPL-MCNC: 7.6 G/DL
RBC # BLD: 5.29 M/UL
RBC # FLD: 16.2 %
SODIUM SERPL-SCNC: 138 MMOL/L
T4 FREE SERPL-MCNC: 1.2 NG/DL
TRIGL SERPL-MCNC: 61 MG/DL
TSH SERPL-ACNC: 2.58 UIU/ML
VIT B12 SERPL-MCNC: 478 PG/ML
WBC # FLD AUTO: 6.58 K/UL

## 2025-06-04 ENCOUNTER — APPOINTMENT (OUTPATIENT)
Dept: HEART AND VASCULAR | Facility: CLINIC | Age: 65
End: 2025-06-04
Payer: COMMERCIAL

## 2025-06-04 ENCOUNTER — NON-APPOINTMENT (OUTPATIENT)
Age: 65
End: 2025-06-04

## 2025-06-04 PROCEDURE — 93296 REM INTERROG EVL PM/IDS: CPT

## 2025-06-04 PROCEDURE — 93295 DEV INTERROG REMOTE 1/2/MLT: CPT

## 2025-06-11 ENCOUNTER — OFFICE (OUTPATIENT)
Dept: URBAN - METROPOLITAN AREA CLINIC 103 | Facility: CLINIC | Age: 65
Setting detail: OPHTHALMOLOGY
End: 2025-06-11
Payer: COMMERCIAL

## 2025-06-11 DIAGNOSIS — E11.3211: ICD-10-CM

## 2025-06-11 DIAGNOSIS — E11.3292: ICD-10-CM

## 2025-06-11 PROCEDURE — 67210 TREATMENT OF RETINAL LESION: CPT | Mod: RT | Performed by: OPHTHALMOLOGY

## 2025-06-11 PROCEDURE — 92134 CPTRZ OPH DX IMG PST SGM RTA: CPT | Performed by: OPHTHALMOLOGY

## 2025-06-11 ASSESSMENT — VISUAL ACUITY
OS_BCVA: 20/40
OD_BCVA: 20/25-2

## 2025-06-11 ASSESSMENT — REFRACTION_AUTOREFRACTION
OD_SPHERE: +0.75
OS_AXIS: 100
OS_SPHERE: +0.75
OD_AXIS: 075
OD_CYLINDER: -0.50
OS_CYLINDER: -0.75

## 2025-06-11 ASSESSMENT — KERATOMETRY
OD_AXISANGLE_DEGREES: 105
OD_K1POWER_DIOPTERS: 43.50
OS_K2POWER_DIOPTERS: 44.00
OD_K2POWER_DIOPTERS: 44.50
OS_K1POWER_DIOPTERS: 43.75
OS_AXISANGLE_DEGREES: 075

## 2025-06-11 ASSESSMENT — TONOMETRY
OD_IOP_MMHG: 12
OS_IOP_MMHG: 15

## 2025-06-16 ENCOUNTER — RX RENEWAL (OUTPATIENT)
Age: 65
End: 2025-06-16

## 2025-06-17 ENCOUNTER — RX RENEWAL (OUTPATIENT)
Age: 65
End: 2025-06-17

## 2025-06-27 ENCOUNTER — APPOINTMENT (OUTPATIENT)
Dept: CARDIOLOGY | Facility: CLINIC | Age: 65
End: 2025-06-27
Payer: COMMERCIAL

## 2025-06-27 PROCEDURE — 93306 TTE W/DOPPLER COMPLETE: CPT

## 2025-07-07 ENCOUNTER — RX RENEWAL (OUTPATIENT)
Age: 65
End: 2025-07-07

## 2025-07-08 ENCOUNTER — APPOINTMENT (OUTPATIENT)
Dept: CARDIOLOGY | Facility: CLINIC | Age: 65
End: 2025-07-08

## 2025-07-08 VITALS
OXYGEN SATURATION: 91 % | HEART RATE: 83 BPM | DIASTOLIC BLOOD PRESSURE: 48 MMHG | SYSTOLIC BLOOD PRESSURE: 88 MMHG | WEIGHT: 202 LBS | BODY MASS INDEX: 25.93 KG/M2 | HEIGHT: 74 IN

## 2025-07-18 RX ORDER — INSULIN LISPRO 100 [IU]/ML
100 INJECTION, SOLUTION INTRAVENOUS; SUBCUTANEOUS
Qty: 2 | Refills: 1 | Status: ACTIVE | COMMUNITY
Start: 2025-07-18 | End: 1900-01-01

## 2025-09-05 ENCOUNTER — NON-APPOINTMENT (OUTPATIENT)
Age: 65
End: 2025-09-05

## 2025-09-05 ENCOUNTER — APPOINTMENT (OUTPATIENT)
Dept: HEART AND VASCULAR | Facility: CLINIC | Age: 65
End: 2025-09-05
Payer: COMMERCIAL

## 2025-09-05 PROCEDURE — 93295 DEV INTERROG REMOTE 1/2/MLT: CPT

## 2025-09-05 PROCEDURE — 93296 REM INTERROG EVL PM/IDS: CPT

## 2025-09-08 ENCOUNTER — APPOINTMENT (OUTPATIENT)
Dept: ENDOCRINOLOGY | Facility: CLINIC | Age: 65
End: 2025-09-08

## 2025-09-10 ENCOUNTER — RX RENEWAL (OUTPATIENT)
Age: 65
End: 2025-09-10